# Patient Record
Sex: FEMALE | Race: WHITE | Employment: FULL TIME | URBAN - METROPOLITAN AREA
[De-identification: names, ages, dates, MRNs, and addresses within clinical notes are randomized per-mention and may not be internally consistent; named-entity substitution may affect disease eponyms.]

---

## 2017-06-15 ENCOUNTER — TELEPHONE (OUTPATIENT)
Dept: INTERNAL MEDICINE CLINIC | Age: 47
End: 2017-06-15

## 2017-06-15 NOTE — TELEPHONE ENCOUNTER
Patient request a return call regarding results from her OB GYN. Patient has been scheduled to be seen by  Urology for blood in her urin  and they will can't  see her until next week and the patient is in pain and would like assistance from our office to get in ASAP.  Patient best contact [phone] (M)

## 2017-06-16 NOTE — TELEPHONE ENCOUNTER
R/C to Pt, she states she will wait until appt with urology on Tuesday, 6/20/17. Pt does not need assistance at this time.

## 2018-03-12 ENCOUNTER — HOSPITAL ENCOUNTER (OUTPATIENT)
Dept: MAMMOGRAPHY | Age: 48
Discharge: HOME | End: 2018-03-12
Attending: OBSTETRICS & GYNECOLOGY
Payer: COMMERCIAL

## 2018-03-12 DIAGNOSIS — Z12.31 VISIT FOR SCREENING MAMMOGRAM: ICD-10-CM

## 2018-03-12 PROCEDURE — 77063 BREAST TOMOSYNTHESIS BI: CPT

## 2018-04-09 ENCOUNTER — OFFICE VISIT (OUTPATIENT)
Dept: INTERNAL MEDICINE CLINIC | Age: 48
End: 2018-04-09

## 2018-04-09 VITALS
HEIGHT: 67 IN | SYSTOLIC BLOOD PRESSURE: 124 MMHG | RESPIRATION RATE: 14 BRPM | TEMPERATURE: 97.6 F | WEIGHT: 172.6 LBS | HEART RATE: 89 BPM | OXYGEN SATURATION: 98 % | DIASTOLIC BLOOD PRESSURE: 65 MMHG | BODY MASS INDEX: 27.09 KG/M2

## 2018-04-09 DIAGNOSIS — Z00.00 ROUTINE GENERAL MEDICAL EXAMINATION AT A HEALTH CARE FACILITY: Primary | ICD-10-CM

## 2018-04-09 DIAGNOSIS — F32.0 CURRENT MILD EPISODE OF MAJOR DEPRESSIVE DISORDER WITHOUT PRIOR EPISODE (HCC): ICD-10-CM

## 2018-04-09 RX ORDER — LISDEXAMFETAMINE DIMESYLATE 30 MG/1
CAPSULE ORAL
COMMUNITY
Start: 2018-03-12 | End: 2022-07-12 | Stop reason: SDUPTHER

## 2018-04-09 RX ORDER — BUPROPION HYDROCHLORIDE 150 MG/1
150 TABLET ORAL
COMMUNITY
Start: 2018-03-08

## 2018-04-09 NOTE — PROGRESS NOTES
HISTORY OF PRESENT ILLNESS  Henna Moran is a 53 y.o. female. HPI   Dysthymia- on wellbutrin and vyvanse and doing ok- she went off of it in October and tried to get off of everything and went back in Feb and realized she needed to be back on it ; def helping with mood and focus ; running two times a week ; it has been sporadic - in Jan she did ketogenic diet - did four weeks to the book- no change at all     Review of Systems   Constitutional: Negative for chills, diaphoresis, fever, malaise/fatigue and weight loss. HENT: Negative for congestion, ear pain, hearing loss, sore throat and tinnitus. Eyes: Negative for blurred vision and double vision. Respiratory: Negative for cough, hemoptysis, sputum production, shortness of breath and wheezing. Cardiovascular: Negative for chest pain, palpitations, orthopnea, claudication, leg swelling and PND. Gastrointestinal: Negative for abdominal pain, blood in stool, constipation, diarrhea, heartburn, nausea and vomiting. Genitourinary: Negative for dysuria, flank pain, frequency, hematuria and urgency. Musculoskeletal: Negative for back pain, joint pain, myalgias and neck pain. Skin: Negative. Neurological: Negative for dizziness, tingling, sensory change, speech change, focal weakness, seizures, loss of consciousness, weakness and headaches. Endo/Heme/Allergies: Negative for environmental allergies and polydipsia. Does not bruise/bleed easily. Psychiatric/Behavioral: Negative for depression, memory loss, substance abuse and suicidal ideas. The patient is not nervous/anxious and does not have insomnia. Physical Exam   Constitutional: She is oriented to person, place, and time. She appears well-developed and well-nourished. HENT:   Head: Normocephalic and atraumatic.    Right Ear: External ear normal.   Left Ear: External ear normal.   Nose: Nose normal.   Mouth/Throat: Oropharynx is clear and moist.   Eyes: Conjunctivae and EOM are normal. Pupils are equal, round, and reactive to light. Neck: Normal range of motion. Neck supple. Carotid bruit is not present. No thyromegaly present. Cardiovascular: Normal rate, regular rhythm, S1 normal, S2 normal, normal heart sounds and intact distal pulses. Pulmonary/Chest: Effort normal and breath sounds normal.   Abdominal: Soft. Normal appearance and bowel sounds are normal. There is no hepatosplenomegaly. There is no tenderness. Musculoskeletal: Normal range of motion. Neurological: She is alert and oriented to person, place, and time. Skin: Skin is warm, dry and intact. Psychiatric: She has a normal mood and affect. Her behavior is normal. Judgment and thought content normal.   Nursing note and vitals reviewed. ASSESSMENT and PLAN  Diagnoses and all orders for this visit:    1. Routine general medical examination at a health care facility  -     CBC W/O DIFF  -     METABOLIC PANEL, COMPREHENSIVE  -     LIPID PANEL  -     TSH 3RD GENERATION    2.  Current mild episode of major depressive disorder without prior episode ([facility] [address]- cont with current medication with Elisabeth Crowley       lab results and schedule of future lab studies reviewed with patient  reviewed diet, exercise and weight control  cardiovascular risk and specific lipid/LDL goals reviewed  reviewed medications and side effects in detail

## 2018-04-10 LAB
ALBUMIN SERPL-MCNC: 4.8 G/DL (ref 3.5–5.5)
ALBUMIN/GLOB SERPL: 2 {RATIO} (ref 1.2–2.2)
ALP SERPL-CCNC: 77 IU/L (ref 39–117)
ALT SERPL-CCNC: 14 IU/L (ref 0–32)
AST SERPL-CCNC: 22 IU/L (ref 0–40)
BILIRUB SERPL-MCNC: 0.6 MG/DL (ref 0–1.2)
BUN SERPL-MCNC: 9 MG/DL (ref 6–24)
BUN/CREAT SERPL: 13 (ref 9–23)
CALCIUM SERPL-MCNC: 9.5 MG/DL (ref 8.7–10.2)
CHLORIDE SERPL-SCNC: 97 MMOL/L (ref 96–106)
CHOLEST SERPL-MCNC: 175 MG/DL (ref 100–199)
CO2 SERPL-SCNC: 25 MMOL/L (ref 18–29)
CREAT SERPL-MCNC: 0.71 MG/DL (ref 0.57–1)
ERYTHROCYTE [DISTWIDTH] IN BLOOD BY AUTOMATED COUNT: 13.2 % (ref 12.3–15.4)
GFR SERPLBLD CREATININE-BSD FMLA CKD-EPI: 102 ML/MIN/1.73
GFR SERPLBLD CREATININE-BSD FMLA CKD-EPI: 117 ML/MIN/1.73
GLOBULIN SER CALC-MCNC: 2.4 G/DL (ref 1.5–4.5)
GLUCOSE SERPL-MCNC: 96 MG/DL (ref 65–99)
HCT VFR BLD AUTO: 42.2 % (ref 34–46.6)
HDLC SERPL-MCNC: 79 MG/DL
HGB BLD-MCNC: 14.5 G/DL (ref 11.1–15.9)
INTERPRETATION, 910389: NORMAL
LDLC SERPL CALC-MCNC: 85 MG/DL (ref 0–99)
MCH RBC QN AUTO: 32.2 PG (ref 26.6–33)
MCHC RBC AUTO-ENTMCNC: 34.4 G/DL (ref 31.5–35.7)
MCV RBC AUTO: 94 FL (ref 79–97)
PLATELET # BLD AUTO: 309 X10E3/UL (ref 150–379)
POTASSIUM SERPL-SCNC: 4.2 MMOL/L (ref 3.5–5.2)
PROT SERPL-MCNC: 7.2 G/DL (ref 6–8.5)
RBC # BLD AUTO: 4.5 X10E6/UL (ref 3.77–5.28)
SODIUM SERPL-SCNC: 139 MMOL/L (ref 134–144)
TRIGL SERPL-MCNC: 55 MG/DL (ref 0–149)
TSH SERPL DL<=0.005 MIU/L-ACNC: 2.71 UIU/ML (ref 0.45–4.5)
VLDLC SERPL CALC-MCNC: 11 MG/DL (ref 5–40)
WBC # BLD AUTO: 8.2 X10E3/UL (ref 3.4–10.8)

## 2018-07-11 ENCOUNTER — TELEPHONE (OUTPATIENT)
Dept: INTERNAL MEDICINE CLINIC | Age: 48
End: 2018-07-11

## 2019-08-08 ENCOUNTER — HOSPITAL ENCOUNTER (OUTPATIENT)
Dept: MAMMOGRAPHY | Age: 49
Discharge: HOME | End: 2019-08-08
Attending: OBSTETRICS & GYNECOLOGY
Payer: COMMERCIAL

## 2019-08-08 DIAGNOSIS — Z12.31 VISIT FOR SCREENING MAMMOGRAM: ICD-10-CM

## 2019-08-08 PROCEDURE — 77063 BREAST TOMOSYNTHESIS BI: CPT

## 2019-09-25 ENCOUNTER — OFFICE VISIT (OUTPATIENT)
Dept: INTERNAL MEDICINE CLINIC | Age: 49
End: 2019-09-25

## 2019-09-25 VITALS
RESPIRATION RATE: 16 BRPM | TEMPERATURE: 98.5 F | WEIGHT: 151.4 LBS | OXYGEN SATURATION: 97 % | SYSTOLIC BLOOD PRESSURE: 126 MMHG | HEIGHT: 67 IN | DIASTOLIC BLOOD PRESSURE: 77 MMHG | HEART RATE: 85 BPM | BODY MASS INDEX: 23.76 KG/M2

## 2019-09-25 DIAGNOSIS — F32.0 CURRENT MILD EPISODE OF MAJOR DEPRESSIVE DISORDER WITHOUT PRIOR EPISODE (HCC): ICD-10-CM

## 2019-09-25 DIAGNOSIS — R10.9 ABDOMINAL CRAMPING: ICD-10-CM

## 2019-09-25 DIAGNOSIS — M54.50 ACUTE RIGHT-SIDED LOW BACK PAIN WITHOUT SCIATICA: ICD-10-CM

## 2019-09-25 DIAGNOSIS — R10.2 PELVIC PAIN: Primary | ICD-10-CM

## 2019-09-25 DIAGNOSIS — R53.83 OTHER FATIGUE: ICD-10-CM

## 2019-09-25 LAB
BILIRUB UR QL STRIP: NEGATIVE
GLUCOSE UR-MCNC: NEGATIVE MG/DL
KETONES P FAST UR STRIP-MCNC: NEGATIVE MG/DL
PH UR STRIP: 7 [PH] (ref 4.6–8)
PROT UR QL STRIP: NEGATIVE
SP GR UR STRIP: 1.01 (ref 1–1.03)
UA UROBILINOGEN AMB POC: NORMAL (ref 0.2–1)
URINALYSIS CLARITY POC: NORMAL
URINALYSIS COLOR POC: NORMAL
URINE BLOOD POC: NORMAL
URINE LEUKOCYTES POC: NEGATIVE
URINE NITRITES POC: NEGATIVE

## 2019-09-25 NOTE — PROGRESS NOTES
HISTORY OF PRESENT ILLNESS  Ciarra Abel is a 51 y.o. female. HPI  Flank pain/Fatigue: Pt reports that one of her lymph nodes started to swell in her R groin. She has been experiencing a [place] fog\" for the last few weeks, dizziness, fatigue, minor memory loss, nausea, mild lower abdominal pain, intermittent mild flank pain, intermittent diarrhea, and mild dysuria. Denies strong loss of appetite, bloating/fullness, vaginal discharge, dysuria, polyuria, or severe flank pain. Allergies: Pt reports increased drainage at night. She works at home. Denies consistent cough. Depression: Stable and well-managed with Wellbutrin. Pt endorses stable exercise. Denies SOB or CP. Review of Systems   Constitutional: Positive for malaise/fatigue. Gastrointestinal: Positive for diarrhea and nausea. Negative for blood in stool and constipation. Genitourinary: Positive for dysuria and flank pain. Negative for urgency. Neurological: Positive for dizziness. Psychiatric/Behavioral: Positive for memory loss. All other systems reviewed and are negative. Physical Exam   Constitutional: She is oriented to person, place, and time. She appears well-developed and well-nourished. HENT:   Head: Normocephalic and atraumatic. Right Ear: External ear normal.   Left Ear: External ear normal.   Nose: Nose normal.   Mouth/Throat: Oropharynx is clear and moist.   Eyes: Pupils are equal, round, and reactive to light. Conjunctivae and EOM are normal.   Neck: Normal range of motion. Neck supple. Cardiovascular: Normal rate, regular rhythm, normal heart sounds and intact distal pulses. Pulmonary/Chest: Effort normal and breath sounds normal. Right breast exhibits no inverted nipple, no mass, no nipple discharge, no skin change and no tenderness. Left breast exhibits no inverted nipple, no mass, no nipple discharge, no skin change and no tenderness. No breast swelling, tenderness, discharge or bleeding.  Breasts are symmetrical.   Abdominal: Soft. Bowel sounds are normal.   Genitourinary: Rectum normal and vagina normal. Rectal exam shows anal tone normal and guaiac negative stool. No breast swelling, tenderness, discharge or bleeding. Musculoskeletal: Normal range of motion. Lymphadenopathy:   1 slightly swollen lymph node in R groin   Neurological: She is alert and oriented to person, place, and time. Skin: Skin is warm and dry. Psychiatric: She has a normal mood and affect. Her behavior is normal. Judgment and thought content normal.   Nursing note and vitals reviewed. ASSESSMENT and PLAN  Diagnoses and all orders for this visit:    1. Pelvic pain  Ordered abdominal US-she will get pelvic with GYN. Informed pt that her urine was not immediately indicative of UTI. Informed pt that her urine showed increased blood, which is indicative of kidney stone. Discussed with pt that hormonal shift can cause fatigue, nausea, and trouble focusing. Informed pt that since her sx started when she started taking her generic Wellbutrin (1 mo ago), her sx maybe side effects. Advised pt to f/u with her GYN.   -     AMB POC URINALYSIS DIP STICK AUTO W/O MICRO  -     CULTURE, URINE  -     US ABD COMP; Future    2. Other fatigue  Ordered labs to r/o underlying issues or concerns. Informed pt that her urine was not immediately indicative of UTI. Informed pt that her urine showed increased blood, which is indicative of kidney stone. Discussed with pt that hormonal shift can cause fatigue, nausea, and trouble focusing. Informed pt that since her sx started when she started taking her generic Wellbutrin (1 mo ago), her sx maybe side effects. Advised pt to f/u with her GYN.   -     CBC W/O DIFF  -     METABOLIC PANEL, COMPREHENSIVE  -     LIPID PANEL  -     TSH 3RD GENERATION  -     T4, FREE    3. Current mild episode of major depressive disorder without prior episode ([facility] [address]  Stable and well-managed with Wellbutrin XL.  No change in medications. 4. Acute right-sided low back pain without sciatica  Ordered pelvic US. Informed pt that her urine was not immediately indicative of UTI. Informed pt that her urine showed increased blood, which is indicative of kidney stone. Discussed with pt that hormonal shift can cause fatigue, nausea, and trouble focusing. Informed pt that since her sx started when she started taking her generic Wellbutrin (1 mo ago), her sx maybe side effects. Advised pt to f/u with her GYN.   -     US ABD COMP; Future    5. Abdominal cramping  Ordered labs to r/o underlying issues or conditions. Informed pt that her urine was not immediately indicative of UTI. Informed pt that her urine showed increased blood, which is indicative of kidney stone. Discussed with pt that hormonal shift can cause fatigue, nausea, and trouble focusing. Informed pt that since her sx started when she started taking her generic Wellbutrin (1 mo ago), her sx maybe side effects. Advised pt to f/u with her GYN.   -     AMYLASE  -     LIPASE      Lab results and schedule of future lab studies reviewed with patient. Reviewed diet, exercise and weight control. Written by Jennifer Canela, as dictated by Clem Alfonso MD.     Current diagnosis and concerns discussed with pt at length. Understands risks and benefits or current treatment plan and medications and accepts the treatment and medication with any possible risks. Pt asks appropriate questions which were answered. Pt instructed to call with any concerns or problems. This note will not be viewable in [address]

## 2019-09-26 LAB
ALBUMIN SERPL-MCNC: 5.1 G/DL (ref 3.5–5.5)
ALBUMIN/GLOB SERPL: 2.6 {RATIO} (ref 1.2–2.2)
ALP SERPL-CCNC: 60 IU/L (ref 39–117)
ALT SERPL-CCNC: 13 IU/L (ref 0–32)
AMYLASE SERPL-CCNC: 59 U/L (ref 31–124)
AST SERPL-CCNC: 18 IU/L (ref 0–40)
BILIRUB SERPL-MCNC: 0.7 MG/DL (ref 0–1.2)
BUN SERPL-MCNC: 13 MG/DL (ref 6–24)
BUN/CREAT SERPL: 16 (ref 9–23)
CALCIUM SERPL-MCNC: 10 MG/DL (ref 8.7–10.2)
CHLORIDE SERPL-SCNC: 99 MMOL/L (ref 96–106)
CHOLEST SERPL-MCNC: 186 MG/DL (ref 100–199)
CO2 SERPL-SCNC: 24 MMOL/L (ref 20–29)
CREAT SERPL-MCNC: 0.79 MG/DL (ref 0.57–1)
ERYTHROCYTE [DISTWIDTH] IN BLOOD BY AUTOMATED COUNT: 12.7 % (ref 12.3–15.4)
GLOBULIN SER CALC-MCNC: 2 G/DL (ref 1.5–4.5)
GLUCOSE SERPL-MCNC: 104 MG/DL (ref 65–99)
HCT VFR BLD AUTO: 43.6 % (ref 34–46.6)
HDLC SERPL-MCNC: 86 MG/DL
HGB BLD-MCNC: 14.8 G/DL (ref 11.1–15.9)
INTERPRETATION, 910389: NORMAL
LDLC SERPL CALC-MCNC: 93 MG/DL (ref 0–99)
LIPASE SERPL-CCNC: 31 U/L (ref 14–72)
MCH RBC QN AUTO: 32.4 PG (ref 26.6–33)
MCHC RBC AUTO-ENTMCNC: 33.9 G/DL (ref 31.5–35.7)
MCV RBC AUTO: 95 FL (ref 79–97)
PLATELET # BLD AUTO: 342 X10E3/UL (ref 150–450)
POTASSIUM SERPL-SCNC: 4.2 MMOL/L (ref 3.5–5.2)
PROT SERPL-MCNC: 7.1 G/DL (ref 6–8.5)
RBC # BLD AUTO: 4.57 X10E6/UL (ref 3.77–5.28)
SODIUM SERPL-SCNC: 136 MMOL/L (ref 134–144)
T4 FREE SERPL-MCNC: 1.15 NG/DL (ref 0.82–1.77)
TRIGL SERPL-MCNC: 33 MG/DL (ref 0–149)
TSH SERPL DL<=0.005 MIU/L-ACNC: 1.83 UIU/ML (ref 0.45–4.5)
VLDLC SERPL CALC-MCNC: 7 MG/DL (ref 5–40)
WBC # BLD AUTO: 7.5 X10E3/UL (ref 3.4–10.8)

## 2019-09-27 LAB — BACTERIA UR CULT: NORMAL

## 2020-01-15 ENCOUNTER — TELEPHONE (OUTPATIENT)
Dept: INTERNAL MEDICINE CLINIC | Age: 50
End: 2020-01-15

## 2020-01-15 NOTE — TELEPHONE ENCOUNTER
Patient is requesting to speak with the nurse, states Dr Petros Garcia 's office was going to forward her biopsy reports , states they want her to have surgery Monday but wants to check with PCP on the matter , she can be reached at [phone]

## 2020-01-16 NOTE — TELEPHONE ENCOUNTER
Spoke with pt and advised her that we have not received results. Provided her with the nurses' station fax number and advised her to have 's office fax the results again. Pt understood and was thankful for the call.

## 2020-01-17 NOTE — TELEPHONE ENCOUNTER
Records received from Dr. Leonarda Scruggs and given to Dr. Karlene Lombardi for review. She will contact the patient to discuss.

## 2020-01-21 ENCOUNTER — TELEPHONE (OUTPATIENT)
Dept: INTERNAL MEDICINE CLINIC | Age: 50
End: 2020-01-21

## 2020-01-21 NOTE — TELEPHONE ENCOUNTER
Could you please call Dr. Andie Jain's office and make sure I get recent notes from removal of lymph node and path report done yesterday?

## 2020-01-21 NOTE — TELEPHONE ENCOUNTER
Do either of you have access to [facility]- she had lymph node removal yesterday and we are suspicious that it is lymphoma so looking for path report?  It was done at [facility]

## 2020-01-23 DIAGNOSIS — C83.30 DIFFUSE LARGE B-CELL LYMPHOMA, UNSPECIFIED BODY REGION (HCC): Primary | ICD-10-CM

## 2020-01-24 ENCOUNTER — OFFICE VISIT (OUTPATIENT)
Dept: INTERNAL MEDICINE CLINIC | Age: 50
End: 2020-01-24

## 2020-01-24 ENCOUNTER — HOSPITAL ENCOUNTER (OUTPATIENT)
Dept: LAB | Age: 50
Discharge: HOME | End: 2020-01-24

## 2020-01-24 ENCOUNTER — HOSPITAL ENCOUNTER (OUTPATIENT)
Dept: CT IMAGING | Age: 50
Discharge: HOME | End: 2020-01-24
Payer: COMMERCIAL

## 2020-01-24 VITALS
RESPIRATION RATE: 16 BRPM | HEIGHT: 67 IN | DIASTOLIC BLOOD PRESSURE: 71 MMHG | TEMPERATURE: 97.8 F | SYSTOLIC BLOOD PRESSURE: 109 MMHG | HEART RATE: 77 BPM | OXYGEN SATURATION: 98 % | WEIGHT: 154.6 LBS | BODY MASS INDEX: 24.27 KG/M2

## 2020-01-24 DIAGNOSIS — C83.30 DIFFUSE LARGE B-CELL LYMPHOMA, UNSPECIFIED BODY REGION (HCC): ICD-10-CM

## 2020-01-24 DIAGNOSIS — C85.90 NON-HODGKIN'S LYMPHOMA, UNSPECIFIED BODY REGION, UNSPECIFIED NON-HODGKIN LYMPHOMA TYPE (HCC): ICD-10-CM

## 2020-01-24 DIAGNOSIS — R59.1 LYMPHADENOPATHY: Primary | ICD-10-CM

## 2020-01-24 DIAGNOSIS — C83.30 RETICULOSARCOMA (HCC): ICD-10-CM

## 2020-01-24 DIAGNOSIS — R59.1 LYMPHADENOPATHY: ICD-10-CM

## 2020-01-24 LAB
ALBUMIN SERPL-MCNC: 4.2 G/DL (ref 3.5–5)
ALBUMIN/GLOB SERPL: 1.5 {RATIO} (ref 1.1–2.2)
ALP SERPL-CCNC: 57 U/L (ref 45–117)
ALT SERPL-CCNC: 19 U/L (ref 12–78)
ANION GAP SERPL CALC-SCNC: 2 MMOL/L (ref 5–15)
AST SERPL-CCNC: 15 U/L (ref 15–37)
BASOPHILS # BLD: 0.1 K/UL (ref 0–0.1)
BASOPHILS NFR BLD: 1 % (ref 0–1)
BILIRUB SERPL-MCNC: 0.3 MG/DL (ref 0.2–1)
BUN SERPL-MCNC: 14 MG/DL (ref 6–20)
BUN/CREAT SERPL: 19 (ref 12–20)
CALCIUM SERPL-MCNC: 9 MG/DL (ref 8.5–10.1)
CHLORIDE SERPL-SCNC: 106 MMOL/L (ref 97–108)
CO2 SERPL-SCNC: 28 MMOL/L (ref 21–32)
CREAT SERPL-MCNC: 0.75 MG/DL (ref 0.55–1.02)
DIFFERENTIAL METHOD BLD: ABNORMAL
EOSINOPHIL # BLD: 0.2 K/UL (ref 0–0.4)
EOSINOPHIL NFR BLD: 4 % (ref 0–7)
ERYTHROCYTE [DISTWIDTH] IN BLOOD BY AUTOMATED COUNT: 11.7 % (ref 11.5–14.5)
GLOBULIN SER CALC-MCNC: 2.8 G/DL (ref 2–4)
GLUCOSE SERPL-MCNC: 94 MG/DL (ref 65–100)
HBV SURFACE AB SER QL: NONREACTIVE
HBV SURFACE AB SER-ACNC: <3.1 MIU/ML
HCT VFR BLD AUTO: 42.4 % (ref 35–47)
HGB BLD-MCNC: 13.9 G/DL (ref 11.5–16)
IMM GRANULOCYTES # BLD AUTO: 0 K/UL (ref 0–0.04)
IMM GRANULOCYTES NFR BLD AUTO: 1 % (ref 0–0.5)
LDH SERPL L TO P-CCNC: 138 U/L (ref 81–246)
LYMPHOCYTES # BLD: 1.9 K/UL (ref 0.8–3.5)
LYMPHOCYTES NFR BLD: 31 % (ref 12–49)
MCH RBC QN AUTO: 32.1 PG (ref 26–34)
MCHC RBC AUTO-ENTMCNC: 32.8 G/DL (ref 30–36.5)
MCV RBC AUTO: 97.9 FL (ref 80–99)
MONOCYTES # BLD: 0.6 K/UL (ref 0–1)
MONOCYTES NFR BLD: 10 % (ref 5–13)
NEUTS SEG # BLD: 3.2 K/UL (ref 1.8–8)
NEUTS SEG NFR BLD: 53 % (ref 32–75)
NRBC # BLD: 0 K/UL (ref 0–0.01)
NRBC BLD-RTO: 0 PER 100 WBC
PLATELET # BLD AUTO: 295 K/UL (ref 150–400)
PMV BLD AUTO: 9.8 FL (ref 8.9–12.9)
POTASSIUM SERPL-SCNC: 4.3 MMOL/L (ref 3.5–5.1)
PROT SERPL-MCNC: 7 G/DL (ref 6.4–8.2)
RBC # BLD AUTO: 4.33 M/UL (ref 3.8–5.2)
SODIUM SERPL-SCNC: 136 MMOL/L (ref 136–145)
WBC # BLD AUTO: 6 K/UL (ref 3.6–11)

## 2020-01-24 PROCEDURE — 74177 CT ABD & PELVIS W/CONTRAST: CPT

## 2020-01-24 PROCEDURE — 71260 CT THORAX DX C+: CPT

## 2020-01-24 RX ORDER — BARIUM SULFATE 20 MG/ML
900 SUSPENSION ORAL
Status: COMPLETED | OUTPATIENT
Start: 2020-01-24 | End: 2020-01-24

## 2020-01-24 RX ORDER — FLUOXETINE HYDROCHLORIDE 40 MG/1
CAPSULE ORAL
COMMUNITY
Start: 2020-01-07 | End: 2022-06-30 | Stop reason: ALTCHOICE

## 2020-01-24 RX ORDER — SODIUM CHLORIDE 0.9 % (FLUSH) 0.9 %
10 SYRINGE (ML) INJECTION
Status: COMPLETED | OUTPATIENT
Start: 2020-01-24 | End: 2020-01-24

## 2020-01-24 RX ORDER — DEXTROAMPHETAMINE SULFATE, DEXTROAMPHETAMINE SACCHARATE, AMPHETAMINE SULFATE AND AMPHETAMINE ASPARTATE 7.5; 7.5; 7.5; 7.5 MG/1; MG/1; MG/1; MG/1
CAPSULE, EXTENDED RELEASE ORAL
COMMUNITY
Start: 2020-01-07 | End: 2020-08-17 | Stop reason: ALTCHOICE

## 2020-01-24 RX ADMIN — Medication 10 ML: at 13:54

## 2020-01-24 RX ADMIN — BARIUM SULFATE 900 ML: 20 SUSPENSION ORAL at 13:54

## 2020-01-24 NOTE — PROGRESS NOTES
HISTORY OF PRESENT ILLNESS  Caryn Chamorro is a 51 y.o. female. HPI   Lymphadenopathy: Pt states her sx have drastically worsened over the last 2 weeks. She has been experiencing worsening radiating pain in her abdomen for approx 1-2 weeks. The pain is accompanied with nausea, dizziness and radiating pain to R flank. She is getting increasingly SOB while walking up stairs or up hills. She recently was not able to walk up a hill in her neighborhood due to her severe SOB. Confirms diaphoresis, but she does not experience night sweats. She also has a tingling pain radiating down to her toes. Confirms drastic loss of appetite over the last week. She has a lymph node removal in her R groin on Monday, which at first glance was suspicious of B cell lymphoma. Review of Systems   Constitutional: Positive for malaise/fatigue and weight loss. Respiratory: Positive for shortness of breath. Gastrointestinal: Positive for abdominal pain. Neurological: Positive for tingling. All other systems reviewed and are negative. Physical Exam  Constitutional:       Appearance: Normal appearance. HENT:      Right Ear: Hearing, tympanic membrane and external ear normal.      Left Ear: Hearing, tympanic membrane and external ear normal.      Mouth/Throat:      Mouth: Mucous membranes are moist.      Pharynx: Oropharynx is clear. Cardiovascular:      Rate and Rhythm: Normal rate and regular rhythm. Pulmonary:      Effort: Pulmonary effort is normal.      Breath sounds: Normal breath sounds and air entry. Abdominal:      General: Bowel sounds are normal.      Tenderness: There is tenderness (Diffused). Musculoskeletal: Normal range of motion. Lymphadenopathy:      Lower Body: Right inguinal adenopathy present. Left inguinal adenopathy present. Comments: Inguinal lymphadenopathy (R>L)    Skin:     General: Skin is warm and dry. Neurological:      General: No focal deficit present.       Mental Status: She is alert and oriented to person, place, and time. Psychiatric:         Mood and Affect: Mood normal.         Behavior: Behavior normal.         ASSESSMENT and PLAN  Diagnoses and all orders for this visit:    1. Lymphadenopathy  Ordered STAT labs and CT of chest and abdomen due to worsening of sx. Worried about lymphadenopathy compressing on organs or nerves causing worsening of sx. Getting STAT labs and scans. May need urgent referral once we have results of scans and labs. Waiting on results. -     CBC WITH AUTOMATED DIFF; Future  -     METABOLIC PANEL, COMPREHENSIVE; Future  -     LD; Future  -     CT CHEST ABD PELV W WO CONT; Future    2. Non-Hodgkin's lymphoma, unspecified body region, unspecified non-Hodgkin lymphoma type ([facility] [address]  Ordered STAT labs and CT of chest and abdomen due to worsening of sx. Worried about lymphadenopathy compressing on organs or nerves causing worsening of sx. Getting STAT labs and scans. May need urgent referral once we have results of scans and labs. Waiting on results. -     CBC WITH AUTOMATED DIFF; Future  -     METABOLIC PANEL, COMPREHENSIVE; Future  -     LD; Future  -     HEP B SURFACE AB; Future  -     CT CHEST ABD PELV W WO CONT; Future    Lab results and schedule of future lab studies reviewed with patient. Reviewed diet, exercise and weight control. Written by Kevin Palomares, as dictated by Isreal Grewal MD.     Current diagnosis and concerns discussed with pt at length. Understands risks and benefits or current treatment plan and medications and accepts the treatment and medication with any possible risks. Pt asks appropriate questions which were answered. Pt instructed to call with any concerns or problems. This note will not be viewable in [address]

## 2020-01-25 NOTE — PROGRESS NOTES
You are seeing her as a new patient on Tuesday at 3:00 - new diagnosis for follicular lymphoma. I have her Path report from [address] [place] [address] that we will fax to you on Monday.  I also ordered a CT scan which is in system

## 2020-01-25 NOTE — PROGRESS NOTES
CT scans for the patient that I mentioned in the labs I sent you- seeing her on Tuesday and I will fax over path reports

## 2020-01-27 NOTE — PROGRESS NOTES
[id number] [facility] Oncology [facility]  [phone]    Hematology / Oncology Consult    Reason for Visit:   Fredrick Zuniga is a 51 y.o. female who is seen in consultation at the request of Dr. Axel Brown for evaluation of follicular lymphoma. Hematology Oncology Treatment History:     Diagnosis: Follicular lymphoma    Stage: Pending; FLIPI2 pending    Pathology:   1/20/20 Right inguinal LN excision: Follicular lymphoma, grade 1-2, follicular pattern, IQ93 positive. Comment:   The LN specimen demonstrates increased follicles, lacking normal germinal centers. IHC markers are performed with good controls. Tumor cells are positive for CD20 and BCL2. CD3 marks background T cells. Cyclin D1 is negative in the lymphoid population. CD21 highlights intact dendritic meshworks. Flow cytometry shows positive expression of CD10 and kappa LC restriction. Ki-67 is pending. Focally there are a few follicles with greater than fifteen centroblasts per HPF; however the majority of follicles have less than 15 centroblasts/HPF, consistent with grade 1-2. Diffuse areas are not seen. Prior Treatment: None    Current Treatment: pending  Treatment duration    Frequency of visits     History of Present Illness:   Fredrick Zuniga is a 51 y.o. female who comes in for evaluation of Follicular lymphoma. She states she has had vague symptoms for several years as follows. Pt reports h/o pruritus which started 6 yrs ago, which finally subsided with Zyrtec. She saw a hematologist at that time, who told her symptoms might surface as a blood disorder in the future. In 2016, pt went to see her Gyn (Dr. Randi Romero) for abdominal cramping. Workup normal at time time. In summer of 2019, pt developed fatigue as well as same of the chronic pain in abdominal area. In fall of 2019, she developed memory issues with work.  She went to see her Psychiatrist due to these symptoms and switched her from generic Wellbutrin to brand name Wellbutrin. No improvement after 3-4 weeks. She was okay with sleeping during that time, but continued to have severe fatigue out of character for her. She went to see her PCP and described a right inguinal LN enlargement. She went to see a surgeon, Dr. Eneida Dutta. Ultrasound of that region did demonstrate an enlarged LN. FNA was inconclusive. Core needle biopsy was suspicious for B cell lymphoma. Excisional LN biopsy 9/01/49 revealed follicular lymphoma. CT of chest/abd/pelvis was notable for retroperitoneal, pelvic and portacaval LAD. Pt also reports left hip pain, left foot tingling. No fevers, chills, weight loss. She does have occasional sweats more notable a few months ago, but not drenching or nightly. Past Medical History:   Diagnosis Date    Depression 2/10/2009    Fibrocystic breast 2/10/2009      Past Surgical History:   Procedure Laterality Date    HX BREAST BIOPSY Bilateral 20 + yrs ago    neg    HX HEENT      wisdom teeth extraction      Social History     Tobacco Use    Smoking status: Never Smoker    Smokeless tobacco: Never Used   Substance Use Topics    Alcohol use: Yes     Alcohol/week: 4.2 standard drinks     Types: 5 Glasses of wine per week     Comment: occassionally      Family History   Problem Relation Age of Onset    Cancer Mother         breast-63    Breast Cancer Mother 61    Stroke Father     Diabetes Neg Hx     Heart Disease Neg Hx     Hypertension Neg Hx      Current Outpatient Medications   Medication Sig    ADDERALL XR 30 mg XR capsule     FLUoxetine (PROZAC) 40 mg capsule     buPROPion XL (WELLBUTRIN XL) 150 mg tablet 150 mg every morning.  VYVANSE 30 mg capsule      No current facility-administered medications for this visit. No Known Allergies     Review of Systems: A complete review of systems was obtained, negative except as described above.     Physical Exam:     Visit Vitals  /70   Pulse 96   Temp 97.9 °F (36.6 °C) (Oral)   Resp 16   Ht 5' 7\" (1.702 m)   Wt 154 lb (69.9 kg)   LMP 01/01/2020   SpO2 97%   BMI 24.12 kg/m²     ECOG PS: 0-1  General: Well developed, no acute distress  Eyes: PERRLA, EOMI, anicteric sclerae  HENT: Atraumatic, OP clear, TMs intact without erythema  Neck: Supple  Lymphatic: No cervical, axillary or inguinal adenopathy. Bilateral supraclavicular fullness. Respiratory: CTAB, normal respiratory effort  CV: Normal rate, regular rhythm, no murmurs, no peripheral edema  GI: Soft, nontender, nondistended, no masses, no hepatomegaly, no splenomegaly  MS: Normal gait and station. Digits without clubbing or cyanosis. Skin: No rashes, ecchymoses, or petechiae. Normal temperature, turgor, and texture. Neuro/Psych: Alert, oriented. 5/5 strength in all 4 extremities. Appropriate affect, normal judgment/insight. Results:     Lab Results   Component Value Date/Time    WBC 6.0 01/24/2020 08:13 AM    HGB 13.9 01/24/2020 08:13 AM    HCT 42.4 01/24/2020 08:13 AM    PLATELET 773 54/00/6293 08:13 AM    MCV 97.9 01/24/2020 08:13 AM    ABS. NEUTROPHILS 3.2 01/24/2020 08:13 AM     Lab Results   Component Value Date/Time    Sodium 136 01/24/2020 08:13 AM    Potassium 4.3 01/24/2020 08:13 AM    Chloride 106 01/24/2020 08:13 AM    CO2 28 01/24/2020 08:13 AM    Glucose 94 01/24/2020 08:13 AM    BUN 14 01/24/2020 08:13 AM    Creatinine 0.75 01/24/2020 08:13 AM    GFR est AA >60 01/24/2020 08:13 AM    GFR est non-AA >60 01/24/2020 08:13 AM    Calcium 9.0 01/24/2020 08:13 AM     Lab Results   Component Value Date/Time    Bilirubin, total 0.3 01/24/2020 08:13 AM    ALT (SGPT) 19 01/24/2020 08:13 AM    AST (SGOT) 15 01/24/2020 08:13 AM    Alk.  phosphatase 57 01/24/2020 08:13 AM    Protein, total 7.0 01/24/2020 08:13 AM    Albumin 4.2 01/24/2020 08:13 AM    Globulin 2.8 01/24/2020 08:13 AM     No results found for: IRON, FE, TIBC, IBCT, PSAT, FERR    No results found for: B12LT, FOL, RBCF  Lab Results   Component Value Date/Time    TSH 1.830 09/25/2019 12:45 PM     Lab Results   Component Value Date/Time    Hepatitis B surface Ab <3.10 01/24/2020 08:13 AM         Imaging:     Chest CT  1/24/20:  FINDINGS:  The normal-sized axillary lymph nodes are unchanged. THYROID: No nodule. MEDIASTINUM: No mass or lymphadenopathy. MARCE: No mass or lymphadenopathy. THORACIC AORTA: No aneurysm. MAIN PULMONARY ARTERY: Normal in caliber. TRACHEA/BRONCHI: Patent. ESOPHAGUS: No wall thickening or dilatation. HEART: Normal in size. PLEURA: No effusion or pneumothorax. LUNGS: Laterally in the left lower lobe on image 53 is a 2 to 3 mm pulmonary  nodule which is unchanged when compared with the examination of 6/27/2017. This  is a benign nodule and not significant. .  There are normal size lymph nodes adjacent to the descending thoracic aorta and  paraspinal region which have increased in size but remain normal in size. .  Abdomen and pelvis: There has been interval development of adenopathy. In the portacaval space there  is a lymph node that measures 17 mm in short axis. There are retroperitoneal  lymph nodes that are enlarged as well. The largest lymph node in the  retroperitoneum is posterior to the inferior vena cava beginning at the level of  the right renal artery this extends inferiorly. This measures 2.4 x 1.0 x 4.0  cm. There are also enlarged lymph nodes in the left aortic region largest  measuring 12 mm in short axis. There are also lymph nodes posterior to the head of the pancreas and anterior to  the inferior vena cava which are enlarged the largest measures 10 mm in short  axis  There are enlarged lymph nodes in the interaortocaval space. Enlarged lymph  nodes along the right common iliac artery largest measuring approximately 12 mm  in short axis. There are enlarged lymph nodes in the right external iliac chain  largest measuring 9 mm. There is an enlarged lymph node along the right pelvic sidewall measuring 2.5 x  1.3 cm.  There are postsurgical changes in the right inguinal region with one  tiny locule of air. There are residual lymph nodes in the right inguinal largest  measuring 10 mm. .  The uterus images normally. There is a 1 cm cyst in the right ovary. There is no free fluid. Review of the bone windows reveals no destructive lesions. The liver and gallbladder are unremarkable. The spleen is normal in size and  configuration. The pancreas and adrenal glands and kidneys are unremarkable. The aorta and inferior vena cava are unremarkable. IMPRESSION:  1. There is is adenopathy as described. There is adenopathy in the right  inguinal region, along the right pelvic sidewall, along the right iliac chain,  bilateral retroperitoneum, and in the portacaval space as well as posterior to  the pancreas head. 2. In the posterior mediastinum adjacent to the descending thoracic aorta are  lymph nodes that measure less than 1 cm in size but these have increased in the  interval.  3. Please see above text    Assessment & Plan:   Ashley Flowers is a 51 y.o. female with Depression comes in for evaluation and management of Follicular lymphoma. 1. Follicular lymphoma: Possibly Stage III if mediastinal LNs involved. Normal LDH and no cytopenias or B symptoms present. Discussed that this is an indolent lymphoma which does not necessarily recommend treatment at time of diagnosis. Patients are observed with H&P and labs every 3-6 months. Treatment is for those who develop B symptoms, cytopenias or bulky disease (LN >7cm). At this time, I am suspicious that patient's symptoms may be related to the disease. Furthermore, although there are no \"bulky\" LNs > 7cm, there is a 4cm LN in the right retroperitoneal LN region - will discuss with Radiology whether this is at risk of compression of renal vasculature. I recommend a bone marrow biopsy procedure and PET scan for further staging and evaluation.   -- B2M  -- Bone marrow biopsy  -- PET/CT now  -- Return in 2.5 weeks for follow up.    2. Depression / ADHD: On Buproprion, Fluoxetine, Vyvanse. 3. Abdominal and back pain: Unclear etiology and may be related to lymphoma. Could also be due to other factors. Emotional well being: Pt is coping well with his/her disease and has excellent support. I appreciate the opportunity to participate in Ms. Nancy Harris mague.     Signed By: Adelina Merino MD     January 27, 2020

## 2020-01-28 ENCOUNTER — OFFICE VISIT (OUTPATIENT)
Dept: ONCOLOGY | Age: 50
End: 2020-01-28

## 2020-01-28 VITALS
BODY MASS INDEX: 24.17 KG/M2 | TEMPERATURE: 97.9 F | SYSTOLIC BLOOD PRESSURE: 112 MMHG | RESPIRATION RATE: 16 BRPM | OXYGEN SATURATION: 97 % | HEIGHT: 67 IN | DIASTOLIC BLOOD PRESSURE: 70 MMHG | HEART RATE: 96 BPM | WEIGHT: 154 LBS

## 2020-01-28 DIAGNOSIS — F90.9 ATTENTION DEFICIT HYPERACTIVITY DISORDER (ADHD), UNSPECIFIED ADHD TYPE: ICD-10-CM

## 2020-01-28 DIAGNOSIS — C82.13 FOLLICULAR LYMPHOMA GRADE II OF INTRA-ABDOMINAL LYMPH NODES (HCC): Primary | ICD-10-CM

## 2020-01-28 DIAGNOSIS — F32.0 CURRENT MILD EPISODE OF MAJOR DEPRESSIVE DISORDER WITHOUT PRIOR EPISODE (HCC): ICD-10-CM

## 2020-01-28 DIAGNOSIS — R10.31 RIGHT LOWER QUADRANT ABDOMINAL PAIN: ICD-10-CM

## 2020-01-28 NOTE — PROGRESS NOTES
Shanda Brittle is a 51 y.o. female here today for follow up of follicular lymphoma. States pain to abdomen, 4/10.

## 2020-01-30 LAB — B2 MICROGLOB SERPL-MCNC: 1 MG/L (ref 0.6–2.4)

## 2020-02-05 ENCOUNTER — HOSPITAL ENCOUNTER (OUTPATIENT)
Dept: CT IMAGING | Age: 50
Discharge: HOME | End: 2020-02-05
Attending: INTERNAL MEDICINE
Payer: COMMERCIAL

## 2020-02-05 VITALS
OXYGEN SATURATION: 97 % | SYSTOLIC BLOOD PRESSURE: 101 MMHG | DIASTOLIC BLOOD PRESSURE: 67 MMHG | RESPIRATION RATE: 13 BRPM | HEIGHT: 67 IN | WEIGHT: 154 LBS | HEART RATE: 62 BPM | BODY MASS INDEX: 24.17 KG/M2

## 2020-02-05 DIAGNOSIS — C82.13 FOLLICULAR LYMPHOMA GRADE II OF INTRA-ABDOMINAL LYMPH NODES (HCC): ICD-10-CM

## 2020-02-05 LAB
BASOPHILS # BLD: 0.1 K/UL (ref 0–0.1)
BASOPHILS NFR BLD: 1 % (ref 0–1)
DIFFERENTIAL METHOD BLD: NORMAL
EOSINOPHIL # BLD: 0.2 K/UL (ref 0–0.4)
EOSINOPHIL NFR BLD: 3 % (ref 0–7)
ERYTHROCYTE [DISTWIDTH] IN BLOOD BY AUTOMATED COUNT: 11.8 % (ref 11.5–14.5)
HCT VFR BLD AUTO: 41.5 % (ref 35–47)
HGB BLD-MCNC: 14 G/DL (ref 11.5–16)
IMM GRANULOCYTES # BLD AUTO: 0 K/UL (ref 0–0.04)
IMM GRANULOCYTES NFR BLD AUTO: 0 % (ref 0–0.5)
LYMPHOCYTES # BLD: 1.5 K/UL (ref 0.8–3.5)
LYMPHOCYTES NFR BLD: 25 % (ref 12–49)
MCH RBC QN AUTO: 32 PG (ref 26–34)
MCHC RBC AUTO-ENTMCNC: 33.7 G/DL (ref 30–36.5)
MCV RBC AUTO: 95 FL (ref 80–99)
MONOCYTES # BLD: 0.5 K/UL (ref 0–1)
MONOCYTES NFR BLD: 9 % (ref 5–13)
NEUTS SEG # BLD: 3.6 K/UL (ref 1.8–8)
NEUTS SEG NFR BLD: 62 % (ref 32–75)
NRBC # BLD: 0 K/UL (ref 0–0.01)
NRBC BLD-RTO: 0 PER 100 WBC
PLATELET # BLD AUTO: 316 K/UL (ref 150–400)
PMV BLD AUTO: 9.3 FL (ref 8.9–12.9)
RBC # BLD AUTO: 4.37 M/UL (ref 3.8–5.2)
WBC # BLD AUTO: 5.8 K/UL (ref 3.6–11)

## 2020-02-05 PROCEDURE — 36415 COLL VENOUS BLD VENIPUNCTURE: CPT

## 2020-02-05 PROCEDURE — 74011250636 HC RX REV CODE- 250/636: Performed by: RADIOLOGY

## 2020-02-05 PROCEDURE — 77030028872 HC BN BIOP NDL ON CNTRL TY TELE -C

## 2020-02-05 PROCEDURE — 88341 IMHCHEM/IMCYTCHM EA ADD ANTB: CPT

## 2020-02-05 PROCEDURE — 99152 MOD SED SAME PHYS/QHP 5/>YRS: CPT

## 2020-02-05 PROCEDURE — 85025 COMPLETE CBC W/AUTO DIFF WBC: CPT

## 2020-02-05 PROCEDURE — 88342 IMHCHEM/IMCYTCHM 1ST ANTB: CPT

## 2020-02-05 PROCEDURE — 88305 TISSUE EXAM BY PATHOLOGIST: CPT

## 2020-02-05 PROCEDURE — 38221 DX BONE MARROW BIOPSIES: CPT

## 2020-02-05 PROCEDURE — 88313 SPECIAL STAINS GROUP 2: CPT

## 2020-02-05 PROCEDURE — 77030014115

## 2020-02-05 PROCEDURE — 88311 DECALCIFY TISSUE: CPT

## 2020-02-05 RX ORDER — MIDAZOLAM HYDROCHLORIDE 1 MG/ML
5 INJECTION, SOLUTION INTRAMUSCULAR; INTRAVENOUS
Status: DISCONTINUED | OUTPATIENT
Start: 2020-02-05 | End: 2020-02-05 | Stop reason: ALTCHOICE

## 2020-02-05 RX ORDER — FENTANYL CITRATE 50 UG/ML
100 INJECTION, SOLUTION INTRAMUSCULAR; INTRAVENOUS
Status: DISCONTINUED | OUTPATIENT
Start: 2020-02-05 | End: 2020-02-05 | Stop reason: ALTCHOICE

## 2020-02-05 RX ADMIN — MIDAZOLAM 1 MG: 1 INJECTION INTRAMUSCULAR; INTRAVENOUS at 11:23

## 2020-02-05 RX ADMIN — FENTANYL CITRATE 25 MCG: 50 INJECTION, SOLUTION INTRAMUSCULAR; INTRAVENOUS at 11:23

## 2020-02-05 RX ADMIN — FENTANYL CITRATE 25 MCG: 50 INJECTION, SOLUTION INTRAMUSCULAR; INTRAVENOUS at 11:15

## 2020-02-05 RX ADMIN — MIDAZOLAM 1 MG: 1 INJECTION INTRAMUSCULAR; INTRAVENOUS at 11:29

## 2020-02-05 RX ADMIN — FENTANYL CITRATE 25 MCG: 50 INJECTION, SOLUTION INTRAMUSCULAR; INTRAVENOUS at 11:31

## 2020-02-05 RX ADMIN — FENTANYL CITRATE 25 MCG: 50 INJECTION, SOLUTION INTRAMUSCULAR; INTRAVENOUS at 11:28

## 2020-02-05 RX ADMIN — MIDAZOLAM 1 MG: 1 INJECTION INTRAMUSCULAR; INTRAVENOUS at 11:15

## 2020-02-05 RX ADMIN — MIDAZOLAM 1 MG: 1 INJECTION INTRAMUSCULAR; INTRAVENOUS at 11:31

## 2020-02-05 NOTE — PROGRESS NOTES
Pt rec'd ambulatory to Rad Holding from patient access area for CT guided bone marrow biopsy. Pt is alert and oriented x 3. Describes intermittent pain of variable intensity deep in her left hip joint, currently 2/10. Lungs CTA, S1S2.  22 GA PIV initiated to L AC and blood to lab. 21  Dr. Kayla Murillo in to discuss procedure,risks and benefits, plan for sedation made and consent obtained. Awaiting CT availability. 1110 To CT    1135 Procedure complete. First med 6474, start time 1130, stop time 1134. Total of Versed 4 mg and Fentanyl 100 mcg IV used for sedation. Pt tolerated well. Gauze and transparent dressing to sacral area biopsy site. To Rad holding via stretcher for monitored recovery. 1230 Discharge instructions reviewed with patient and her spouse and they express understanding. Written copy provided to them. Dressingto sacral biopsy site remains dry and intact. PIV removed and gauze dressing applied. Pt discharged via wheelchair to vehicle to care of her .

## 2020-02-05 NOTE — DISCHARGE INSTRUCTIONS
Patient Education   Patient Education   You had Versed 4 mg and Fentanyl 100 mcg IV for sedation. For questions or concerns please call the Radiology Nurse at [phone]; after 3:30 PM or weekends, please call [phone] and ask for the Radiology Nurse on call to call you back. Sedation for a Medical Procedure: Care Instructions  Your Care Instructions    For a minor procedure or surgery, you will get a sedative to help you relax. This drug will make you sleepy. It is usually given in a vein (by IV). It may be used with anesthesia. There are different types of anesthesia. You and your doctor or anesthesia specialist will work together to choose the best anesthesia for you. It is usually based on your health, the procedure, and your preference. · Local anesthesia is a shot given to numb a small part of the body. · Regional anesthesia is a shot that blocks pain to a larger area of the body. · General anesthesia affects the brain and the whole body. You get it through a small tube placed in a vein (IV). Or you may breathe it in. You are unconscious and will not feel pain. You may get monitored anesthesia care (MAC). This means that an anesthesia specialist will care for you during your surgery. He or she will make sure that you get only the level of anesthesia care you need to prevent pain for your specific case. If you had anesthesia, you may feel some pain and discomfort as it wears off. If you have pain, don't be afraid to say so. Pain medicine works better if you take it before the pain gets bad. Common side effects from sedation include:  · Feeling sleepy. (Your doctors and nurses will make sure you are not too sleepy to go home.)  · Nausea and vomiting. This usually does not last long. · Feeling tired. Follow-up care is a key part of your treatment and safety. Be sure to make and go to all appointments, and call your doctor if you are having problems.  It's also a good idea to know your test results and keep a list of the medicines you take. How can you care for yourself at home? Activity    · Don't do anything for 24 hours that requires attention to detail. This includes going to work, making important decisions, or signing any legal documents. It takes time for the medicine effects to completely wear off.     · For your safety, you should not drive or operate any machinery that could be dangerous until the medicine wears off and you can think clearly and react easily.     · When you get home, it is important to rest until the anesthesia has worn off. Some people will feel drowsy or dizzy for up to a few hours after leaving the hospital.     · Take your time and walk slowly. Sudden changes in position may also cause nausea.     · Rest when you feel tired. Getting enough sleep will help you recover. Diet    · You can eat your normal diet, unless your doctor gives you other instructions. If your stomach is upset, try clear liquids and bland, low-fat foods like plain toast or rice.     · Drink plenty of fluids (unless your doctor tells you not to).   · Don't drink alcohol for 24 hours. Medicines    · Be safe with medicines. Read and follow all instructions on the label. ? If the doctor gave you a prescription medicine for pain, take it as prescribed. ? If you are taking opioids for pain, it is very important to take them as prescribed. Opioids can easily be misused. Misuse can lead to opioid use disorder and even death. Because of this, it is best to get off them as soon as possible. As soon as you don't need them, talk to your doctor about how to safely stop taking them. Also talk with your doctor about how to safely store and get rid of opioids. ? If you are not taking a prescription pain medicine, ask your doctor if you can take an over-the-counter medicine.     · If you think your pain medicine is making you sick to your stomach, you can try these things.   ? Take your medicine after meals (unless your doctor has told you not to). ? Ask your doctor for a different pain medicine. When should you call for help? Call 911 anytime you think you may need emergency care. For example, call if:    · You have severe trouble breathing.     · You passed out (lost consciousness).    Call your doctor now or seek immediate medical care if:    · You have trouble breathing.     · You have ongoing or worsening nausea or vomiting.     · You have a fever.     · You have a new or worse headache.     · The medicine is not wearing off and you can't think clearly.    Watch closely for changes in your health, and be sure to contact your doctor if:    · You do not get better as expected. Where can you learn more? Go to http://madhuri-italo.info/. Enter V849 in the search box to learn more about \"Sedation for a Medical Procedure: Care Instructions. \"  Current as of: December 13, 2018  Content Version: 12.2  © 9712-0798 [employer]. Care instructions adapted under license by [employer] (which disclaims liability or warranty for this information). If you have questions about a medical condition or this instruction, always ask your healthcare professional. [place] [address] any warranty or liability for your use of this information. Bone Marrow Aspiration and Biopsy: What to Expect at Home  Your Recovery  The biopsy site may feel sore for several days. It can help to walk, take pain medicine, and put ice packs on the site. You will probably be able to return to work and your usual activities the day after the procedure. Your doctor or nurse will call you with the results of your test.  This care sheet gives you a general idea about how long it will take for you to recover. But each person recovers at a different pace. Follow the steps below to get better as quickly as possible. How can you care for yourself at home?   Activity    · Rest when you feel tired. Getting enough sleep will help you recover.     · You may drive when you are no longer taking pain pills and can quickly move your foot from the gas pedal to the brake. You must also be able to sit comfortably for a long period of time, even if you do not plan to go far. You might get caught in traffic.     · Most people are able to return to work the day after the procedure. Medicines    · Your doctor will tell you if and when you can restart your medicines. He or she will also give you instructions about taking any new medicines.     · If you take blood thinners, such as warfarin (Coumadin), clopidogrel (Plavix), or aspirin, be sure to talk to your doctor. He or she will tell you if and when to start taking those medicines again. Make sure that you understand exactly what your doctor wants you to do.     · Be safe with medicines. Take pain medicines exactly as directed. ? If the doctor gave you a prescription medicine for pain, take it as prescribed. ? If you are not taking a prescription pain medicine, take an over-the-counter medicine such as acetaminophen (Tylenol), ibuprofen (Advil, Motrin), or naproxen (Aleve). Read and follow all instructions on the label. ? Do not take two or more pain medicines at the same time unless the doctor told you to. Many pain medicines have acetaminophen, which is Tylenol. Too much acetaminophen (Tylenol) can be harmful.     · If you think your pain medicine is making you sick to your stomach:  ? Take your medicine after meals (unless your doctor has told you not to). ? Ask your doctor for a different pain medicine.     · If your doctor prescribed antibiotics, take them as directed. Do not stop taking them just because you feel better.    Ice    · Put ice or a cold pack on the biopsy site for 10 to 20 minutes at a time. Put a thin cloth between the ice and your skin. Follow-up care is a key part of your treatment and safety.  Be sure to make and go to all appointments, and call your doctor if you are having problems. It's also a good idea to know your test results and keep a list of the medicines you take. When should you call for help? Call 911 anytime you think you may need emergency care. For example, call if:    · You passed out (lost consciousness).    Call your doctor now or seek immediate medical care if:    · You have signs of infection, such as:  ? Increased pain, swelling, warmth, or redness. ? Red streaks leading from the biopsy site. ? Pus draining from the biopsy site. ? Swollen lymph nodes in your neck, armpits, or groin. ? A fever.    Watch closely for any changes in your health, and be sure to contact your doctor if:    · You are not getting better as expected. Where can you learn more? Go to http://madhuri-italo.info/. Enter E148 in the search box to learn more about \"Bone Marrow Aspiration and Biopsy: What to Expect at Home. \"  Current as of: March 28, 2019  Content Version: 12.2  © 9403-3325 [employer]. Care instructions adapted under license by [employer] (which disclaims liability or warranty for this information). If you have questions about a medical condition or this instruction, always ask your healthcare professional. [address] any warranty or liability for your use of this information.

## 2020-02-07 ENCOUNTER — TELEPHONE (OUTPATIENT)
Dept: ONCOLOGY | Age: 50
End: 2020-02-07

## 2020-02-07 NOTE — TELEPHONE ENCOUNTER
[address] at [place]  [phone]        02/07/20 11:24 AM Received call from [facility] in authorization department at [facility]. States PET scan, scheduled for 02/10, has been denied and requires [id number]. Provider can call [phone], option 4. Case reference number is [id number]. Will forward to Reshma Bassett to make aware.

## 2020-02-07 NOTE — PROGRESS NOTES
[id number] [facility] at [facility]  [phone]    Hematology / Oncology Established Visit    Reason for Visit:   Nikki Meadows is a 53 y.o. female who comes in for f/u of follicular lymphoma. PCP is Dr. Melissa Lopez. Hematology Oncology Treatment History:     Diagnosis: Follicular lymphoma    Stage: III; FLIPI2 low risk    Pathology:   -1/20/20 Right inguinal LN excision: Follicular lymphoma, grade 1-2, follicular pattern, YG31 positive. Comment:   The LN specimen demonstrates increased follicles, lacking normal germinal centers. IHC markers are performed with good controls. Tumor cells are positive for CD20 and BCL2. CD3 marks background T cells. Cyclin D1 is negative in the lymphoid population. CD21 highlights intact dendritic meshworks. Flow cytometry shows positive expression of CD10 and kappa LC restriction. Ki-67 is pending. Focally there are a few follicles with greater than fifteen centroblasts per HPF; however the majority of follicles have less than 15 centroblasts/HPF, consistent with grade 1-2. Diffuse areas are not seen.    -2/5/20 Bone marrow biopsy: Normocellular marrow with multilineage hematopoiesis and <1% involvement by a CD10 positive B-cell lymphoproliferative disorder   Negative for immunophenotypic or morphologic evidence of dysplasia no increase in blasts. Comment   Flow cytometric analysis reveals a small population of CD10 positive clonal B cells compatible with involvement by the patient's recently diagnosed follicular lymphoma. FISH studies are pending and will be reported. Prior Treatment: None    Current Treatment: Observation    History of Present Illness:   Nikki Meadows is a 53 y.o. female who comes in for evaluation of Follicular lymphoma. She states she has had vague symptoms for several years as follows. Pt reports h/o pruritus which started 6 yrs ago, which finally subsided with Zyrtec.  She saw a hematologist at that time, who told her symptoms might surface as a blood disorder in the future. In , pt went to see her Gyn (Dr. Elio Ambrose) for abdominal cramping. Workup normal at time time. In summer of 2019, pt developed fatigue as well as same of the chronic pain in abdominal area. In , she developed memory issues with work. She went to see her Psychiatrist due to these symptoms and switched her from generic Wellbutrin to brand name Wellbutrin. No improvement after 3-4 weeks. She was okay with sleeping during that time, but continued to have severe fatigue out of character for her. She went to see her PCP and described a right inguinal LN enlargement. She went to see a surgeon, Dr. Cy Palacios. Ultrasound of that region did demonstrate an enlarged LN. FNA was inconclusive. Core needle biopsy was suspicious for B cell lymphoma. Excisional LN biopsy 25 revealed follicular lymphoma. CT of chest/abd/pelvis was notable for retroperitoneal, pelvic and portacaval LAD. Pt also reports left hip pain, left foot tingling. No fevers, chills, weight loss. She does have occasional sweats more notable a few months ago, but not drenching or nightly. Patient comes in for review of bone marrow biopsy and PET scan. She states her abdominal and back pain come and go, but are not debilitating or severe. No diarrhea, excessive gas. Past Medical History:   Diagnosis Date    Chronic pain     Depression 2/10/2009    Fibrocystic breast 2/10/2009      Past Surgical History:   Procedure Laterality Date    HX BREAST BIOPSY Bilateral 20 + yrs ago    neg    HX HEENT      wisdom teeth extraction      Social History     Tobacco Use    Smoking status: Former Smoker     Last attempt to quit: 2000     Years since quittin.0    Smokeless tobacco: Never Used   Substance Use Topics    Alcohol use:  Yes     Alcohol/week: 4.2 standard drinks     Types: 5 Glasses of wine per week     Comment: occassionally      Family History   Problem Relation Age of Onset    Cancer Mother         breast-63    Breast Cancer Mother 61    Stroke Father     Diabetes Neg Hx     Heart Disease Neg Hx     Hypertension Neg Hx      Current Outpatient Medications   Medication Sig    ADDERALL XR 30 mg XR capsule     FLUoxetine (PROZAC) 40 mg capsule     buPROPion XL (WELLBUTRIN XL) 150 mg tablet 150 mg every morning.  VYVANSE 30 mg capsule      No current facility-administered medications for this visit. No Known Allergies     Review of Systems: A complete review of systems was obtained, negative except as described above. Physical Exam:     Visit Vitals  /79 (BP 1 Location: Left arm, BP Patient Position: Sitting)   Pulse 82   Temp 97.5 °F (36.4 °C) (Oral)   Ht 5' 7\" (1.702 m)   Wt 156 lb 9.6 oz (71 kg)   SpO2 98%   BMI 24.53 kg/m²     ECOG PS: 0-1  General: Well developed, no acute distress  Eyes: PERRLA, EOMI, anicteric sclerae  HENT: Atraumatic, OP clear, TMs intact without erythema  Neck: Supple  Lymphatic: No cervical, axillary or inguinal adenopathy. Bilateral supraclavicular fullness. Respiratory: CTAB, normal respiratory effort  CV: Normal rate, regular rhythm, no murmurs, no peripheral edema  GI: Soft, nontender, nondistended, no masses, no hepatomegaly, no splenomegaly  MS: Normal gait and station. Digits without clubbing or cyanosis. Skin: No rashes, ecchymoses, or petechiae. Normal temperature, turgor, and texture. Neuro/Psych: Alert, oriented. 5/5 strength in all 4 extremities. Appropriate affect, normal judgment/insight. Results:     Lab Results   Component Value Date/Time    WBC 5.8 02/05/2020 10:14 AM    HGB 14.0 02/05/2020 10:14 AM    HCT 41.5 02/05/2020 10:14 AM    PLATELET 697 55/69/5614 10:14 AM    MCV 95.0 02/05/2020 10:14 AM    ABS.  NEUTROPHILS 3.6 02/05/2020 10:14 AM     Lab Results   Component Value Date/Time    Sodium 136 01/24/2020 08:13 AM    Potassium 4.3 01/24/2020 08:13 AM    Chloride 106 01/24/2020 08:13 AM    CO2 28 01/24/2020 08:13 AM    Glucose 94 01/24/2020 08:13 AM    BUN 14 01/24/2020 08:13 AM    Creatinine 0.75 01/24/2020 08:13 AM    GFR est AA >60 01/24/2020 08:13 AM    GFR est non-AA >60 01/24/2020 08:13 AM    Calcium 9.0 01/24/2020 08:13 AM     Lab Results   Component Value Date/Time    Bilirubin, total 0.3 01/24/2020 08:13 AM    ALT (SGPT) 19 01/24/2020 08:13 AM    AST (SGOT) 15 01/24/2020 08:13 AM    Alk. phosphatase 57 01/24/2020 08:13 AM    Protein, total 7.0 01/24/2020 08:13 AM    Albumin 4.2 01/24/2020 08:13 AM    Globulin 2.8 01/24/2020 08:13 AM     No results found for: IRON, FE, TIBC, IBCT, PSAT, FERR    No results found for: B12LT, FOL, RBCF  Lab Results   Component Value Date/Time    TSH 1.830 09/25/2019 12:45 PM     Lab Results   Component Value Date/Time    Hepatitis B surface Ab <3.10 01/24/2020 08:13 AM         Imaging:     Chest CT  1/24/20:  FINDINGS:  The normal-sized axillary lymph nodes are unchanged. THYROID: No nodule. MEDIASTINUM: No mass or lymphadenopathy. MARCE: No mass or lymphadenopathy. THORACIC AORTA: No aneurysm. MAIN PULMONARY ARTERY: Normal in caliber. TRACHEA/BRONCHI: Patent. ESOPHAGUS: No wall thickening or dilatation. HEART: Normal in size. PLEURA: No effusion or pneumothorax. LUNGS: Laterally in the left lower lobe on image 53 is a 2 to 3 mm pulmonary  nodule which is unchanged when compared with the examination of 6/27/2017. This  is a benign nodule and not significant. .  There are normal size lymph nodes adjacent to the descending thoracic aorta and  paraspinal region which have increased in size but remain normal in size. .  Abdomen and pelvis: There has been interval development of adenopathy. In the portacaval space there  is a lymph node that measures 17 mm in short axis. There are retroperitoneal  lymph nodes that are enlarged as well.  The largest lymph node in the  retroperitoneum is posterior to the inferior vena cava beginning at the level of  the right renal artery this extends inferiorly. This measures 2.4 x 1.0 x 4.0  cm. There are also enlarged lymph nodes in the left aortic region largest  measuring 12 mm in short axis. There are also lymph nodes posterior to the head of the pancreas and anterior to  the inferior vena cava which are enlarged the largest measures 10 mm in short  axis  There are enlarged lymph nodes in the interaortocaval space. Enlarged lymph  nodes along the right common iliac artery largest measuring approximately 12 mm  in short axis. There are enlarged lymph nodes in the right external iliac chain  largest measuring 9 mm. There is an enlarged lymph node along the right pelvic sidewall measuring 2.5 x  1.3 cm. There are postsurgical changes in the right inguinal region with one  tiny locule of air. There are residual lymph nodes in the right inguinal largest  measuring 10 mm. .  The uterus images normally. There is a 1 cm cyst in the right ovary. There is no free fluid. Review of the bone windows reveals no destructive lesions. The liver and gallbladder are unremarkable. The spleen is normal in size and  configuration. The pancreas and adrenal glands and kidneys are unremarkable. The aorta and inferior vena cava are unremarkable. IMPRESSION:  1. There is is adenopathy as described. There is adenopathy in the right  inguinal region, along the right pelvic sidewall, along the right iliac chain,  bilateral retroperitoneum, and in the portacaval space as well as posterior to  the pancreas head. 2. In the posterior mediastinum adjacent to the descending thoracic aorta are  lymph nodes that measure less than 1 cm in size but these have increased in the  interval.  3. Please see above text    PET 2/10/20:   FINDINGS:  HEAD/NECK: No apparent foci of abnormal hypermetabolism. Cerebral evaluation is  limited by normal intense activity. CHEST: Mildly hypermetabolic left supraclavicular lymph node, maximum SUV 4.2.   ABDOMEN/PELVIS: Mildly hypermetabolic portacaval, aortocaval, and left  para-aortic lymph nodes are noted, maximum SUV 5.5 of an aortocaval lymph node. Hypermetabolic right common iliac, right external iliac, right obturator, and  right inguinal lymph nodes.   SKELETON: No foci of abnormal hypermetabolism in the axial and visualized  appendicular skeleton. IMPRESSION: Mildly hypermetabolic left supraclavicular, abdominal,  retroperitoneal, and pelvic lymph nodes. Assessment & Plan:   Joey Hare is a 53 y.o. female with Depression comes in for evaluation and management of Follicular lymphoma. 1. Follicular lymphoma: Stage III (based on L supraclav and abd/pelvis LAD). Normal LDH and no cytopenias or B symptoms present. Discussed that this is an indolent lymphoma which does not necessarily recommend treatment at time of diagnosis. Patients are observed with H&P and labs every 3-6 months. Treatment is for those who develop B symptoms, cytopenias or bulky disease (LN >7cm). At this time, I am unclear whether patient's symptoms are related to the disease. Furthermore, there are no \"bulky\" LNs > 7cm, but there is a 4cm LN in the right retroperitoneal LN region which if it increased significantly in size could risk of compression of renal vasculature. Bone marrow biopsy shows < 1% monoclonal CD10 positive B cell population (seen on flow cytometry), which is considered negative for BM involvement. PET shows only mildly hypermetabolic uptake with max SUV of 5.5. Treatment options for Stage III, grade 1/2 FL include observation vs chemoimmunotherapy with BR regimen vs single agent Rituxan. I recommend observation with close interval follow up and repeat CT in 3 months. Pt agrees. If the abdominal pain worsens, single agent Rituximab is a reasonable option to see if shrinkage of the LN helps the abdominal pain. -- Repeat CT of ch/abd/pelvis in 3 months. -- Return in 3 months with labs and MD follow up    2.  Depression / ADHD: On Buproprion, Fluoxetine, Vyvanse. 3. Abdominal and back pain: Unclear etiology and may be related to lymphoma. Could also be due to other factors such as diet, stress. Reviewed healthy options. Emotional well being: Pt is coping well with his/her disease and has excellent support. I appreciate the opportunity to participate in Ms. Panda Hassan care. I spent over 25 minutes face-to-face with patient, and greater than 50% of this time was spent counseling and coordinating care.       Signed By: Sabra Ahumada, MD     February 14, 2020

## 2020-02-10 ENCOUNTER — HOSPITAL ENCOUNTER (OUTPATIENT)
Dept: PET IMAGING | Age: 50
Discharge: HOME | End: 2020-02-10
Attending: INTERNAL MEDICINE
Payer: COMMERCIAL

## 2020-02-10 VITALS — BODY MASS INDEX: 24.01 KG/M2 | HEIGHT: 67 IN | WEIGHT: 153 LBS

## 2020-02-10 DIAGNOSIS — C82.13 FOLLICULAR LYMPHOMA GRADE II OF INTRA-ABDOMINAL LYMPH NODES (HCC): ICD-10-CM

## 2020-02-10 PROCEDURE — A9552 F18 FDG: HCPCS

## 2020-02-10 RX ORDER — SODIUM CHLORIDE 0.9 % (FLUSH) 0.9 %
10 SYRINGE (ML) INJECTION
Status: COMPLETED | OUTPATIENT
Start: 2020-02-10 | End: 2020-02-10

## 2020-02-10 RX ADMIN — Medication 10 ML: at 11:59

## 2020-02-14 ENCOUNTER — OFFICE VISIT (OUTPATIENT)
Dept: ONCOLOGY | Age: 50
End: 2020-02-14

## 2020-02-14 VITALS
BODY MASS INDEX: 24.58 KG/M2 | DIASTOLIC BLOOD PRESSURE: 79 MMHG | HEART RATE: 82 BPM | OXYGEN SATURATION: 98 % | WEIGHT: 156.6 LBS | HEIGHT: 67 IN | SYSTOLIC BLOOD PRESSURE: 118 MMHG | TEMPERATURE: 97.5 F

## 2020-02-14 DIAGNOSIS — R10.31 RIGHT LOWER QUADRANT ABDOMINAL PAIN: ICD-10-CM

## 2020-02-14 DIAGNOSIS — C82.13 FOLLICULAR LYMPHOMA GRADE II OF INTRA-ABDOMINAL LYMPH NODES (HCC): Primary | ICD-10-CM

## 2020-02-14 NOTE — PROGRESS NOTES
Norville Cowden is a 53 y.o. female here for follow up of lymphoma. Patient with complaints of abdominal cramping, rates as a 2 out of 10.

## 2020-04-23 ENCOUNTER — TELEPHONE (OUTPATIENT)
Dept: ONCOLOGY | Age: 50
End: 2020-04-23

## 2020-04-23 NOTE — TELEPHONE ENCOUNTER
[address] at [facility]  [phone]        04/23/20 1:06 PM Attempted to call patient back via contact number provided. No answer, left message with office phone number for patient to call back. Per lv May to convert 05/14 appointment to a telemedicine visit. Patient should also have labs drawn (CBC, BMP, and LD) at [address] [place] [address] prior to her office visit with Dr. Nuvia Magaña. Patient should contact lab location of her preference to ensure they are open and schedule appointment for labs. 04/24/20 1:21 PM Attempted to call patient via home/cell number regarding above. No answer, left message requesting that patient return call. Provided office phone number as well.     04/27/20 2:51 PM Left additional message via contact number provided. Have attempted to contact patient x 3 with no return call yet. Will forward to Dr. Nuvia Magaña and Estrella Almeida to make aware. 2:58 PM Received return call from patient. Discussed above. Patient reluctant to go to [address] [place] [address] but plans to have labs drawn at [facility] lab. Patient also agreeable to change 05/14 appointment to telemedicine visit. No further questions or concerns at this time.

## 2020-04-23 NOTE — TELEPHONE ENCOUNTER
Patient want to know if her next visit can be virtual     Also asking about when she should do labs    And she has her Ct scan scheduled already

## 2020-05-04 DIAGNOSIS — C82.13 FOLLICULAR LYMPHOMA GRADE II OF INTRA-ABDOMINAL LYMPH NODES (HCC): ICD-10-CM

## 2020-05-07 ENCOUNTER — HOSPITAL ENCOUNTER (OUTPATIENT)
Dept: CT IMAGING | Age: 50
Discharge: HOME | End: 2020-05-07
Attending: INTERNAL MEDICINE
Payer: COMMERCIAL

## 2020-05-07 DIAGNOSIS — C82.13 FOLLICULAR LYMPHOMA GRADE II OF INTRA-ABDOMINAL LYMPH NODES (HCC): ICD-10-CM

## 2020-05-07 PROCEDURE — 74011636320 HC RX REV CODE- 636/320: Performed by: INTERNAL MEDICINE

## 2020-05-07 PROCEDURE — 74177 CT ABD & PELVIS W/CONTRAST: CPT

## 2020-05-07 RX ADMIN — IOPAMIDOL 100 ML: 755 INJECTION, SOLUTION INTRAVENOUS at 11:00

## 2020-05-08 ENCOUNTER — HOSPITAL ENCOUNTER (OUTPATIENT)
Dept: LAB | Age: 50
Discharge: HOME | End: 2020-05-08

## 2020-05-08 LAB
BASOPHILS # BLD: 0.1 K/UL (ref 0–0.1)
BASOPHILS NFR BLD: 1 % (ref 0–1)
COMMENT, HOLDF: NORMAL
DIFFERENTIAL METHOD BLD: NORMAL
EOSINOPHIL # BLD: 0.2 K/UL (ref 0–0.4)
EOSINOPHIL NFR BLD: 3 % (ref 0–7)
ERYTHROCYTE [DISTWIDTH] IN BLOOD BY AUTOMATED COUNT: 11.7 % (ref 11.5–14.5)
HCT VFR BLD AUTO: 41.1 % (ref 35–47)
HGB BLD-MCNC: 13.6 G/DL (ref 11.5–16)
IMM GRANULOCYTES # BLD AUTO: 0 K/UL (ref 0–0.04)
IMM GRANULOCYTES NFR BLD AUTO: 0 % (ref 0–0.5)
LYMPHOCYTES # BLD: 2.6 K/UL (ref 0.8–3.5)
LYMPHOCYTES NFR BLD: 35 % (ref 12–49)
MCH RBC QN AUTO: 31.9 PG (ref 26–34)
MCHC RBC AUTO-ENTMCNC: 33.1 G/DL (ref 30–36.5)
MCV RBC AUTO: 96.5 FL (ref 80–99)
MONOCYTES # BLD: 0.7 K/UL (ref 0–1)
MONOCYTES NFR BLD: 9 % (ref 5–13)
NEUTS SEG # BLD: 3.8 K/UL (ref 1.8–8)
NEUTS SEG NFR BLD: 52 % (ref 32–75)
NRBC # BLD: 0 K/UL (ref 0–0.01)
NRBC BLD-RTO: 0 PER 100 WBC
PLATELET # BLD AUTO: 338 K/UL (ref 150–400)
PMV BLD AUTO: 9.3 FL (ref 8.9–12.9)
RBC # BLD AUTO: 4.26 M/UL (ref 3.8–5.2)
SAMPLES BEING HELD,HOLD: NORMAL
WBC # BLD AUTO: 7.4 K/UL (ref 3.6–11)

## 2020-05-09 LAB
ANION GAP SERPL CALC-SCNC: 6 MMOL/L (ref 5–15)
BUN SERPL-MCNC: 12 MG/DL (ref 6–20)
BUN/CREAT SERPL: 17 (ref 12–20)
CALCIUM SERPL-MCNC: 9.5 MG/DL (ref 8.5–10.1)
CHLORIDE SERPL-SCNC: 104 MMOL/L (ref 97–108)
CO2 SERPL-SCNC: 26 MMOL/L (ref 21–32)
CREAT SERPL-MCNC: 0.69 MG/DL (ref 0.55–1.02)
GLUCOSE SERPL-MCNC: 98 MG/DL (ref 65–100)
LDH SERPL L TO P-CCNC: 160 U/L (ref 81–246)
POTASSIUM SERPL-SCNC: 3.7 MMOL/L (ref 3.5–5.1)
SODIUM SERPL-SCNC: 136 MMOL/L (ref 136–145)

## 2020-05-12 NOTE — PROGRESS NOTES
[id number] [facility]  [phone]    Hematology / Oncology Established Visit    Reason for Visit:   Jose Oden is a 53 y.o. female who is seen by synchronous (real-time) audio-video technology on 5/14/2020 for follow up of lymphoma. PCP is Dr. Annamarie Boyer. Hematology Oncology Treatment History:     Diagnosis: Follicular lymphoma    Stage: III; FLIPI2 low risk    Pathology:   -1/20/20 Right inguinal LN excision: Follicular lymphoma, grade 1-2, follicular pattern, KQ81 positive. Comment:   The LN specimen demonstrates increased follicles, lacking normal germinal centers. IHC markers are performed with good controls. Tumor cells are positive for CD20 and BCL2. CD3 marks background T cells. Cyclin D1 is negative in the lymphoid population. CD21 highlights intact dendritic meshworks. Flow cytometry shows positive expression of CD10 and kappa LC restriction. Ki-67 is pending. Focally there are a few follicles with greater than fifteen centroblasts per HPF; however the majority of follicles have less than 15 centroblasts/HPF, consistent with grade 1-2. Diffuse areas are not seen.    -2/5/20 Bone marrow biopsy: Normocellular marrow with multilineage hematopoiesis and <1% involvement by a CD10 positive B-cell lymphoproliferative disorder   Negative for immunophenotypic or morphologic evidence of dysplasia no increase in blasts. Comment   Flow cytometric analysis reveals a small population of CD10 positive clonal B cells compatible with involvement by the patient's recently diagnosed follicular lymphoma. FISH studies are pending and will be reported. Prior Treatment: None    Current Treatment: Observation    History of Present Illness:   Jose Oden is a 53 y.o. female who comes in for evaluation of Follicular lymphoma. She states she has had vague symptoms for several years as follows.  Pt reports h/o pruritus which started 6 yrs ago, which finally subsided with Zyrtec. She saw a hematologist at that time, who told her symptoms might surface as a blood disorder in the future. In 2016, pt went to see her Gyn (Dr. Delmis Hale) for abdominal cramping. Workup normal at time time. In summer of 2019, pt developed fatigue as well as same of the chronic pain in abdominal area. In fall of 2019, she developed memory issues with work. She went to see her Psychiatrist due to these symptoms and switched her from generic Wellbutrin to brand name Wellbutrin. No improvement after 3-4 weeks. She was okay with sleeping during that time, but continued to have severe fatigue out of character for her. She went to see her PCP and described a right inguinal LN enlargement. She went to see a surgeon, Dr. Monique Saavedra. Ultrasound of that region did demonstrate an enlarged LN. FNA was inconclusive. Core needle biopsy was suspicious for B cell lymphoma. Excisional LN biopsy 0/56/22 revealed follicular lymphoma. CT of chest/abd/pelvis was notable for retroperitoneal, pelvic and portacaval LAD. Pt also reports left hip pain, left foot tingling. No fevers, chills, weight loss. She does have occasional sweats more notable a few months ago, but not drenching or nightly. Patient underwent staging bone marrow biopsy and PET scan. Interval History:  She is seen for f/u of lymphoma. Less pain in the lower abdomen. Back pain has also improved a great. No f/c/sweats. Has had numbness in her R toe. Dry cough for a few weeks, but no SOB or chest tightness. Had a lot of pruritus on her abdomen and legs a week ago. This has been intermittent.      Past Medical History:   Diagnosis Date    Chronic pain     Depression 2/10/2009    Fibrocystic breast 2/10/2009      Past Surgical History:   Procedure Laterality Date    HX BREAST BIOPSY Bilateral 20 + yrs ago    neg    HX HEENT      wisdom teeth extraction      Social History     Tobacco Use    Smoking status: Former Smoker     Last attempt to quit: 1/28/2000 Years since quittin.3    Smokeless tobacco: Never Used   Substance Use Topics    Alcohol use: Yes     Alcohol/week: 4.2 standard drinks     Types: 5 Glasses of wine per week     Comment: occassionally      Family History   Problem Relation Age of Onset    Cancer Mother         breast-63    Breast Cancer Mother 61    Stroke Father     Diabetes Neg Hx     Heart Disease Neg Hx     Hypertension Neg Hx      Current Outpatient Medications   Medication Sig    ADDERALL XR 30 mg XR capsule     FLUoxetine (PROZAC) 40 mg capsule     buPROPion XL (WELLBUTRIN XL) 150 mg tablet 150 mg every morning.  VYVANSE 30 mg capsule      No current facility-administered medications for this visit. No Known Allergies     Review of Systems: A complete review of systems was obtained, negative except as described above. Physical Exam:       Due to this being a TeleHealth evaluation, many elements of the physical examination are unable to be assessed. Constitutional: Appears well-developed and well-nourished in no apparent distress   Mental status: Alert and awake, Oriented to person/place/time, Able to follow commands  Eyes: EOM normal, Sclera normal, No visible ocular discharge  HENT: Normocephalic, atraumatic; Mouth/Throat: Moist mucous membranes, External Ears normal  Neck: No visualized mass  Pulmonary/Chest: Respiratory effort normal, No visualized signs of difficulty breathing or respiratory distress   Musculoskeletal: Normal gait with no signs of ataxia, Normal range of motion of neck  Neurological: No facial asymmetry (Cranial nerve 7 motor function), No gaze palsy  Skin: No significant exanthematous lesions or discoloration noted on facial skin  Psychiatric: Normal affect, normal judgment/insight.  No hallucinations       Results:     Lab Results   Component Value Date/Time    WBC 7.4 2020 03:45 PM    HGB 13.6 2020 03:45 PM    HCT 41.1 2020 03:45 PM    PLATELET 417  03:45 PM    MCV 96.5 05/08/2020 03:45 PM    ABS. NEUTROPHILS 3.8 05/08/2020 03:45 PM     Lab Results   Component Value Date/Time    Sodium 136 05/08/2020 03:45 PM    Potassium 3.7 05/08/2020 03:45 PM    Chloride 104 05/08/2020 03:45 PM    CO2 26 05/08/2020 03:45 PM    Glucose 98 05/08/2020 03:45 PM    BUN 12 05/08/2020 03:45 PM    Creatinine 0.69 05/08/2020 03:45 PM    GFR est AA >60 05/08/2020 03:45 PM    GFR est non-AA >60 05/08/2020 03:45 PM    Calcium 9.5 05/08/2020 03:45 PM     Lab Results   Component Value Date/Time    Bilirubin, total 0.3 01/24/2020 08:13 AM    ALT (SGPT) 19 01/24/2020 08:13 AM    AST (SGOT) 15 01/24/2020 08:13 AM    Alk. phosphatase 57 01/24/2020 08:13 AM    Protein, total 7.0 01/24/2020 08:13 AM    Albumin 4.2 01/24/2020 08:13 AM    Globulin 2.8 01/24/2020 08:13 AM     No results found for: IRON, FE, TIBC, IBCT, PSAT, FERR    No results found for: B12LT, FOL, RBCF  Lab Results   Component Value Date/Time    TSH 1.830 09/25/2019 12:45 PM     Lab Results   Component Value Date/Time    Hepatitis B surface Ab <3.10 01/24/2020 08:13 AM         Imaging:     Chest CT  1/24/20:  FINDINGS:  The normal-sized axillary lymph nodes are unchanged. THYROID: No nodule. MEDIASTINUM: No mass or lymphadenopathy. MARCE: No mass or lymphadenopathy. THORACIC AORTA: No aneurysm. MAIN PULMONARY ARTERY: Normal in caliber. TRACHEA/BRONCHI: Patent. ESOPHAGUS: No wall thickening or dilatation. HEART: Normal in size. PLEURA: No effusion or pneumothorax. LUNGS: Laterally in the left lower lobe on image 53 is a 2 to 3 mm pulmonary  nodule which is unchanged when compared with the examination of 6/27/2017. This  is a benign nodule and not significant. .  There are normal size lymph nodes adjacent to the descending thoracic aorta and  paraspinal region which have increased in size but remain normal in size. .  Abdomen and pelvis: There has been interval development of adenopathy.  In the portacaval space there  is a lymph node that measures 17 mm in short axis. There are retroperitoneal  lymph nodes that are enlarged as well. The largest lymph node in the  retroperitoneum is posterior to the inferior vena cava beginning at the level of  the right renal artery this extends inferiorly. This measures 2.4 x 1.0 x 4.0  cm. There are also enlarged lymph nodes in the left aortic region largest  measuring 12 mm in short axis. There are also lymph nodes posterior to the head of the pancreas and anterior to  the inferior vena cava which are enlarged the largest measures 10 mm in short  axis  There are enlarged lymph nodes in the interaortocaval space. Enlarged lymph  nodes along the right common iliac artery largest measuring approximately 12 mm  in short axis. There are enlarged lymph nodes in the right external iliac chain  largest measuring 9 mm. There is an enlarged lymph node along the right pelvic sidewall measuring 2.5 x  1.3 cm. There are postsurgical changes in the right inguinal region with one  tiny locule of air. There are residual lymph nodes in the right inguinal largest  measuring 10 mm. .  The uterus images normally. There is a 1 cm cyst in the right ovary. There is no free fluid. Review of the bone windows reveals no destructive lesions. The liver and gallbladder are unremarkable. The spleen is normal in size and  configuration. The pancreas and adrenal glands and kidneys are unremarkable. The aorta and inferior vena cava are unremarkable. IMPRESSION:  1. There is is adenopathy as described. There is adenopathy in the right  inguinal region, along the right pelvic sidewall, along the right iliac chain,  bilateral retroperitoneum, and in the portacaval space as well as posterior to  the pancreas head.   2. In the posterior mediastinum adjacent to the descending thoracic aorta are  lymph nodes that measure less than 1 cm in size but these have increased in the  interval.  3. Please see above text    PET 2/10/20: FINDINGS:  HEAD/NECK: No apparent foci of abnormal hypermetabolism. Cerebral evaluation is  limited by normal intense activity. CHEST: Mildly hypermetabolic left supraclavicular lymph node, maximum SUV 4.2. ABDOMEN/PELVIS: Mildly hypermetabolic portacaval, aortocaval, and left  para-aortic lymph nodes are noted, maximum SUV 5.5 of an aortocaval lymph node. Hypermetabolic right common iliac, right external iliac, right obturator, and  right inguinal lymph nodes.   SKELETON: No foci of abnormal hypermetabolism in the axial and visualized  appendicular skeleton. IMPRESSION: Mildly hypermetabolic left supraclavicular, abdominal,  retroperitoneal, and pelvic lymph nodes. Ch/abd/pelvis CT 5/7/20:  Lymph nodes in chest: There is no new mediastinal, hilar or axillary lymphadenopathy. Subcentimeter bilateral axillary lymph nodes are unchanged. Subcentimeter  posterior mediastinal lymph node abutting the descending thoracic aorta is  unchanged compared to prior CT dated January 2020. 1.3 cm x 1.1 cm left  supraclavicular lymph node is unchanged compared to prior CT dated January 2020. Lymph nodes in abdom/pelvis: There is a prominent portacaval lymph node measuring approximately  1.6 cm x 2.3 cm, unchanged compared to prior CT dated January 2020. There are multiple prominent and mildly enlarged retroperitoneal lymph nodes, unchanged  compared to prior CT dated January 2020. For example, there is a left  para-aortic retroperitoneal lymph node measures 1.2 cm x 1.6 cm, unchanged  compared to prior CT dated January 2020. As an additional example, there is a 9  mm x 1.3 cm aortocaval lymph node, unchanged compared to prior CT dated January 2020. Right iliac lymph nodes are unchanged compared to prior CT dated January 2020. For example, the largest right iliac lymph node measures 1.1 cm x 1.4 cm,  unchanged compared to prior CT dated January 2020.  Right internal obturator  lymph node is unchanged compared to prior CT dated January 2020) measuring  approximately 2.8 cm x 1.4 cm. Prominent right inguinal lymph nodes are not  significantly changed in size. For example, there is a 1 cm x 1.4 cm right  inguinal lymph node, unchanged compared to prior CT dated January 2020. IMPRESSION: No interval change. Discussed with radiology - the right RP LN near R renal artery is unchanged. The 4cm dimension is craniocaudal.    Assessment & Plan:   Chuck Ledbetter is a 53 y.o. female with Depression comes in for evaluation and management of Follicular lymphoma. 1. Follicular lymphoma: Stage III (based on L supraclav and abd/pelvis LAD). Normal LDH and no cytopenias or B symptoms present. Discussed that this is an indolent lymphoma which does not necessarily recommend treatment at time of diagnosis. Patients are observed with H&P and labs every 3-6 months. Treatment is for those who develop B symptoms, cytopenias or bulky disease (LN >7cm). At this time, I am unclear whether patient's symptoms are related to the disease. Furthermore, there are no \"bulky\" LNs > 7cm, but there is a 4cm LN in the right retroperitoneal LN region which if it increased significantly in size could risk of compression of renal vasculature. Bone marrow biopsy shows < 1% monoclonal CD10 positive B cell population (seen on flow cytometry), which is considered negative for BM involvement. PET shows only mildly hypermetabolic uptake with max SUV of 5.5. Treatment options for Stage III, grade 1/2 FL include observation vs chemoimmunotherapy with BR regimen vs single agent Rituxan. I recommend continued observation at this time. Repeat CT in 5/2020 shows no interval change compared to CT in Jan 2020. If the abdominal pain worsens in the future, single agent Rituximab is a reasonable option to see if shrinkage of the LN helps the abdominal pain.    -- Continue observation  -- Return in 3 months with labs and MD follow up  -- Repeat CT of ch/abd/pelvis in 6 months. 2. Depression / ADHD: On Buproprion, Fluoxetine, Vyvanse. 3. Abdominal and back pain: Improved compared to prior although size of LNs has not changed. Unclear etiology and may or may not be related to the lymphoma. Could also be due to other factors such as diet, stress. Reviewed healthy options. 4. Intermittent pruritus: Unclear etiology and may be related to allergic rhinitis. However, pt had severe episode of pruritus several years ago. Emotional well being: Pt is coping well with his/her disease and has excellent support. I appreciate the opportunity to participate in Ms. Martine Snow care. Total physician time spent on this encounter was 40 minutes (reviewing imaging, discussing with radiology as well as history, limited visual physical exam and physician documentation). I was in the office while conducting this encounter. The patient was at her at home    Consent:  She and/or her healthcare decision maker is aware that this patient-initiated Telehealth encounter is a billable service, with coverage as determined by her insurance carrier. She is aware that she may receive a bill and has provided verbal consent to proceed: Yes    Pursuant to the emergency declaration under the [address] and the [facility], [place], [address] waiver authority and the [employer] and [employer] Act, this Virtual  Visit was conducted, with patient's (and/or legal guardian's) consent, to reduce the patient's risk of exposure to COVID-19 and provide necessary medical care. Services were provided through a video synchronous discussion virtually to substitute for in-person visit.     Signed By: Ani Krueger MD     May 14, 2020

## 2020-05-14 ENCOUNTER — VIRTUAL VISIT (OUTPATIENT)
Dept: ONCOLOGY | Age: 50
End: 2020-05-14

## 2020-05-14 DIAGNOSIS — F90.9 ATTENTION DEFICIT HYPERACTIVITY DISORDER (ADHD), UNSPECIFIED ADHD TYPE: ICD-10-CM

## 2020-05-14 DIAGNOSIS — L29.9 PRURITUS: ICD-10-CM

## 2020-05-14 DIAGNOSIS — R10.31 RIGHT LOWER QUADRANT ABDOMINAL PAIN: ICD-10-CM

## 2020-05-14 DIAGNOSIS — C82.13 FOLLICULAR LYMPHOMA GRADE II OF INTRA-ABDOMINAL LYMPH NODES (HCC): Primary | ICD-10-CM

## 2020-05-14 RX ORDER — DEXTROAMPHETAMINE SACCHARATE, AMPHETAMINE ASPARTATE, DEXTROAMPHETAMINE SULFATE AND AMPHETAMINE SULFATE 5; 5; 5; 5 MG/1; MG/1; MG/1; MG/1
20 TABLET ORAL 2 TIMES DAILY
COMMUNITY
Start: 2020-04-17 | End: 2020-08-17 | Stop reason: ALTCHOICE

## 2020-05-14 NOTE — PROGRESS NOTES
Jodee Burnett is a 53 y.o. female here for follow up of lymphoma. Patient with no complaints of pain at this time.

## 2020-07-01 ENCOUNTER — TELEPHONE (OUTPATIENT)
Dept: INTERNAL MEDICINE CLINIC | Age: 50
End: 2020-07-01

## 2020-07-01 NOTE — TELEPHONE ENCOUNTER
Told her she could do [facility] or [facility] or [facility]- she understands

## 2020-07-01 NOTE — TELEPHONE ENCOUNTER
----- Message from Kyle Henderson sent at 7/1/2020  2:53 PM EDT -----  Regarding: DR VARGAS [facility] / [place] Message/Vendor Calls    Pt is requesting to get a COVID-19 test, due to vacation to [facility].   This test needs to be completed 7 days of arrival.        Callback required     Best contact number(s): [phone]              Kyle Henderson

## 2020-07-27 NOTE — PROGRESS NOTES
[id number] [facility] at [facility]  [phone]    Hematology / Oncology Established Visit    Reason for Visit:   Wendy Gates is a 53 y.o. female who is seen for follow up of lymphoma. PCP is Dr. Zia Purvis. Hematology Oncology Treatment History:     Diagnosis: Follicular lymphoma    Stage: III; FLIPI2 low risk    Pathology:   -1/20/20 Right inguinal LN excision: Follicular lymphoma, grade 1-2, follicular pattern, PY51 positive. Comment:   The LN specimen demonstrates increased follicles, lacking normal germinal centers. IHC markers are performed with good controls. Tumor cells are positive for CD20 and BCL2. CD3 marks background T cells. Cyclin D1 is negative in the lymphoid population. CD21 highlights intact dendritic meshworks. Flow cytometry shows positive expression of CD10 and kappa LC restriction. Ki-67 is pending. Focally there are a few follicles with greater than fifteen centroblasts per HPF; however the majority of follicles have less than 15 centroblasts/HPF, consistent with grade 1-2. Diffuse areas are not seen.    -2/5/20 Bone marrow biopsy: Normocellular marrow with multilineage hematopoiesis and <1% involvement by a CD10 positive B-cell lymphoproliferative disorder   Negative for immunophenotypic or morphologic evidence of dysplasia no increase in blasts. Comment   Flow cytometric analysis reveals a small population of CD10 positive clonal B cells compatible with involvement by the patient's recently diagnosed follicular lymphoma. FISH studies are pending and will be reported. Prior Treatment: None    Current Treatment: Observation    History of Present Illness:   Wendy Gates is a 53 y.o. female who comes in for evaluation of Follicular lymphoma. She states she has had vague symptoms for several years as follows. Pt reports h/o pruritus which started 6 yrs ago, which finally subsided with Zyrtec.  She saw a hematologist at that time, who told her symptoms might surface as a blood disorder in the future. In 2016, pt went to see her Gyn (Dr. Renan Melchor) for abdominal cramping. Workup normal at time time. In summer of 2019, pt developed fatigue as well as same of the chronic pain in abdominal area. In fall of 2019, she developed memory issues with work. She went to see her Psychiatrist due to these symptoms and switched her from generic Wellbutrin to brand name Wellbutrin. No improvement after 3-4 weeks. She was okay with sleeping during that time, but continued to have severe fatigue out of character for her. She went to see her PCP and described a right inguinal LN enlargement. She went to see a surgeon, Dr. Tierra Loaiza. Ultrasound of that region did demonstrate an enlarged LN. FNA was inconclusive. Core needle biopsy was suspicious for B cell lymphoma. Excisional LN biopsy 7/41/56 revealed follicular lymphoma. CT of chest/abd/pelvis was notable for retroperitoneal, pelvic and portacaval LAD. Pt also reports left hip pain, left foot tingling. No fevers, chills, weight loss. She does have occasional sweats more notable a few months ago, but not drenching or nightly. Patient underwent staging bone marrow biopsy and PET scan. Interval History:  She is seen for f/u of lymphoma. She states she is doing overall well, but felt terrible in July. Distended belly (constipated at that time). She had neck/throat burning pain, dry cough, tongue sore, left axillary pain without any lumps. No fevers, chills at that time. In general, she states she currently has no abdominal pain but does report prior intermittent cramping abdominal pain now in L > R. When asked about menstrual periods, she reports no menstrual periods for 3 months followed by a menstrual period recently. No hot flashes. She saw [facility] for a 2nd opinion and was told these symptoms are not related to lymphoma.       Past Medical History:   Diagnosis Date    Chronic pain     Depression 2/10/2009    Fibrocystic breast 2/10/2009      Past Surgical History:   Procedure Laterality Date    HX BREAST BIOPSY Bilateral 20 + yrs ago    neg    HX HEENT      wisdom teeth extraction      Social History     Tobacco Use    Smoking status: Former Smoker     Last attempt to quit: 2000     Years since quittin.5    Smokeless tobacco: Never Used   Substance Use Topics    Alcohol use: Yes     Alcohol/week: 4.2 standard drinks     Types: 5 Glasses of wine per week     Comment: occassionally      Family History   Problem Relation Age of Onset    Cancer Mother         breast-63    Breast Cancer Mother 61    Stroke Father     Diabetes Neg Hx     Heart Disease Neg Hx     Hypertension Neg Hx      Current Outpatient Medications   Medication Sig    dextroamphetamine-amphetamine (ADDERALL) 20 mg tablet Take 20 mg by mouth two (2) times a day.  ADDERALL XR 30 mg XR capsule     FLUoxetine (PROZAC) 40 mg capsule     buPROPion XL (WELLBUTRIN XL) 150 mg tablet 150 mg every morning.  VYVANSE 30 mg capsule      No current facility-administered medications for this visit. No Known Allergies     Review of Systems: A complete review of systems was obtained, negative except as described above. Physical Exam:     Visit Vitals  /76 (BP 1 Location: Left arm, BP Patient Position: Sitting)   Pulse 85   Temp 97.3 °F (36.3 °C) (Temporal)   Resp 18   Ht 5' 7\" (1.702 m)   Wt 155 lb 12.8 oz (70.7 kg)   SpO2 98%   BMI 24.40 kg/m²     ECOG PS: 0  General: Well developed, no acute distress  Eyes: PERRLA, EOMI, anicteric sclerae  HENT: Atraumatic, OP clear, TMs intact without erythema  Neck: Supple  Lymphatic: No cervical, supraclavicular, axillary or inguinal adenopathy  Respiratory: CTAB, normal respiratory effort  CV: Normal rate, regular rhythm, no murmurs, no peripheral edema  GI: Soft, nontender, nondistended, no masses, no hepatomegaly, no splenomegaly  MS: Normal gait and station. Digits without clubbing or cyanosis. Skin: No rashes, ecchymoses, or petechiae. Normal temperature, turgor, and texture. Neuro/Psych: Alert, oriented. 5/5 strength in all 4 extremities. Appropriate affect, normal judgment/insight. Results:     Lab Results   Component Value Date/Time    WBC 7.4 05/08/2020 03:45 PM    HGB 13.6 05/08/2020 03:45 PM    HCT 41.1 05/08/2020 03:45 PM    PLATELET 621 02/15/0823 03:45 PM    MCV 96.5 05/08/2020 03:45 PM    ABS. NEUTROPHILS 3.8 05/08/2020 03:45 PM     Lab Results   Component Value Date/Time    Sodium 136 05/08/2020 03:45 PM    Potassium 3.7 05/08/2020 03:45 PM    Chloride 104 05/08/2020 03:45 PM    CO2 26 05/08/2020 03:45 PM    Glucose 98 05/08/2020 03:45 PM    BUN 12 05/08/2020 03:45 PM    Creatinine 0.69 05/08/2020 03:45 PM    GFR est AA >60 05/08/2020 03:45 PM    GFR est non-AA >60 05/08/2020 03:45 PM    Calcium 9.5 05/08/2020 03:45 PM     Lab Results   Component Value Date/Time    Bilirubin, total 0.3 01/24/2020 08:13 AM    ALT (SGPT) 19 01/24/2020 08:13 AM    Alk. phosphatase 57 01/24/2020 08:13 AM    Protein, total 7.0 01/24/2020 08:13 AM    Albumin 4.2 01/24/2020 08:13 AM    Globulin 2.8 01/24/2020 08:13 AM     No results found for: IRON, FE, TIBC, IBCT, PSAT, FERR    No results found for: B12LT, FOL, RBCF  Lab Results   Component Value Date/Time    TSH 1.830 09/25/2019 12:45 PM     Lab Results   Component Value Date/Time    Hepatitis B surface Ab <3.10 01/24/2020 08:13 AM         Imaging:     Chest CT  1/24/20:  FINDINGS:  The normal-sized axillary lymph nodes are unchanged. THYROID: No nodule. MEDIASTINUM: No mass or lymphadenopathy. MARCE: No mass or lymphadenopathy. THORACIC AORTA: No aneurysm. MAIN PULMONARY ARTERY: Normal in caliber. TRACHEA/BRONCHI: Patent. ESOPHAGUS: No wall thickening or dilatation. HEART: Normal in size. PLEURA: No effusion or pneumothorax.   LUNGS: Laterally in the left lower lobe on image 53 is a 2 to 3 mm pulmonary  nodule which is unchanged when compared with the examination of 6/27/2017. This  is a benign nodule and not significant. .  There are normal size lymph nodes adjacent to the descending thoracic aorta and  paraspinal region which have increased in size but remain normal in size. .  Abdomen and pelvis: There has been interval development of adenopathy. In the portacaval space there  is a lymph node that measures 17 mm in short axis. There are retroperitoneal  lymph nodes that are enlarged as well. The largest lymph node in the  retroperitoneum is posterior to the inferior vena cava beginning at the level of  the right renal artery this extends inferiorly. This measures 2.4 x 1.0 x 4.0  cm. There are also enlarged lymph nodes in the left aortic region largest  measuring 12 mm in short axis. There are also lymph nodes posterior to the head of the pancreas and anterior to  the inferior vena cava which are enlarged the largest measures 10 mm in short  axis  There are enlarged lymph nodes in the interaortocaval space. Enlarged lymph  nodes along the right common iliac artery largest measuring approximately 12 mm  in short axis. There are enlarged lymph nodes in the right external iliac chain  largest measuring 9 mm. There is an enlarged lymph node along the right pelvic sidewall measuring 2.5 x  1.3 cm. There are postsurgical changes in the right inguinal region with one  tiny locule of air. There are residual lymph nodes in the right inguinal largest  measuring 10 mm. .  The uterus images normally. There is a 1 cm cyst in the right ovary. There is no free fluid. Review of the bone windows reveals no destructive lesions. The liver and gallbladder are unremarkable. The spleen is normal in size and  configuration. The pancreas and adrenal glands and kidneys are unremarkable. The aorta and inferior vena cava are unremarkable. IMPRESSION:  1. There is is adenopathy as described.  There is adenopathy in the right  inguinal region, along the right pelvic sidewall, along the right iliac chain,  bilateral retroperitoneum, and in the portacaval space as well as posterior to  the pancreas head. 2. In the posterior mediastinum adjacent to the descending thoracic aorta are  lymph nodes that measure less than 1 cm in size but these have increased in the  interval.  3. Please see above text    PET 2/10/20:   FINDINGS:  HEAD/NECK: No apparent foci of abnormal hypermetabolism. Cerebral evaluation is  limited by normal intense activity. CHEST: Mildly hypermetabolic left supraclavicular lymph node, maximum SUV 4.2. ABDOMEN/PELVIS: Mildly hypermetabolic portacaval, aortocaval, and left  para-aortic lymph nodes are noted, maximum SUV 5.5 of an aortocaval lymph node. Hypermetabolic right common iliac, right external iliac, right obturator, and  right inguinal lymph nodes.   SKELETON: No foci of abnormal hypermetabolism in the axial and visualized  appendicular skeleton. IMPRESSION: Mildly hypermetabolic left supraclavicular, abdominal,  retroperitoneal, and pelvic lymph nodes. Ch/abd/pelvis CT 5/7/20:  Lymph nodes in chest: There is no new mediastinal, hilar or axillary lymphadenopathy. Subcentimeter bilateral axillary lymph nodes are unchanged. Subcentimeter  posterior mediastinal lymph node abutting the descending thoracic aorta is  unchanged compared to prior CT dated January 2020. 1.3 cm x 1.1 cm left  supraclavicular lymph node is unchanged compared to prior CT dated January 2020. Lymph nodes in abdom/pelvis: There is a prominent portacaval lymph node measuring approximately  1.6 cm x 2.3 cm, unchanged compared to prior CT dated January 2020. There are multiple prominent and mildly enlarged retroperitoneal lymph nodes, unchanged  compared to prior CT dated January 2020. For example, there is a left  para-aortic retroperitoneal lymph node measures 1.2 cm x 1.6 cm, unchanged  compared to prior CT dated January 2020.  As an additional example, there is a 9  mm x 1.3 cm aortocaval lymph node, unchanged compared to prior CT dated January 2020. Right iliac lymph nodes are unchanged compared to prior CT dated January 2020. For example, the largest right iliac lymph node measures 1.1 cm x 1.4 cm,  unchanged compared to prior CT dated January 2020. Right internal obturator  lymph node is unchanged compared to prior CT dated January 2020) measuring  approximately 2.8 cm x 1.4 cm. Prominent right inguinal lymph nodes are not  significantly changed in size. For example, there is a 1 cm x 1.4 cm right  inguinal lymph node, unchanged compared to prior CT dated January 2020. IMPRESSION: No interval change. Discussed with radiology - the right RP LN near R renal artery is unchanged. The 4cm dimension is craniocaudal.    Assessment & Plan:   Joel Reyes is a 53 y.o. female with Depression comes in for evaluation and management of Follicular lymphoma. 1. Follicular lymphoma: Stage III (based on L supraclav and abd/pelvis LAD). Normal LDH and no cytopenias or B symptoms present. Discussed that this is an indolent lymphoma which does not necessarily recommend treatment at time of diagnosis. Patients are observed with H&P and labs every 3-6 months. Treatment is for those who develop B symptoms, cytopenias or bulky disease (LN >7cm). At this time, I am unclear whether patient's symptoms are related to the disease. Furthermore, there are no \"bulky\" LNs > 7cm, but there is a 4cm LN in the right retroperitoneal LN region which if it increased significantly in size could risk of compression of renal vasculature. Bone marrow biopsy shows < 1% monoclonal CD10 positive B cell population (seen on flow cytometry), which is considered negative for BM involvement. PET shows only mildly hypermetabolic uptake with max SUV of 5.5. Treatment options for Stage III, grade 1/2 FL include observation vs chemoimmunotherapy with BR regimen vs single agent Rituxan.  I recommend continued observation at this time. Repeat CT in 5/2020 shows no interval change compared to CT in Jan 2020. If the abdominal pain worsens in the future, single agent Rituximab is a reasonable option to see if shrinkage of the LN helps the abdominal pain. -- Continue observation  -- Repeat CT of ch/abd/pelvis due in 11/2020.   -- Return in 3 months with labs and MD follow up    2. Depression / ADHD: On Buproprion, Fluoxetine, Vyvanse. 3. Abdominal and back pain: Improved compared to prior although size of LNs has not changed. Unclear etiology and likely unrelated to the lymphoma. I wonder if these symptoms are due to her being in the shon-menopausal state causing GI upset. Could also be due to other factors such as diet, stress. Reviewed healthy options. 4. Intermittent pruritus: Unclear etiology and may be related to allergic rhinitis. However, pt had severe episode of pruritus several years ago. Emotional well being: Pt is coping well with his/her disease and has excellent support. I appreciate the opportunity to participate in Ms. Price Ring care.     Signed By: Trace Zelaya MD     August 17, 2020

## 2020-08-07 ENCOUNTER — TELEPHONE (OUTPATIENT)
Dept: ONCOLOGY | Age: 50
End: 2020-08-07

## 2020-08-07 NOTE — TELEPHONE ENCOUNTER
[address] [facility]   Social Work Navigator Encounter     Patient Name:  Angelique Nolasco    Medical History: lymphoma    Advance Directives: none on file; conversation deferred    Narrative: Received call from patient requesting contact information for [employer]. Patient advised she would like to speak to a nutritionist. Explained services offered at [facility] such as virtual mediation and yoga and provided contact information. Offered to request Lexa White RD to contact patient for nutrition support. Patient voiced understanding and appreciation.     Plan/Referral:   Nutrition referral    Thank you,  Jonah Robb LCSW

## 2020-08-17 ENCOUNTER — OFFICE VISIT (OUTPATIENT)
Dept: ONCOLOGY | Age: 50
End: 2020-08-17
Payer: COMMERCIAL

## 2020-08-17 VITALS
BODY MASS INDEX: 24.45 KG/M2 | TEMPERATURE: 97.3 F | RESPIRATION RATE: 18 BRPM | DIASTOLIC BLOOD PRESSURE: 76 MMHG | HEART RATE: 85 BPM | OXYGEN SATURATION: 98 % | SYSTOLIC BLOOD PRESSURE: 114 MMHG | HEIGHT: 67 IN | WEIGHT: 155.8 LBS

## 2020-08-17 DIAGNOSIS — R10.31 RIGHT LOWER QUADRANT ABDOMINAL PAIN: ICD-10-CM

## 2020-08-17 DIAGNOSIS — F32.0 CURRENT MILD EPISODE OF MAJOR DEPRESSIVE DISORDER WITHOUT PRIOR EPISODE (HCC): ICD-10-CM

## 2020-08-17 DIAGNOSIS — L29.9 PRURITUS: ICD-10-CM

## 2020-08-17 DIAGNOSIS — C82.13 FOLLICULAR LYMPHOMA GRADE II OF INTRA-ABDOMINAL LYMPH NODES (HCC): Primary | ICD-10-CM

## 2020-08-17 PROCEDURE — 99214 OFFICE O/P EST MOD 30 MIN: CPT | Performed by: INTERNAL MEDICINE

## 2020-08-17 NOTE — PROGRESS NOTES
Moises Johnson is a 53 y.o. female follow up for lymphoma. 1. Have you been to the ER, urgent care clinic since your last visit? Hospitalized since your last visit?no    2. Have you seen or consulted any other health care providers outside of the [address] [place] [address] since your last visit? Include any pap smears or colon screening.  no

## 2020-10-01 ENCOUNTER — OFFICE VISIT (OUTPATIENT)
Dept: INTERNAL MEDICINE CLINIC | Age: 50
End: 2020-10-01
Payer: COMMERCIAL

## 2020-10-01 VITALS
RESPIRATION RATE: 16 BRPM | WEIGHT: 156 LBS | BODY MASS INDEX: 24.43 KG/M2 | TEMPERATURE: 98.5 F | HEART RATE: 69 BPM | SYSTOLIC BLOOD PRESSURE: 110 MMHG | OXYGEN SATURATION: 96 % | DIASTOLIC BLOOD PRESSURE: 70 MMHG

## 2020-10-01 DIAGNOSIS — Z00.00 ROUTINE GENERAL MEDICAL EXAMINATION AT A HEALTH CARE FACILITY: Primary | ICD-10-CM

## 2020-10-01 DIAGNOSIS — Z23 ENCOUNTER FOR IMMUNIZATION: ICD-10-CM

## 2020-10-01 DIAGNOSIS — Z23 NEEDS FLU SHOT: ICD-10-CM

## 2020-10-01 DIAGNOSIS — C83.30 DIFFUSE LARGE B-CELL LYMPHOMA, UNSPECIFIED BODY REGION (HCC): ICD-10-CM

## 2020-10-01 LAB
ALBUMIN SERPL-MCNC: 4.1 G/DL (ref 3.5–5)
ALBUMIN/GLOB SERPL: 1.5 {RATIO} (ref 1.1–2.2)
ALP SERPL-CCNC: 59 U/L (ref 45–117)
ALT SERPL-CCNC: 18 U/L (ref 12–78)
ANION GAP SERPL CALC-SCNC: 3 MMOL/L (ref 5–15)
AST SERPL-CCNC: 13 U/L (ref 15–37)
BASOPHILS # BLD: 0.1 K/UL (ref 0–0.1)
BASOPHILS NFR BLD: 1 % (ref 0–1)
BILIRUB SERPL-MCNC: 0.7 MG/DL (ref 0.2–1)
BUN SERPL-MCNC: 11 MG/DL (ref 6–20)
BUN/CREAT SERPL: 14 (ref 12–20)
CALCIUM SERPL-MCNC: 9 MG/DL (ref 8.5–10.1)
CHLORIDE SERPL-SCNC: 106 MMOL/L (ref 97–108)
CHOLEST SERPL-MCNC: 162 MG/DL
CO2 SERPL-SCNC: 28 MMOL/L (ref 21–32)
COMMENT, HOLDF: NORMAL
CREAT SERPL-MCNC: 0.78 MG/DL (ref 0.55–1.02)
DIFFERENTIAL METHOD BLD: NORMAL
EOSINOPHIL # BLD: 0.3 K/UL (ref 0–0.4)
EOSINOPHIL NFR BLD: 5 % (ref 0–7)
ERYTHROCYTE [DISTWIDTH] IN BLOOD BY AUTOMATED COUNT: 11.8 % (ref 11.5–14.5)
GLOBULIN SER CALC-MCNC: 2.8 G/DL (ref 2–4)
GLUCOSE SERPL-MCNC: 91 MG/DL (ref 65–100)
HCT VFR BLD AUTO: 41 % (ref 35–47)
HDLC SERPL-MCNC: 82 MG/DL
HDLC SERPL: 2 {RATIO} (ref 0–5)
HGB BLD-MCNC: 13.5 G/DL (ref 11.5–16)
IMM GRANULOCYTES # BLD AUTO: 0 K/UL (ref 0–0.04)
IMM GRANULOCYTES NFR BLD AUTO: 0 % (ref 0–0.5)
LDH SERPL L TO P-CCNC: 163 U/L (ref 81–246)
LDLC SERPL CALC-MCNC: 70 MG/DL (ref 0–100)
LIPID PROFILE,FLP: NORMAL
LYMPHOCYTES # BLD: 1.8 K/UL (ref 0.8–3.5)
LYMPHOCYTES NFR BLD: 26 % (ref 12–49)
MCH RBC QN AUTO: 32.5 PG (ref 26–34)
MCHC RBC AUTO-ENTMCNC: 32.9 G/DL (ref 30–36.5)
MCV RBC AUTO: 98.6 FL (ref 80–99)
MONOCYTES # BLD: 0.7 K/UL (ref 0–1)
MONOCYTES NFR BLD: 10 % (ref 5–13)
NEUTS SEG # BLD: 4 K/UL (ref 1.8–8)
NEUTS SEG NFR BLD: 58 % (ref 32–75)
NRBC # BLD: 0 K/UL (ref 0–0.01)
NRBC BLD-RTO: 0 PER 100 WBC
PLATELET # BLD AUTO: 336 K/UL (ref 150–400)
PMV BLD AUTO: 9.8 FL (ref 8.9–12.9)
POTASSIUM SERPL-SCNC: 4.4 MMOL/L (ref 3.5–5.1)
PROT SERPL-MCNC: 6.9 G/DL (ref 6.4–8.2)
RBC # BLD AUTO: 4.16 M/UL (ref 3.8–5.2)
SAMPLES BEING HELD,HOLD: NORMAL
SODIUM SERPL-SCNC: 137 MMOL/L (ref 136–145)
TRIGL SERPL-MCNC: 50 MG/DL (ref ?–150)
VLDLC SERPL CALC-MCNC: 10 MG/DL
WBC # BLD AUTO: 7 K/UL (ref 3.6–11)

## 2020-10-01 PROCEDURE — 90732 PPSV23 VACC 2 YRS+ SUBQ/IM: CPT

## 2020-10-01 PROCEDURE — 99396 PREV VISIT EST AGE 40-64: CPT | Performed by: INTERNAL MEDICINE

## 2020-10-01 PROCEDURE — 90686 IIV4 VACC NO PRSV 0.5 ML IM: CPT

## 2020-10-01 PROCEDURE — 90472 IMMUNIZATION ADMIN EACH ADD: CPT

## 2020-10-01 PROCEDURE — 90471 IMMUNIZATION ADMIN: CPT

## 2020-10-01 NOTE — PROGRESS NOTES
HISTORY OF PRESENT ILLNESS  King Carolin is a 53 y.o. female. HPI     She has been feeling fine and sees - she has been stable . She is not on treatment yet ; she had been having abdominal symptoms and bloating before and going to see a GI doctor   She is feeling well and work is good ; mood has been better     Review of Systems   All other systems reviewed and are negative. Physical Exam  Vitals signs and nursing note reviewed. Constitutional:       Appearance: Normal appearance. She is well-developed. HENT:      Head: Normocephalic and atraumatic. Right Ear: External ear normal.      Left Ear: External ear normal.      Nose: Nose normal.   Eyes:      Conjunctiva/sclera: Conjunctivae normal.      Pupils: Pupils are equal, round, and reactive to light. Neck:      Musculoskeletal: Normal range of motion and neck supple. Thyroid: No thyromegaly. Vascular: No carotid bruit. Cardiovascular:      Rate and Rhythm: Normal rate and regular rhythm. Heart sounds: Normal heart sounds, S1 normal and S2 normal.   Pulmonary:      Effort: Pulmonary effort is normal.      Breath sounds: Normal breath sounds. Abdominal:      General: Bowel sounds are normal.      Palpations: Abdomen is soft. Tenderness: There is no abdominal tenderness. Musculoskeletal: Normal range of motion. Skin:     General: Skin is warm and dry. Neurological:      Mental Status: She is alert and oriented to person, place, and time. Psychiatric:         Behavior: Behavior normal.         Thought Content: Thought content normal.         Judgment: Judgment normal.         ASSESSMENT and PLAN  Diagnoses and all orders for this visit:    1. Routine general medical examination at a health care facility-cont to work on exercise and eating right   -     LIPID PANEL; Future    2. Diffuse large B-cell lymphoma, unspecified body region [facility])- followed by    -     CBC WITH AUTOMATED DIFF;  Future  - METABOLIC PANEL, COMPREHENSIVE; Future  -     LD; Future    3. Needs flu shot  -     INFLUENZA VIRUS VAC QUAD,SPLIT,PRESV FREE SYRINGE IM    4.  Encounter for immunization  -     PNEUMOCOCCAL POLYSACCHARIDE VACCINE, 23-VALENT, ADULT OR IMMUNOSUPPRESSED PT DOSE,      lab results and schedule of future lab studies reviewed with patient  reviewed diet, exercise and weight control  cardiovascular risk and specific lipid/LDL goals reviewed  reviewed medications and side effects in detail

## 2020-10-28 ENCOUNTER — TELEPHONE (OUTPATIENT)
Dept: ONCOLOGY | Age: 50
End: 2020-10-28

## 2020-10-28 NOTE — TELEPHONE ENCOUNTER
LVM about appt time and date. Did see a lab order- ask her to leave a message if she needed the order again to get labs done prior to Appt. Imaging were order and left phone number to call to get schedule.

## 2020-10-29 ENCOUNTER — TELEPHONE (OUTPATIENT)
Dept: ONCOLOGY | Age: 50
End: 2020-10-29

## 2020-10-29 NOTE — TELEPHONE ENCOUNTER
[employer] at [facility]  [phone]      10/29/20 3:57 PM Spoke with Isaias Vega with [address] [place] [address]. She verified that existing CT order is valid. Isaias Vega will contact patient to proceed with scheduling. No further questions or concerns at this time.

## 2020-10-29 NOTE — TELEPHONE ENCOUNTER
Pt tried getting CT Scan ordered ---scheduling telling her no order in system     --its there    Please advise can we do something to help patient thanks

## 2020-11-06 DIAGNOSIS — C82.13 FOLLICULAR LYMPHOMA GRADE II OF INTRA-ABDOMINAL LYMPH NODES (HCC): ICD-10-CM

## 2020-11-06 DIAGNOSIS — R10.31 RIGHT LOWER QUADRANT ABDOMINAL PAIN: ICD-10-CM

## 2020-11-06 LAB
ALBUMIN SERPL-MCNC: 4.1 G/DL (ref 3.5–5)
ALBUMIN/GLOB SERPL: 1.3 {RATIO} (ref 1.1–2.2)
ALP SERPL-CCNC: 70 U/L (ref 45–117)
ALT SERPL-CCNC: 19 U/L (ref 12–78)
ANION GAP SERPL CALC-SCNC: 4 MMOL/L (ref 5–15)
AST SERPL-CCNC: 17 U/L (ref 15–37)
BASOPHILS # BLD: 0.1 K/UL (ref 0–0.1)
BASOPHILS NFR BLD: 2 % (ref 0–1)
BILIRUB SERPL-MCNC: 0.5 MG/DL (ref 0.2–1)
BUN SERPL-MCNC: 12 MG/DL (ref 6–20)
BUN/CREAT SERPL: 14 (ref 12–20)
CALCIUM SERPL-MCNC: 9.2 MG/DL (ref 8.5–10.1)
CHLORIDE SERPL-SCNC: 105 MMOL/L (ref 97–108)
CO2 SERPL-SCNC: 30 MMOL/L (ref 21–32)
CREAT SERPL-MCNC: 0.83 MG/DL (ref 0.55–1.02)
DIFFERENTIAL METHOD BLD: ABNORMAL
EOSINOPHIL # BLD: 0.5 K/UL (ref 0–0.4)
EOSINOPHIL NFR BLD: 9 % (ref 0–7)
ERYTHROCYTE [DISTWIDTH] IN BLOOD BY AUTOMATED COUNT: 11.8 % (ref 11.5–14.5)
GLOBULIN SER CALC-MCNC: 3.1 G/DL (ref 2–4)
GLUCOSE SERPL-MCNC: 106 MG/DL (ref 65–100)
HCT VFR BLD AUTO: 42.9 % (ref 35–47)
HGB BLD-MCNC: 14.1 G/DL (ref 11.5–16)
IMM GRANULOCYTES # BLD AUTO: 0 K/UL (ref 0–0.04)
IMM GRANULOCYTES NFR BLD AUTO: 0 % (ref 0–0.5)
LDH SERPL L TO P-CCNC: 140 U/L (ref 81–246)
LYMPHOCYTES # BLD: 1.8 K/UL (ref 0.8–3.5)
LYMPHOCYTES NFR BLD: 31 % (ref 12–49)
MCH RBC QN AUTO: 32.2 PG (ref 26–34)
MCHC RBC AUTO-ENTMCNC: 32.9 G/DL (ref 30–36.5)
MCV RBC AUTO: 97.9 FL (ref 80–99)
MONOCYTES # BLD: 0.5 K/UL (ref 0–1)
MONOCYTES NFR BLD: 9 % (ref 5–13)
NEUTS SEG # BLD: 2.9 K/UL (ref 1.8–8)
NEUTS SEG NFR BLD: 49 % (ref 32–75)
NRBC # BLD: 0 K/UL (ref 0–0.01)
NRBC BLD-RTO: 0 PER 100 WBC
PLATELET # BLD AUTO: 345 K/UL (ref 150–400)
PMV BLD AUTO: 9.1 FL (ref 8.9–12.9)
POTASSIUM SERPL-SCNC: 4.4 MMOL/L (ref 3.5–5.1)
PROT SERPL-MCNC: 7.2 G/DL (ref 6.4–8.2)
RBC # BLD AUTO: 4.38 M/UL (ref 3.8–5.2)
SODIUM SERPL-SCNC: 139 MMOL/L (ref 136–145)
WBC # BLD AUTO: 5.9 K/UL (ref 3.6–11)

## 2020-11-09 ENCOUNTER — HOSPITAL ENCOUNTER (OUTPATIENT)
Dept: CT IMAGING | Age: 50
Discharge: HOME | End: 2020-11-09
Attending: INTERNAL MEDICINE
Payer: COMMERCIAL

## 2020-11-09 DIAGNOSIS — C82.13 FOLLICULAR LYMPHOMA GRADE II OF INTRA-ABDOMINAL LYMPH NODES (HCC): ICD-10-CM

## 2020-11-09 PROCEDURE — 74011000636 HC RX REV CODE- 636: Performed by: INTERNAL MEDICINE

## 2020-11-09 PROCEDURE — 74177 CT ABD & PELVIS W/CONTRAST: CPT

## 2020-11-09 RX ADMIN — IOPAMIDOL 100 ML: 755 INJECTION, SOLUTION INTRAVENOUS at 13:26

## 2020-11-11 ENCOUNTER — HOSPITAL ENCOUNTER (OUTPATIENT)
Dept: MAMMOGRAPHY | Age: 50
Discharge: HOME | End: 2020-11-11
Attending: OBSTETRICS & GYNECOLOGY
Payer: COMMERCIAL

## 2020-11-11 DIAGNOSIS — Z12.31 VISIT FOR SCREENING MAMMOGRAM: ICD-10-CM

## 2020-11-11 PROCEDURE — 77063 BREAST TOMOSYNTHESIS BI: CPT

## 2020-11-11 NOTE — PROGRESS NOTES
[id number] [facility] at [address] [place] [address]  [phone]    Hematology / Oncology Established Visit    Reason for Visit:   Yue Bauer is a 53 y.o. female who is seen for follow up of lymphoma. PCP is Dr. Debbie Yee. Hematology Oncology Treatment History:     Diagnosis: Follicular lymphoma    Stage: III; FLIPI2 low risk    Pathology:   -1/20/20 Right inguinal LN excision: Follicular lymphoma, grade 1-2, follicular pattern, JH21 positive. Comment:   The LN specimen demonstrates increased follicles, lacking normal germinal centers. IHC markers are performed with good controls. Tumor cells are positive for CD20 and BCL2. CD3 marks background T cells. Cyclin D1 is negative in the lymphoid population. CD21 highlights intact dendritic meshworks. Flow cytometry shows positive expression of CD10 and kappa LC restriction. Ki-67 is pending. Focally there are a few follicles with greater than fifteen centroblasts per HPF; however the majority of follicles have less than 15 centroblasts/HPF, consistent with grade 1-2. Diffuse areas are not seen.    -2/5/20 Bone marrow biopsy: Normocellular marrow with multilineage hematopoiesis and <1% involvement by a CD10 positive B-cell lymphoproliferative disorder   Negative for immunophenotypic or morphologic evidence of dysplasia no increase in blasts. Comment   Flow cytometric analysis reveals a small population of CD10 positive clonal B cells compatible with involvement by the patient's recently diagnosed follicular lymphoma. FISH studies are pending and will be reported. Prior Treatment: None    Current Treatment: Observation    History of Present Illness:   Yue Bauer is a 53 y.o. female who comes in for evaluation of Follicular lymphoma. She states she has had vague symptoms for several years as follows. Pt reports h/o pruritus which started 6 yrs ago, which finally subsided with Zyrtec.  She saw a hematologist at that time, who told her symptoms might surface as a blood disorder in the future. In 2016, pt went to see her Gyn (Dr. Trudy Jordan) for abdominal cramping. Workup normal at time time. In summer of 2019, pt developed fatigue as well as same of the chronic pain in abdominal area. In fall of 2019, she developed memory issues with work. She went to see her Psychiatrist due to these symptoms and switched her from generic Wellbutrin to brand name Wellbutrin. No improvement after 3-4 weeks. She was okay with sleeping during that time, but continued to have severe fatigue out of character for her. She went to see her PCP and described a right inguinal LN enlargement. She went to see a surgeon, Dr. Marylin Koehler. Ultrasound of that region did demonstrate an enlarged LN. FNA was inconclusive. Core needle biopsy was suspicious for B cell lymphoma. Excisional LN biopsy 0/54/53 revealed follicular lymphoma. CT of chest/abd/pelvis was notable for retroperitoneal, pelvic and portacaval LAD. Pt also reports left hip pain, left foot tingling. No fevers, chills, weight loss. She does have occasional sweats more notable a few months ago, but not drenching or nightly. Patient underwent staging bone marrow biopsy and PET scan. She did see [facility] for a 2nd opinion. She was told her GI symptoms are not related to lymphoma. Interval History:  She is seen for f/u of lymphoma. She states she has been feeling terrible at times. Has been feeling mild nausea, loss of appetite. Feels like an elephant is sitting on her chest. Has a little bit of intermittent diarrhea, but has lots of constipation. She had an EGD, colonoscopy by Dr. Carly Peterson recently. Has not heard back yet about biopsy results. Also had abdominal ultrasound. Despite sleeping well, she feels very fatigued. Even fell asleep during a work meeting recently.     Past Medical History:   Diagnosis Date    Chronic pain     Depression 2/10/2009    Fibrocystic breast 2/10/2009      Past Surgical History:   Procedure Laterality Date    HX BREAST BIOPSY Bilateral 20 + yrs ago    neg    HX HEENT      wisdom teeth extraction      Social History     Tobacco Use    Smoking status: Former Smoker     Last attempt to quit: 2000     Years since quittin.8    Smokeless tobacco: Never Used   Substance Use Topics    Alcohol use: Yes     Alcohol/week: 4.2 standard drinks     Types: 5 Glasses of wine per week     Comment: occassionally      Family History   Problem Relation Age of Onset    Cancer Mother         breast-63    Breast Cancer Mother 61    Stroke Father     Diabetes Neg Hx     Heart Disease Neg Hx     Hypertension Neg Hx      Current Outpatient Medications   Medication Sig    FLUoxetine (PROZAC) 40 mg capsule     buPROPion XL (WELLBUTRIN XL) 150 mg tablet 150 mg every morning.  VYVANSE 30 mg capsule      No current facility-administered medications for this visit. No Known Allergies     Review of Systems: A complete review of systems was obtained, negative except as described above. Physical Exam:     Visit Vitals  /79 (BP 1 Location: Right arm, BP Patient Position: Sitting)   Pulse 79   Temp 97.5 °F (36.4 °C) (Temporal)   Resp 16   Ht 5' 7\" (1.702 m)   Wt 160 lb 9.6 oz (72.8 kg)   SpO2 98%   BMI 25.15 kg/m²     ECOG PS: 0  General: Well developed, no acute distress  Eyes: PERRLA, EOMI, anicteric sclerae  HENT: Atraumatic, OP clear, TMs intact without erythema  Neck: Supple  Lymphatic: No cervical, supraclavicular, axillary or inguinal adenopathy  Respiratory: CTAB, normal respiratory effort  CV: Normal rate, regular rhythm, no murmurs, no peripheral edema  GI: Soft, nontender, nondistended, no masses, no hepatomegaly, no splenomegaly  MS: Normal gait and station. Digits without clubbing or cyanosis. Skin: No rashes, ecchymoses, or petechiae. Normal temperature, turgor, and texture. Neuro/Psych: Alert, oriented. 5/5 strength in all 4 extremities.  Appropriate affect, normal judgment/insight. Results:     Lab Results   Component Value Date/Time    WBC 5.9 11/06/2020 08:05 AM    HGB 14.1 11/06/2020 08:05 AM    HCT 42.9 11/06/2020 08:05 AM    PLATELET 647 17/14/6962 08:05 AM    MCV 97.9 11/06/2020 08:05 AM    ABS. NEUTROPHILS 2.9 11/06/2020 08:05 AM     Lab Results   Component Value Date/Time    Sodium 139 11/06/2020 08:05 AM    Potassium 4.4 11/06/2020 08:05 AM    Chloride 105 11/06/2020 08:05 AM    CO2 30 11/06/2020 08:05 AM    Glucose 106 (H) 11/06/2020 08:05 AM    BUN 12 11/06/2020 08:05 AM    Creatinine 0.83 11/06/2020 08:05 AM    GFR est AA >60 11/06/2020 08:05 AM    GFR est non-AA >60 11/06/2020 08:05 AM    Calcium 9.2 11/06/2020 08:05 AM     Lab Results   Component Value Date/Time    Bilirubin, total 0.5 11/06/2020 08:05 AM    ALT (SGPT) 19 11/06/2020 08:05 AM    Alk. phosphatase 70 11/06/2020 08:05 AM    Protein, total 7.2 11/06/2020 08:05 AM    Albumin 4.1 11/06/2020 08:05 AM    Globulin 3.1 11/06/2020 08:05 AM     No results found for: IRON, FE, TIBC, IBCT, PSAT, FERR    No results found for: B12LT, FOL, RBCF  Lab Results   Component Value Date/Time    TSH 1.830 09/25/2019 12:45 PM     Lab Results   Component Value Date/Time    Hepatitis B surface Ab <3.10 01/24/2020 08:13 AM         Imaging:     Chest CT  1/24/20:  FINDINGS:  The normal-sized axillary lymph nodes are unchanged. THYROID: No nodule. MEDIASTINUM: No mass or lymphadenopathy. MARCE: No mass or lymphadenopathy. THORACIC AORTA: No aneurysm. MAIN PULMONARY ARTERY: Normal in caliber. TRACHEA/BRONCHI: Patent. ESOPHAGUS: No wall thickening or dilatation. HEART: Normal in size. PLEURA: No effusion or pneumothorax. LUNGS: Laterally in the left lower lobe on image 53 is a 2 to 3 mm pulmonary  nodule which is unchanged when compared with the examination of 6/27/2017. This  is a benign nodule and not significant. .  There are normal size lymph nodes adjacent to the descending thoracic aorta and  paraspinal region which have increased in size but remain normal in size. .  Abdomen and pelvis: There has been interval development of adenopathy. In the portacaval space there  is a lymph node that measures 17 mm in short axis. There are retroperitoneal  lymph nodes that are enlarged as well. The largest lymph node in the  retroperitoneum is posterior to the inferior vena cava beginning at the level of  the right renal artery this extends inferiorly. This measures 2.4 x 1.0 x 4.0  cm. There are also enlarged lymph nodes in the left aortic region largest  measuring 12 mm in short axis. There are also lymph nodes posterior to the head of the pancreas and anterior to  the inferior vena cava which are enlarged the largest measures 10 mm in short  axis  There are enlarged lymph nodes in the interaortocaval space. Enlarged lymph  nodes along the right common iliac artery largest measuring approximately 12 mm  in short axis. There are enlarged lymph nodes in the right external iliac chain  largest measuring 9 mm. There is an enlarged lymph node along the right pelvic sidewall measuring 2.5 x  1.3 cm. There are postsurgical changes in the right inguinal region with one  tiny locule of air. There are residual lymph nodes in the right inguinal largest  measuring 10 mm. .  The uterus images normally. There is a 1 cm cyst in the right ovary. There is no free fluid. Review of the bone windows reveals no destructive lesions. The liver and gallbladder are unremarkable. The spleen is normal in size and  configuration. The pancreas and adrenal glands and kidneys are unremarkable. The aorta and inferior vena cava are unremarkable. IMPRESSION:  1. There is is adenopathy as described. There is adenopathy in the right  inguinal region, along the right pelvic sidewall, along the right iliac chain,  bilateral retroperitoneum, and in the portacaval space as well as posterior to  the pancreas head.   2. In the posterior mediastinum adjacent to the descending thoracic aorta are  lymph nodes that measure less than 1 cm in size but these have increased in the  interval.  3. Please see above text    PET 2/10/20:   FINDINGS:  HEAD/NECK: No apparent foci of abnormal hypermetabolism. Cerebral evaluation is  limited by normal intense activity. CHEST: Mildly hypermetabolic left supraclavicular lymph node, maximum SUV 4.2. ABDOMEN/PELVIS: Mildly hypermetabolic portacaval, aortocaval, and left  para-aortic lymph nodes are noted, maximum SUV 5.5 of an aortocaval lymph node. Hypermetabolic right common iliac, right external iliac, right obturator, and  right inguinal lymph nodes.   SKELETON: No foci of abnormal hypermetabolism in the axial and visualized  appendicular skeleton. IMPRESSION: Mildly hypermetabolic left supraclavicular, abdominal,  retroperitoneal, and pelvic lymph nodes. Ch/abd/pelvis CT 5/7/20:  Lymph nodes in chest: There is no new mediastinal, hilar or axillary lymphadenopathy. Subcentimeter bilateral axillary lymph nodes are unchanged. Subcentimeter  posterior mediastinal lymph node abutting the descending thoracic aorta is  unchanged compared to prior CT dated January 2020. 1.3 cm x 1.1 cm left  supraclavicular lymph node is unchanged compared to prior CT dated January 2020. Lymph nodes in abdom/pelvis: There is a prominent portacaval lymph node measuring approximately  1.6 cm x 2.3 cm, unchanged compared to prior CT dated January 2020. There are multiple prominent and mildly enlarged retroperitoneal lymph nodes, unchanged  compared to prior CT dated January 2020. For example, there is a left  para-aortic retroperitoneal lymph node measures 1.2 cm x 1.6 cm, unchanged  compared to prior CT dated January 2020. As an additional example, there is a 9  mm x 1.3 cm aortocaval lymph node, unchanged compared to prior CT dated January 2020. Right iliac lymph nodes are unchanged compared to prior CT dated January 2020. For example, the largest right iliac lymph node measures 1.1 cm x 1.4 cm,  unchanged compared to prior CT dated January 2020. Right internal obturator  lymph node is unchanged compared to prior CT dated January 2020) measuring  approximately 2.8 cm x 1.4 cm. Prominent right inguinal lymph nodes are not  significantly changed in size. For example, there is a 1 cm x 1.4 cm right  inguinal lymph node, unchanged compared to prior CT dated January 2020. IMPRESSION: No interval change. Discussed with radiology - the right RP LN near R renal artery is unchanged. The 4cm dimension is craniocaudal.    11/9/20 CT c/a/p:  FINDINGS:   LYMPH NODES: There is a prominent portacaval lymph node measuring approximately  1.8 x 2.0 cm, not significantly changed from prior measurements of 1.6 cm x 2.3  cm. There are  multiple prominent and mildly enlarged retroperitoneal lymph nodes, not  significantly changed from prior. For example, there is a left para-aortic  retroperitoneal lymph node that measures 1.1 x 1.7 cm, not significantly changed  from prior measurements of 1.2 x 1.6 and a 10 x 14 mm aortocaval lymph node, not  significantly changed from prior measurements of9 x 13 mm. Right iliac lymph  nodes are little changed, though the largest has slightly increased in size  measuring 1.3 x 1.6 cm, previously 1.1 x 1.4 cm. Right internal obturator is not  significantly changed measuring 1.1 x 2.7 cm, previously 1.3 x 3.0 cm. Prominent  right inguinal lymph nodes are not significantly changed in size measuring 1.0 x  1.4 cm. IMPRESSION: No significant interval change apart from a single right common  iliac pelvic lymph nodes which shows slight increase in size by measurement from  1.1 x 1.4 cm to 1.3 x 1.6 cm. Assessment & Plan:   Victoria Walker is a 53 y.o. female with Depression comes in for evaluation and management of Follicular lymphoma. 1. Follicular lymphoma: Stage III (based on L supraclav and abd/pelvis LAD). Normal LDH and no cytopenias or B symptoms present. Discussed that this is an indolent lymphoma which does not necessarily recommend treatment at time of diagnosis. Patients are observed with H&P and labs every 3-6 months. Treatment is for those who develop B symptoms, cytopenias or bulky disease (LN >7cm). At this time, I am unclear whether patient's symptoms are related to the disease. Furthermore, there are no \"bulky\" LNs > 7cm, but there is a 4cm LN in the right retroperitoneal LN region which if it increased significantly in size could risk of compression of renal vasculature. Bone marrow biopsy shows < 1% monoclonal CD10 positive B cell population (seen on flow cytometry), which is considered negative for BM involvement. PET shows only mildly hypermetabolic uptake with max SUV of 5.5. Treatment options for Stage III, grade 1/2 FL include observation vs chemoimmunotherapy with BR regimen vs single agent Rituxan. I recommend continued observation at this time. Repeat CT 11/2020 shows increase in single right iliac pelvic node but overall no significant change compared to CT in 5/2020. If the abdominal pain worsens in the future, single agent Rituximab is a reasonable option to see if shrinkage of the LN helps the abdominal pain. -- Continue observation - will advise patient on more aggressive management of constipation. -- Repeat CT of ch/abd/pelvis due in 5/2021.   -- Return in 3 months with labs and MD follow up    2. Depression / ADHD: On Buproprion, Fluoxetine, Vyvanse. 3. Abdominal and back pain: Improved compared to prior although size of LNs has not changed. Unclear etiology and likely unrelated to the lymphoma. I wonder if these symptoms are due to her being in the shon-menopausal state causing GI upset. Could also be due to other factors such as diet, stress. Reviewed healthy options. 4. Intermittent pruritus: Unclear etiology and may be related to allergic rhinitis.  However, pt had severe episode of pruritus several years ago. 5. Constipation: Not currently on any medications. -- Docusate 100mg bid and start Metamucil every other day. Emotional well being: Pt is coping well with his/her disease and has excellent support. I appreciate the opportunity to participate in Ms. Corbin Schilder care.     Signed By: Jabari Irwin MD     November 16, 2020

## 2020-11-16 ENCOUNTER — OFFICE VISIT (OUTPATIENT)
Dept: ONCOLOGY | Age: 50
End: 2020-11-16
Payer: COMMERCIAL

## 2020-11-16 VITALS
DIASTOLIC BLOOD PRESSURE: 79 MMHG | RESPIRATION RATE: 16 BRPM | HEART RATE: 79 BPM | HEIGHT: 67 IN | BODY MASS INDEX: 25.21 KG/M2 | WEIGHT: 160.6 LBS | SYSTOLIC BLOOD PRESSURE: 131 MMHG | OXYGEN SATURATION: 98 % | TEMPERATURE: 97.5 F

## 2020-11-16 DIAGNOSIS — R53.82 CHRONIC FATIGUE: ICD-10-CM

## 2020-11-16 DIAGNOSIS — K59.00 CONSTIPATION, UNSPECIFIED CONSTIPATION TYPE: ICD-10-CM

## 2020-11-16 DIAGNOSIS — R10.84 GENERALIZED ABDOMINAL PAIN: ICD-10-CM

## 2020-11-16 DIAGNOSIS — C82.13 FOLLICULAR LYMPHOMA GRADE II OF INTRA-ABDOMINAL LYMPH NODES (HCC): Primary | ICD-10-CM

## 2020-11-16 DIAGNOSIS — C82.13 FOLLICULAR LYMPHOMA GRADE II OF INTRA-ABDOMINAL LYMPH NODES (HCC): ICD-10-CM

## 2020-11-16 PROCEDURE — 99214 OFFICE O/P EST MOD 30 MIN: CPT | Performed by: INTERNAL MEDICINE

## 2020-11-16 RX ORDER — DOCUSATE SODIUM 100 MG/1
100 CAPSULE, LIQUID FILLED ORAL 2 TIMES DAILY
Qty: 60 CAP | Refills: 2 | Status: SHIPPED | OUTPATIENT
Start: 2020-11-16 | End: 2021-02-14

## 2020-11-16 RX ORDER — PANTOPRAZOLE SODIUM 40 MG/1
TABLET, DELAYED RELEASE ORAL
COMMUNITY
Start: 2020-11-12 | End: 2021-03-29 | Stop reason: ALTCHOICE

## 2020-11-16 NOTE — PATIENT INSTRUCTIONS
Constipation: 
 
Start taking Docusate 100mg twice a day Also start taking Metamucil (in a large glass of water) every other morning. Goal is for daily bowel movements without watery diarrhea. If no improvement after 2 weeks, add Senna (over the counter laxative) once daily. If no improvement after another 2 weeks, please call my office.

## 2020-11-16 NOTE — PROGRESS NOTES
Chief Complaint   Patient presents with    Follow-up     Ifrah Cote is a pleasant 53year old woman who presents today as a follow up for lymphoma.  She reports abdominal pain at a level 5 today

## 2020-11-30 ENCOUNTER — TRANSCRIBE ORDER (OUTPATIENT)
Dept: SCHEDULING | Age: 50
End: 2020-11-30

## 2020-11-30 DIAGNOSIS — R10.84 ABDOMINAL PAIN, GENERALIZED: Primary | ICD-10-CM

## 2021-02-19 ENCOUNTER — TELEPHONE (OUTPATIENT)
Dept: ONCOLOGY | Age: 51
End: 2021-02-19

## 2021-02-24 ENCOUNTER — TELEPHONE (OUTPATIENT)
Dept: ONCOLOGY | Age: 51
End: 2021-02-24

## 2021-03-22 LAB
ALBUMIN SERPL-MCNC: 4.5 G/DL (ref 3.5–5)
ALBUMIN/GLOB SERPL: 1.5 {RATIO} (ref 1.1–2.2)
ALP SERPL-CCNC: 95 U/L (ref 45–117)
ALT SERPL-CCNC: 24 U/L (ref 12–78)
ANION GAP SERPL CALC-SCNC: 4 MMOL/L (ref 5–15)
AST SERPL-CCNC: 18 U/L (ref 15–37)
BASOPHILS # BLD: 0.1 K/UL (ref 0–0.1)
BASOPHILS NFR BLD: 1 % (ref 0–1)
BILIRUB SERPL-MCNC: 0.6 MG/DL (ref 0.2–1)
BUN SERPL-MCNC: 12 MG/DL (ref 6–20)
BUN/CREAT SERPL: 19 (ref 12–20)
CALCIUM SERPL-MCNC: 9.4 MG/DL (ref 8.5–10.1)
CHLORIDE SERPL-SCNC: 105 MMOL/L (ref 97–108)
CO2 SERPL-SCNC: 28 MMOL/L (ref 21–32)
CREAT SERPL-MCNC: 0.62 MG/DL (ref 0.55–1.02)
DIFFERENTIAL METHOD BLD: NORMAL
EOSINOPHIL # BLD: 0.2 K/UL (ref 0–0.4)
EOSINOPHIL NFR BLD: 3 % (ref 0–7)
ERYTHROCYTE [DISTWIDTH] IN BLOOD BY AUTOMATED COUNT: 11.6 % (ref 11.5–14.5)
GLOBULIN SER CALC-MCNC: 3 G/DL (ref 2–4)
GLUCOSE SERPL-MCNC: 98 MG/DL (ref 65–100)
HCT VFR BLD AUTO: 43 % (ref 35–47)
HGB BLD-MCNC: 14.2 G/DL (ref 11.5–16)
IMM GRANULOCYTES # BLD AUTO: 0 K/UL (ref 0–0.04)
IMM GRANULOCYTES NFR BLD AUTO: 0 % (ref 0–0.5)
LDH SERPL L TO P-CCNC: 177 U/L (ref 81–246)
LYMPHOCYTES # BLD: 1.8 K/UL (ref 0.8–3.5)
LYMPHOCYTES NFR BLD: 30 % (ref 12–49)
MCH RBC QN AUTO: 32.3 PG (ref 26–34)
MCHC RBC AUTO-ENTMCNC: 33 G/DL (ref 30–36.5)
MCV RBC AUTO: 97.9 FL (ref 80–99)
MONOCYTES # BLD: 0.5 K/UL (ref 0–1)
MONOCYTES NFR BLD: 9 % (ref 5–13)
NEUTS SEG # BLD: 3.3 K/UL (ref 1.8–8)
NEUTS SEG NFR BLD: 57 % (ref 32–75)
NRBC # BLD: 0 K/UL (ref 0–0.01)
NRBC BLD-RTO: 0 PER 100 WBC
PLATELET # BLD AUTO: 330 K/UL (ref 150–400)
PMV BLD AUTO: 9.5 FL (ref 8.9–12.9)
POTASSIUM SERPL-SCNC: 3.9 MMOL/L (ref 3.5–5.1)
PROT SERPL-MCNC: 7.5 G/DL (ref 6.4–8.2)
RBC # BLD AUTO: 4.39 M/UL (ref 3.8–5.2)
SODIUM SERPL-SCNC: 137 MMOL/L (ref 136–145)
WBC # BLD AUTO: 5.8 K/UL (ref 3.6–11)

## 2021-03-23 NOTE — PROGRESS NOTES
[id number] [facility] at [facility]  [phone]    Hematology / Oncology Established Visit    Reason for Visit:   Imtiaz Hall is a 49 y.o. female who is seen for follow up of lymphoma. PCP is Dr. Leela Glez. Hematology Oncology Treatment History:     Diagnosis: Follicular lymphoma    Stage: III; FLIPI2 low risk    Pathology:   -1/20/20 Right inguinal LN excision: Follicular lymphoma, grade 1-2, follicular pattern, ZN79 positive. Comment:   The LN specimen demonstrates increased follicles, lacking normal germinal centers. IHC markers are performed with good controls. Tumor cells are positive for CD20 and BCL2. CD3 marks background T cells. Cyclin D1 is negative in the lymphoid population. CD21 highlights intact dendritic meshworks. Flow cytometry shows positive expression of CD10 and kappa LC restriction. Ki-67 is pending. Focally there are a few follicles with greater than fifteen centroblasts per HPF; however the majority of follicles have less than 15 centroblasts/HPF, consistent with grade 1-2. Diffuse areas are not seen.    -2/5/20 Bone marrow biopsy: Normocellular marrow with multilineage hematopoiesis and <1% involvement by a CD10 positive B-cell lymphoproliferative disorder   Negative for immunophenotypic or morphologic evidence of dysplasia no increase in blasts. Comment   Flow cytometric analysis reveals a small population of CD10 positive clonal B cells compatible with involvement by the patient's recently diagnosed follicular lymphoma. FISH studies are pending and will be reported. Prior Treatment: None    Current Treatment: Observation    History of Present Illness:   Imtiaz Hall is a 49 y.o. female who comes in for evaluation of Follicular lymphoma. She states she has had vague symptoms for several years as follows. Pt reports h/o pruritus which started 6 yrs ago, which finally subsided with Zyrtec.  She saw a hematologist at that time, who told her symptoms might surface as a blood disorder in the future. In 2016, pt went to see her Gyn (Dr. Gilbert Yanez) for abdominal cramping. Workup normal at time time. In summer of 2019, pt developed fatigue as well as same of the chronic pain in abdominal area. In fall of 2019, she developed memory issues with work. She went to see her Psychiatrist due to these symptoms and switched her from generic Wellbutrin to brand name Wellbutrin. No improvement after 3-4 weeks. She was okay with sleeping during that time, but continued to have severe fatigue out of character for her. She went to see her PCP and described a right inguinal LN enlargement. She went to see a surgeon, Dr. Pascale Frost. Ultrasound of that region did demonstrate an enlarged LN. FNA was inconclusive. Core needle biopsy was suspicious for B cell lymphoma. Excisional LN biopsy 7/70/85 revealed follicular lymphoma. CT of chest/abd/pelvis was notable for retroperitoneal, pelvic and portacaval LAD. Pt also reports left hip pain, left foot tingling. No fevers, chills, weight loss. She does have occasional sweats more notable a few months ago, but not drenching or nightly. Patient underwent staging bone marrow biopsy and PET scan. She did see [facility] for a 2nd opinion. She was told her GI symptoms are not related to lymphoma. Interval History:  She is seen for f/u of lymphoma. Underwent cholecystectomy 12/2020. She noted a major improvement in her abdominal pain, bloating, nausea. She does have an occasional left sided sharp paint that comes and goes. She remains very constipated. She tried colace which helped somewhat, but she did not take regularly. She asks about Colace, Miralax etc. Overall, she is doing much better than prior.        Past Medical History:   Diagnosis Date    Chronic pain     Depression 2/10/2009    Fibrocystic breast 2/10/2009      Past Surgical History:   Procedure Laterality Date    HX BREAST BIOPSY Bilateral 20 + yrs ago    neg    HX HEENT      wisdom teeth extraction      Social History     Tobacco Use    Smoking status: Former Smoker     Quit date: 2000     Years since quittin.1    Smokeless tobacco: Never Used   Substance Use Topics    Alcohol use: Yes     Alcohol/week: 4.2 standard drinks     Types: 5 Glasses of wine per week     Comment: occassionally      Family History   Problem Relation Age of Onset    Cancer Mother         breast-63    Breast Cancer Mother 61    Stroke Father     Diabetes Neg Hx     Heart Disease Neg Hx     Hypertension Neg Hx      Current Outpatient Medications   Medication Sig    pantoprazole (PROTONIX) 40 mg tablet     FLUoxetine (PROZAC) 40 mg capsule     buPROPion XL (WELLBUTRIN XL) 150 mg tablet 150 mg every morning.  VYVANSE 30 mg capsule      No current facility-administered medications for this visit. No Known Allergies     Review of Systems: A complete review of systems was obtained, negative except as described above. Physical Exam:     Visit Vitals  BP (!) 142/81 (BP 1 Location: Right arm, BP Patient Position: Sitting)   Pulse 75   Temp 98.1 °F (36.7 °C) (Oral)   Ht 5' 7\" (1.702 m)   Wt 157 lb 12.8 oz (71.6 kg)   BMI 24.71 kg/m²     ECOG PS: 0  General: Well developed, no acute distress  Eyes: PERRLA, EOMI, anicteric sclerae  HENT: Atraumatic, OP clear, TMs intact without erythema  Neck: Supple  Lymphatic: No cervical, supraclavicular, axillary or inguinal adenopathy  Respiratory: CTAB, normal respiratory effort  CV: Normal rate, regular rhythm, no murmurs, no peripheral edema  GI: Soft, nontender, nondistended, no masses, no hepatomegaly, no splenomegaly  MS: Normal gait and station. Digits without clubbing or cyanosis. Skin: No rashes, ecchymoses, or petechiae. Normal temperature, turgor, and texture. Neuro/Psych: Alert, oriented. 5/5 strength in all 4 extremities. Appropriate affect, normal judgment/insight.       Results:     Lab Results   Component Value Date/Time    WBC 5.8 03/22/2021 02:02 PM    HGB 14.2 03/22/2021 02:02 PM    HCT 43.0 03/22/2021 02:02 PM    PLATELET 915 29/92/3487 02:02 PM    MCV 97.9 03/22/2021 02:02 PM    ABS. NEUTROPHILS 3.3 03/22/2021 02:02 PM     Lab Results   Component Value Date/Time    Sodium 137 03/22/2021 02:02 PM    Potassium 3.9 03/22/2021 02:02 PM    Chloride 105 03/22/2021 02:02 PM    CO2 28 03/22/2021 02:02 PM    Glucose 98 03/22/2021 02:02 PM    BUN 12 03/22/2021 02:02 PM    Creatinine 0.62 03/22/2021 02:02 PM    GFR est AA >60 03/22/2021 02:02 PM    GFR est non-AA >60 03/22/2021 02:02 PM    Calcium 9.4 03/22/2021 02:02 PM     Lab Results   Component Value Date/Time    Bilirubin, total 0.6 03/22/2021 02:02 PM    ALT (SGPT) 24 03/22/2021 02:02 PM    Alk. phosphatase 95 03/22/2021 02:02 PM    Protein, total 7.5 03/22/2021 02:02 PM    Albumin 4.5 03/22/2021 02:02 PM    Globulin 3.0 03/22/2021 02:02 PM     No results found for: IRON, FE, TIBC, IBCT, PSAT, FERR    No results found for: B12LT, FOL, RBCF  Lab Results   Component Value Date/Time    TSH 1.830 09/25/2019 12:45 PM     Lab Results   Component Value Date/Time    Hepatitis B surface Ab <3.10 01/24/2020 08:13 AM         Imaging:     Chest CT  1/24/20:  FINDINGS:  The normal-sized axillary lymph nodes are unchanged. THYROID: No nodule. MEDIASTINUM: No mass or lymphadenopathy. MARCE: No mass or lymphadenopathy. THORACIC AORTA: No aneurysm. MAIN PULMONARY ARTERY: Normal in caliber. TRACHEA/BRONCHI: Patent. ESOPHAGUS: No wall thickening or dilatation. HEART: Normal in size. PLEURA: No effusion or pneumothorax. LUNGS: Laterally in the left lower lobe on image 53 is a 2 to 3 mm pulmonary  nodule which is unchanged when compared with the examination of 6/27/2017. This  is a benign nodule and not significant. .  There are normal size lymph nodes adjacent to the descending thoracic aorta and  paraspinal region which have increased in size but remain normal in size.. Abdomen and pelvis: There has been interval development of adenopathy. In the portacaval space there  is a lymph node that measures 17 mm in short axis. There are retroperitoneal  lymph nodes that are enlarged as well. The largest lymph node in the  retroperitoneum is posterior to the inferior vena cava beginning at the level of  the right renal artery this extends inferiorly. This measures 2.4 x 1.0 x 4.0  cm. There are also enlarged lymph nodes in the left aortic region largest  measuring 12 mm in short axis. There are also lymph nodes posterior to the head of the pancreas and anterior to  the inferior vena cava which are enlarged the largest measures 10 mm in short  axis  There are enlarged lymph nodes in the interaortocaval space. Enlarged lymph  nodes along the right common iliac artery largest measuring approximately 12 mm  in short axis. There are enlarged lymph nodes in the right external iliac chain  largest measuring 9 mm. There is an enlarged lymph node along the right pelvic sidewall measuring 2.5 x  1.3 cm. There are postsurgical changes in the right inguinal region with one  tiny locule of air. There are residual lymph nodes in the right inguinal largest  measuring 10 mm. .  The uterus images normally. There is a 1 cm cyst in the right ovary. There is no free fluid. Review of the bone windows reveals no destructive lesions. The liver and gallbladder are unremarkable. The spleen is normal in size and  configuration. The pancreas and adrenal glands and kidneys are unremarkable. The aorta and inferior vena cava are unremarkable. IMPRESSION:  1. There is is adenopathy as described. There is adenopathy in the right  inguinal region, along the right pelvic sidewall, along the right iliac chain,  bilateral retroperitoneum, and in the portacaval space as well as posterior to  the pancreas head.   2. In the posterior mediastinum adjacent to the descending thoracic aorta are  lymph nodes that measure less than 1 cm in size but these have increased in the  interval.  3. Please see above text    PET 2/10/20:   FINDINGS:  HEAD/NECK: No apparent foci of abnormal hypermetabolism. Cerebral evaluation is  limited by normal intense activity. CHEST: Mildly hypermetabolic left supraclavicular lymph node, maximum SUV 4.2. ABDOMEN/PELVIS: Mildly hypermetabolic portacaval, aortocaval, and left  para-aortic lymph nodes are noted, maximum SUV 5.5 of an aortocaval lymph node. Hypermetabolic right common iliac, right external iliac, right obturator, and  right inguinal lymph nodes.   SKELETON: No foci of abnormal hypermetabolism in the axial and visualized  appendicular skeleton. IMPRESSION: Mildly hypermetabolic left supraclavicular, abdominal,  retroperitoneal, and pelvic lymph nodes. Ch/abd/pelvis CT 5/7/20:  Lymph nodes in chest: There is no new mediastinal, hilar or axillary lymphadenopathy. Subcentimeter bilateral axillary lymph nodes are unchanged. Subcentimeter  posterior mediastinal lymph node abutting the descending thoracic aorta is  unchanged compared to prior CT dated January 2020. 1.3 cm x 1.1 cm left  supraclavicular lymph node is unchanged compared to prior CT dated January 2020. Lymph nodes in abdom/pelvis: There is a prominent portacaval lymph node measuring approximately  1.6 cm x 2.3 cm, unchanged compared to prior CT dated January 2020. There are multiple prominent and mildly enlarged retroperitoneal lymph nodes, unchanged  compared to prior CT dated January 2020. For example, there is a left  para-aortic retroperitoneal lymph node measures 1.2 cm x 1.6 cm, unchanged  compared to prior CT dated January 2020. As an additional example, there is a 9  mm x 1.3 cm aortocaval lymph node, unchanged compared to prior CT dated January 2020. Right iliac lymph nodes are unchanged compared to prior CT dated January 2020.  For example, the largest right iliac lymph node measures 1.1 cm x 1.4 cm,  unchanged compared to prior CT dated January 2020. Right internal obturator  lymph node is unchanged compared to prior CT dated January 2020) measuring  approximately 2.8 cm x 1.4 cm. Prominent right inguinal lymph nodes are not  significantly changed in size. For example, there is a 1 cm x 1.4 cm right  inguinal lymph node, unchanged compared to prior CT dated January 2020. IMPRESSION: No interval change. Discussed with radiology - the right RP LN near R renal artery is unchanged. The 4cm dimension is craniocaudal.    11/9/20 CT c/a/p:  FINDINGS:   LYMPH NODES: There is a prominent portacaval lymph node measuring approximately  1.8 x 2.0 cm, not significantly changed from prior measurements of 1.6 cm x 2.3  cm. There are  multiple prominent and mildly enlarged retroperitoneal lymph nodes, not  significantly changed from prior. For example, there is a left para-aortic  retroperitoneal lymph node that measures 1.1 x 1.7 cm, not significantly changed  from prior measurements of 1.2 x 1.6 and a 10 x 14 mm aortocaval lymph node, not  significantly changed from prior measurements of 9 x 13 mm. Right iliac lymph  nodes are little changed, though the largest has slightly increased in size  measuring 1.3 x 1.6 cm, previously 1.1 x 1.4 cm. Right internal obturator is not  significantly changed measuring 1.1 x 2.7 cm, previously 1.3 x 3.0 cm. Prominent  right inguinal lymph nodes are not significantly changed in size measuring 1.0 x  1.4 cm. IMPRESSION: No significant interval change apart from a single right common  iliac pelvic lymph nodes which shows slight increase in size by measurement from  1.1 x 1.4 cm to 1.3 x 1.6 cm. Assessment & Plan:   Cha Vaughan is a 49 y.o. female with Depression comes in for evaluation and management of Follicular lymphoma. 1. Follicular lymphoma: Stage III (based on L supraclav and abd/pelvis LAD). Normal LDH and no cytopenias or B symptoms present.  Discussed that this is an indolent lymphoma which does not necessarily recommend treatment at time of diagnosis. Patients are observed with H&P and labs every 3-6 months. Treatment is for those who develop B symptoms, cytopenias or bulky disease (LN >7cm). At this time, I am unclear whether patient's symptoms are related to the disease. Furthermore, there are no \"bulky\" LNs > 7cm, but there is a 4cm LN in the right retroperitoneal LN region which if it increased significantly in size could risk of compression of renal vasculature. Bone marrow biopsy shows < 1% monoclonal CD10 positive B cell population (seen on flow cytometry), which is considered negative for BM involvement. PET shows only mildly hypermetabolic uptake with max SUV of 5.5. Treatment options for Stage III, grade 1/2 FL include observation vs chemoimmunotherapy with BR regimen vs single agent Rituxan. I recommend continued observation at this time. Repeat CT 11/2020 shows increase in single right iliac pelvic node but overall no significant change compared to CT in 5/2020. If the abdominal pain worsens in the future, single agent Rituximab is a reasonable option to see if shrinkage of the LN helps the abdominal pain. -- Continue observation - will advise patient on more aggressive management of constipation. -- Repeat CT of ch/abd/pelvis in approx 1 yr at 11/2021.   -- Return in 6 months with labs and MD follow up    2. Depression / ADHD: On Buproprion, Fluoxetine, Vyvanse. 3. Abdominal and back pain: Much improved after cholecystectomy in Dec 2020. Likely unrelated to the lymphoma. We previously discussed that shon-menopausal state can cause GI upset as can factors such as diet, stress. Reviewed healthy options. 4. Intermittent pruritus: Unclear etiology and may be related to allergic rhinitis. However, pt had severe episode of pruritus several years ago.     5. Constipation / L-sided abdom pan: Not currently on any medications for constipation and this may be what is causing the left sided abdominal pain. -- Docusate 100mg bid, can start Metamucil every other day. Add Miralax if no BM for 2 days. Emotional well being: Pt is coping well with his/her disease and has excellent support. I appreciate the opportunity to participate in Ms. Yao Irizarry care.     Signed By: Mary Voss MD     March 29, 2021

## 2021-03-29 ENCOUNTER — OFFICE VISIT (OUTPATIENT)
Dept: ONCOLOGY | Age: 51
End: 2021-03-29
Payer: COMMERCIAL

## 2021-03-29 VITALS
BODY MASS INDEX: 24.77 KG/M2 | WEIGHT: 157.8 LBS | DIASTOLIC BLOOD PRESSURE: 79 MMHG | HEART RATE: 75 BPM | HEIGHT: 67 IN | TEMPERATURE: 98.1 F | SYSTOLIC BLOOD PRESSURE: 125 MMHG

## 2021-03-29 DIAGNOSIS — R53.82 CHRONIC FATIGUE: ICD-10-CM

## 2021-03-29 DIAGNOSIS — K59.00 CONSTIPATION, UNSPECIFIED CONSTIPATION TYPE: ICD-10-CM

## 2021-03-29 DIAGNOSIS — C82.13 FOLLICULAR LYMPHOMA GRADE II OF INTRA-ABDOMINAL LYMPH NODES (HCC): Primary | ICD-10-CM

## 2021-03-29 DIAGNOSIS — R10.12 LEFT UPPER QUADRANT ABDOMINAL PAIN: ICD-10-CM

## 2021-03-29 PROCEDURE — 99214 OFFICE O/P EST MOD 30 MIN: CPT | Performed by: INTERNAL MEDICINE

## 2021-03-29 RX ORDER — DOCUSATE SODIUM 100 MG/1
100 CAPSULE, LIQUID FILLED ORAL 2 TIMES DAILY
Qty: 60 CAP | Refills: 2 | Status: SHIPPED | OUTPATIENT
Start: 2021-03-29 | End: 2021-06-27

## 2021-03-29 NOTE — PROGRESS NOTES
Marquis Alex is a 49 y.o. female follow up for lymphoma. 1. Have you been to the ER, urgent care clinic since your last visit? Hospitalized since your last visit?no     2. Have you seen or consulted any other health care providers outside of the [address] [place] [address] since your last visit? Include any pap smears or colon screening.  no

## 2021-06-24 ENCOUNTER — OFFICE VISIT (OUTPATIENT)
Dept: INTERNAL MEDICINE CLINIC | Age: 51
End: 2021-06-24
Payer: COMMERCIAL

## 2021-06-24 VITALS
OXYGEN SATURATION: 97 % | RESPIRATION RATE: 16 BRPM | BODY MASS INDEX: 24.14 KG/M2 | WEIGHT: 153.8 LBS | TEMPERATURE: 97.7 F | HEIGHT: 67 IN | DIASTOLIC BLOOD PRESSURE: 69 MMHG | HEART RATE: 84 BPM | SYSTOLIC BLOOD PRESSURE: 101 MMHG

## 2021-06-24 DIAGNOSIS — R07.89 OTHER CHEST PAIN: Primary | ICD-10-CM

## 2021-06-24 DIAGNOSIS — C83.30 DIFFUSE LARGE B-CELL LYMPHOMA, UNSPECIFIED BODY REGION (HCC): ICD-10-CM

## 2021-06-24 PROBLEM — T78.3XXA ANGIOEDEMA: Status: ACTIVE | Noted: 2021-06-24

## 2021-06-24 PROBLEM — T78.40XA ACUTE ALLERGIC REACTION: Status: ACTIVE | Noted: 2021-06-24

## 2021-06-24 PROCEDURE — 93000 ELECTROCARDIOGRAM COMPLETE: CPT | Performed by: INTERNAL MEDICINE

## 2021-06-24 PROCEDURE — 99214 OFFICE O/P EST MOD 30 MIN: CPT | Performed by: INTERNAL MEDICINE

## 2021-06-24 NOTE — PROGRESS NOTES
Sonia Cutler (: 1970) is a 49 y.o. female, established patient, here for evaluation of the following chief complaint(s):  Chest Pain (pain and tight, in the center of chest, radiates to neck, no shortness of breath)       ASSESSMENT/PLAN:  Below is the assessment and plan developed based on review of pertinent history, physical exam, labs, studies, and medications. 1. Diffuse large B-cell lymphoma, unspecified body region [facility])  Pt is followed by oncology (Dr. Tobie Duane) and receiving treatment. 2. Other chest pain  -     CT HEART W/O CONT WITH CALCIUM; Future  I recommend diagnostic testing for the sake of ruling out any serious cardiac threat, even though I do not think this is the case. Normal EKG in office today. Will continue to monitor for improvements or changes. No follow-ups on file. SUBJECTIVE/OBJECTIVE:  HPI    Chest Pain:   Pt states the chest pain is tight in the center of her chest with a subtle expansion upwards. She reports that the sx lasted about 40 minutes on Monday and denies that she had excessive caffeine or any other abnormal stressors. Pt denies extensive stress, nausea, sweatiness, a sour taste, and SOB. The sx are intermittent and don't occur with strenuous activity such as activity. Pt reports that nothing notably alleviates the sx's and that they resolve themself. She came in today because she is worried that these sx's are indicative of a heart attack. Mood:   Pt reports that her mood is stable and she is asymptomatic. Energy level:   Pt reports that her energy levels have much improved since her gallbladder removal.     Other: pt reports that she has felt \"like a million dollars\" since her gallbladder removal. She \"doesn't even feel like she has cancer. \" Her son is moving to [facility]. [facility] [address] for college and she is very happy for him. Review of Systems   Cardiovascular: Positive for chest pain (tightnes, squeezing in center that radiates up her neck). Physical Exam  Constitutional:       Appearance: Normal appearance. HENT:      Right Ear: Tympanic membrane and external ear normal.      Left Ear: Tympanic membrane and external ear normal.      Mouth/Throat:      Mouth: Mucous membranes are moist.      Pharynx: Oropharynx is clear. Cardiovascular:      Rate and Rhythm: Normal rate and regular rhythm. Pulses: Normal pulses. Heart sounds: Normal heart sounds. Pulmonary:      Effort: Pulmonary effort is normal.      Breath sounds: Normal breath sounds. Musculoskeletal:         General: Normal range of motion. Skin:     General: Skin is warm and dry. Neurological:      General: No focal deficit present. Mental Status: She is alert and oriented to person, place, and time. Psychiatric:         Mood and Affect: Mood normal.         Behavior: Behavior normal.           On this date 06/24/2021 I have spent 30 minutes reviewing previous notes, test results and face to face with the patient discussing the diagnosis and importance of compliance with the treatment plan as well as documenting on the day of the visit. An electronic signature was used to authenticate this note.     Written by Jolie Cunningham, as dictated by Dr. Emilie Valiente MD.    -- Harley Agustin

## 2021-07-20 NOTE — TELEPHONE ENCOUNTER
Pt had her cpe in April of this year. She left her Biometric Screening health Care forms for the doctor to complete and fax to the number at the bottom of form. I placed them in the doctor's mailbox. Any questions please call [phone].       Thank you [phone]

## 2021-09-20 NOTE — PROGRESS NOTES
[id number] [facility] On[facility] at [facility]  [phone]    Hematology / Oncology Established Visit    Reason for Visit:   Jadon Murillo is a 49 y.o. female who is seen for follow up of lymphoma. PCP is Dr. Violette Carver. Hematology Oncology Treatment History:     Diagnosis: Follicular lymphoma    Stage: III; FLIPI2 low risk    Pathology:   -1/20/20 Right inguinal LN excision: Follicular lymphoma, grade 1-2, follicular pattern, PM86 positive. Comment:   The LN specimen demonstrates increased follicles, lacking normal germinal centers. IHC markers are performed with good controls. Tumor cells are positive for CD20 and BCL2. CD3 marks background T cells. Cyclin D1 is negative in the lymphoid population. CD21 highlights intact dendritic meshworks. Flow cytometry shows positive expression of CD10 and kappa LC restriction. Ki-67 is pending. Focally there are a few follicles with greater than fifteen centroblasts per HPF; however the majority of follicles have less than 15 centroblasts/HPF, consistent with grade 1-2. Diffuse areas are not seen.    -2/5/20 Bone marrow biopsy: Normocellular marrow with multilineage hematopoiesis and <1% involvement by a CD10 positive B-cell lymphoproliferative disorder   Negative for immunophenotypic or morphologic evidence of dysplasia no increase in blasts. Comment   Flow cytometric analysis reveals a small population of CD10 positive clonal B cells compatible with involvement by the patient's recently diagnosed follicular lymphoma. FISH studies are pending and will be reported. Prior Treatment: None    Current Treatment: Observation    History of Present Illness:   Jadon Murillo is a 49 y.o. female who comes in for evaluation of Follicular lymphoma. She states she has had vague symptoms for several years as follows. Pt reports h/o pruritus which started 6 yrs ago, which finally subsided with Zyrtec.  She saw a hematologist at that time, who told her symptoms might surface as a blood disorder in the future. In 2016, pt went to see her Gyn (Dr. Gema Corrigan) for abdominal cramping. Workup normal at time time. In summer of 2019, pt developed fatigue as well as same of the chronic pain in abdominal area. In fall of 2019, she developed memory issues with work. She went to see her Psychiatrist due to these symptoms and switched her from generic Wellbutrin to brand name Wellbutrin. No improvement after 3-4 weeks. She was okay with sleeping during that time, but continued to have severe fatigue out of character for her. She went to see her PCP and described a right inguinal LN enlargement. She went to see a surgeon, Dr. Val Dempsey. Ultrasound of that region did demonstrate an enlarged LN. FNA was inconclusive. Core needle biopsy was suspicious for B cell lymphoma. Excisional LN biopsy 6/58/30 revealed follicular lymphoma. CT of chest/abd/pelvis was notable for retroperitoneal, pelvic and portacaval LAD. Pt also reports left hip pain, left foot tingling. No fevers, chills, weight loss. She does have occasional sweats more notable a few months ago, but not drenching or nightly. Patient underwent staging bone marrow biopsy and PET scan. She did see [facility] for a 2nd opinion. She was told her GI symptoms are not related to lymphoma. Interval History:  She is seen for f/u of lymphoma. A few weeks ago, she suffered with worsening fatigue despite sleeping well. This has improved since then. Comes and goes. Has 1-2 watery stools per day for the past 2 weeks. Yesterday had abdominal pain described as twinges. No more of the nausea, bloating since the cholecystectomy in 12/2020. Has noted significant bruising after minor trauma (scratching her leg) and moderate trauma (losing balance and falling on and scraping her elbow.) No epistaxis, gum bleeding, melena/hematochezia.        PMHx: Chronic pain, Depression, Fibrocystic breast  PSurgHx: None aside from bilat breast biopsy and wisdom teeth extraction  SHx: Quit smoking 1/2000. Drinks 5 glasses of wine per week. FHx: Mother had breast cancer age 61. Father had CVA. Review of Systems: A complete review of systems was obtained, negative except as described above. Physical Exam:     Visit Vitals  /69   Pulse 86   Temp (!) 96.7 °F (35.9 °C)   Resp 16   Ht 5' 7\" (1.702 m)   Wt 154 lb 3.2 oz (69.9 kg)   SpO2 95%   BMI 24.15 kg/m²     ECOG PS: 0  General: Well developed, no acute distress  Eyes: PERRLA, EOMI, anicteric sclerae  HENT: Atraumatic, OP clear  Neck: Supple  Lymphatic: No cervical, supraclavicular, axillary  adenopathy  Respiratory: CTAB, normal respiratory effort  CV: Normal rate, regular rhythm, no murmurs, no peripheral edema  GI: Soft, nontender, nondistended, no masses  MS: Normal gait and station. Digits without clubbing or cyanosis. Skin: No rashes, ecchymoses, or petechiae. Normal temperature, turgor, and texture. Neuro/Psych: Alert, oriented. Moves all 4 extremities. Appropriate affect, normal judgment/insight. Results:     Lab Results   Component Value Date/Time    WBC 5.8 03/22/2021 02:02 PM    HGB 14.2 03/22/2021 02:02 PM    HCT 43.0 03/22/2021 02:02 PM    PLATELET 696 72/46/1231 02:02 PM    MCV 97.9 03/22/2021 02:02 PM    ABS. NEUTROPHILS 3.3 03/22/2021 02:02 PM     Lab Results   Component Value Date/Time    Sodium 137 03/22/2021 02:02 PM    Potassium 3.9 03/22/2021 02:02 PM    Chloride 105 03/22/2021 02:02 PM    CO2 28 03/22/2021 02:02 PM    Glucose 98 03/22/2021 02:02 PM    BUN 12 03/22/2021 02:02 PM    Creatinine 0.62 03/22/2021 02:02 PM    GFR est AA >60 03/22/2021 02:02 PM    GFR est non-AA >60 03/22/2021 02:02 PM    Calcium 9.4 03/22/2021 02:02 PM     Lab Results   Component Value Date/Time    Bilirubin, total 0.6 03/22/2021 02:02 PM    ALT (SGPT) 24 03/22/2021 02:02 PM    Alk.  phosphatase 95 03/22/2021 02:02 PM    Protein, total 7.5 03/22/2021 02:02 PM    Albumin 4.5 03/22/2021 02:02 PM    Globulin 3.0 03/22/2021 02:02 PM     No results found for: IRON, FE, TIBC, IBCT, PSAT, FERR    No results found for: B12LT, FOL, RBCF  Lab Results   Component Value Date/Time    TSH 1.830 09/25/2019 12:45 PM     Lab Results   Component Value Date/Time    Hepatitis B surface Ab <3.10 01/24/2020 08:13 AM         Imaging:     Chest CT  1/24/20:  FINDINGS:  The normal-sized axillary lymph nodes are unchanged. THYROID: No nodule. MEDIASTINUM: No mass or lymphadenopathy. MARCE: No mass or lymphadenopathy. THORACIC AORTA: No aneurysm. MAIN PULMONARY ARTERY: Normal in caliber. TRACHEA/BRONCHI: Patent. ESOPHAGUS: No wall thickening or dilatation. HEART: Normal in size. PLEURA: No effusion or pneumothorax. LUNGS: Laterally in the left lower lobe on image 53 is a 2 to 3 mm pulmonary  nodule which is unchanged when compared with the examination of 6/27/2017. This  is a benign nodule and not significant. .  There are normal size lymph nodes adjacent to the descending thoracic aorta and  paraspinal region which have increased in size but remain normal in size. .  Abdomen and pelvis: There has been interval development of adenopathy. In the portacaval space there  is a lymph node that measures 17 mm in short axis. There are retroperitoneal  lymph nodes that are enlarged as well. The largest lymph node in the  retroperitoneum is posterior to the inferior vena cava beginning at the level of  the right renal artery this extends inferiorly. This measures 2.4 x 1.0 x 4.0  cm. There are also enlarged lymph nodes in the left aortic region largest  measuring 12 mm in short axis. There are also lymph nodes posterior to the head of the pancreas and anterior to  the inferior vena cava which are enlarged the largest measures 10 mm in short  axis  There are enlarged lymph nodes in the interaortocaval space. Enlarged lymph  nodes along the right common iliac artery largest measuring approximately 12 mm  in short axis.  There are enlarged lymph nodes in the right external iliac chain  largest measuring 9 mm. There is an enlarged lymph node along the right pelvic sidewall measuring 2.5 x  1.3 cm. There are postsurgical changes in the right inguinal region with one  tiny locule of air. There are residual lymph nodes in the right inguinal largest  measuring 10 mm. .  The uterus images normally. There is a 1 cm cyst in the right ovary. There is no free fluid. Review of the bone windows reveals no destructive lesions. The liver and gallbladder are unremarkable. The spleen is normal in size and  configuration. The pancreas and adrenal glands and kidneys are unremarkable. The aorta and inferior vena cava are unremarkable. IMPRESSION:  1. There is is adenopathy as described. There is adenopathy in the right  inguinal region, along the right pelvic sidewall, along the right iliac chain,  bilateral retroperitoneum, and in the portacaval space as well as posterior to  the pancreas head. 2. In the posterior mediastinum adjacent to the descending thoracic aorta are  lymph nodes that measure less than 1 cm in size but these have increased in the  interval.  3. Please see above text    PET 2/10/20:   FINDINGS:  HEAD/NECK: No apparent foci of abnormal hypermetabolism. Cerebral evaluation is  limited by normal intense activity. CHEST: Mildly hypermetabolic left supraclavicular lymph node, maximum SUV 4.2. ABDOMEN/PELVIS: Mildly hypermetabolic portacaval, aortocaval, and left  para-aortic lymph nodes are noted, maximum SUV 5.5 of an aortocaval lymph node. Hypermetabolic right common iliac, right external iliac, right obturator, and  right inguinal lymph nodes.   SKELETON: No foci of abnormal hypermetabolism in the axial and visualized  appendicular skeleton. IMPRESSION: Mildly hypermetabolic left supraclavicular, abdominal,  retroperitoneal, and pelvic lymph nodes.     Ch/abd/pelvis CT 5/7/20:  Lymph nodes in chest: There is no new mediastinal, hilar or axillary lymphadenopathy. Subcentimeter bilateral axillary lymph nodes are unchanged. Subcentimeter  posterior mediastinal lymph node abutting the descending thoracic aorta is  unchanged compared to prior CT dated January 2020. 1.3 cm x 1.1 cm left  supraclavicular lymph node is unchanged compared to prior CT dated January 2020. Lymph nodes in abdom/pelvis: There is a prominent portacaval lymph node measuring approximately  1.6 cm x 2.3 cm, unchanged compared to prior CT dated January 2020. There are multiple prominent and mildly enlarged retroperitoneal lymph nodes, unchanged  compared to prior CT dated January 2020. For example, there is a left  para-aortic retroperitoneal lymph node measures 1.2 cm x 1.6 cm, unchanged  compared to prior CT dated January 2020. As an additional example, there is a 9  mm x 1.3 cm aortocaval lymph node, unchanged compared to prior CT dated January 2020. Right iliac lymph nodes are unchanged compared to prior CT dated January 2020. For example, the largest right iliac lymph node measures 1.1 cm x 1.4 cm,  unchanged compared to prior CT dated January 2020. Right internal obturator  lymph node is unchanged compared to prior CT dated January 2020) measuring  approximately 2.8 cm x 1.4 cm. Prominent right inguinal lymph nodes are not  significantly changed in size. For example, there is a 1 cm x 1.4 cm right  inguinal lymph node, unchanged compared to prior CT dated January 2020. IMPRESSION: No interval change. Discussed with radiology - the right RP LN near R renal artery is unchanged. The 4cm dimension is craniocaudal.    11/9/20 CT c/a/p:  FINDINGS:   LYMPH NODES: There is a prominent portacaval lymph node measuring approximately  1.8 x 2.0 cm, not significantly changed from prior measurements of 1.6 cm x 2.3  cm. There are  multiple prominent and mildly enlarged retroperitoneal lymph nodes, not  significantly changed from prior.  For example, there is a left para-aortic  retroperitoneal lymph node that measures 1.1 x 1.7 cm, not significantly changed  from prior measurements of 1.2 x 1.6 and a 10 x 14 mm aortocaval lymph node, not  significantly changed from prior measurements of 9 x 13 mm. Right iliac lymph  nodes are little changed, though the largest has slightly increased in size  measuring 1.3 x 1.6 cm, previously 1.1 x 1.4 cm. Right internal obturator is not  significantly changed measuring 1.1 x 2.7 cm, previously 1.3 x 3.0 cm. Prominent  right inguinal lymph nodes are not significantly changed in size measuring 1.0 x  1.4 cm. IMPRESSION: No significant interval change apart from a single right common  iliac pelvic lymph nodes which shows slight increase in size by measurement from  1.1 x 1.4 cm to 1.3 x 1.6 cm. Assessment & Plan:   Erika Ross is a 49 y.o. female with Depression comes in for evaluation and management of Follicular lymphoma. 1. Follicular lymphoma:   Stage III (based on L supraclav and abd/pelvis LAD). Normal LDH and no cytopenias or B symptoms present. Discussed that this is an indolent lymphoma which does not necessarily recommend treatment at time of diagnosis. Patients are observed with H&P and labs every 3-6 months. Treatment is for those who develop B symptoms, cytopenias or bulky disease (LN >7cm). At this time, I am unclear whether patient's symptoms are related to the disease. Furthermore, there are no \"bulky\" LNs > 7cm, but there is a 4cm LN in the right retroperitoneal LN region which if it increased significantly in size could risk of compression of renal vasculature. Bone marrow biopsy shows < 1% monoclonal CD10 positive B cell population (seen on flow cytometry), which is considered negative for BM involvement. PET shows only mildly hypermetabolic uptake with max SUV of 5.5. Treatment options for Stage III, grade 1/2 FL include observation vs chemoimmunotherapy with BR regimen vs single agent Rituxan.  I recommend continued observation at this time. Repeat CT 11/2020 shows increase in single right iliac pelvic node but overall no significant change compared to CT in 5/2020. If the abdominal pain worsens in the future, single agent Rituximab is a reasonable option to see if shrinkage of the LN helps the abdominal pain. -- Continue observation for now  -- Repeat CT of ch/abd/pelvis due 11/2021.   -- Return in 2 months with labs and MD follow up    2. Depression / ADHD: On Buproprion, Fluoxetine, Vyvanse. 3. Abdominal and back pain: Improved after cholecystectomy in Dec 2020. Likely unrelated to the lymphoma. We previously discussed that shon-menopausal state can cause GI upset as can factors such as diet, stress. Reviewed healthy options. 4. Intermittent pruritus: Unclear etiology and may be related to allergic rhinitis. However, pt had severe episode of pruritus several years ago. 5. Diarrhea: Unclear etiology as no dietary changes, fevers, etc. Has prior h/o constipation. I do not feel this is related to the lymphoma, but I do recommend GI evaluation and follow up with PCP if not improving within 1-2 weeks. 6. Bruising:  Likely due to SSRI. No significant bleeding episodes. Emotional well being: Pt is coping well with his/her disease and has excellent support. I appreciate the opportunity to participate in Ms[facility]rtle Canda Crigler mague.     Signed By: Carson Valadez MD     September 23, 2021

## 2021-09-21 LAB
ALBUMIN SERPL-MCNC: 4.8 G/DL (ref 3.8–4.8)
ALBUMIN/GLOB SERPL: 2.3 {RATIO} (ref 1.2–2.2)
ALP SERPL-CCNC: 99 IU/L (ref 44–121)
ALT SERPL-CCNC: 14 IU/L (ref 0–32)
AST SERPL-CCNC: 20 IU/L (ref 0–40)
BASOPHILS # BLD AUTO: 0.1 X10E3/UL (ref 0–0.2)
BASOPHILS NFR BLD AUTO: 2 %
BILIRUB SERPL-MCNC: 0.6 MG/DL (ref 0–1.2)
BUN SERPL-MCNC: 10 MG/DL (ref 6–24)
BUN/CREAT SERPL: 15 (ref 9–23)
CALCIUM SERPL-MCNC: 9.7 MG/DL (ref 8.7–10.2)
CHLORIDE SERPL-SCNC: 101 MMOL/L (ref 96–106)
CO2 SERPL-SCNC: 26 MMOL/L (ref 20–29)
CREAT SERPL-MCNC: 0.66 MG/DL (ref 0.57–1)
EOSINOPHIL # BLD AUTO: 0.4 X10E3/UL (ref 0–0.4)
EOSINOPHIL NFR BLD AUTO: 4 %
ERYTHROCYTE [DISTWIDTH] IN BLOOD BY AUTOMATED COUNT: 12.1 % (ref 11.7–15.4)
GLOBULIN SER CALC-MCNC: 2.1 G/DL (ref 1.5–4.5)
GLUCOSE SERPL-MCNC: 92 MG/DL (ref 65–99)
HCT VFR BLD AUTO: 41.5 % (ref 34–46.6)
HGB BLD-MCNC: 14 G/DL (ref 11.1–15.9)
IMM GRANULOCYTES # BLD AUTO: 0 X10E3/UL (ref 0–0.1)
IMM GRANULOCYTES NFR BLD AUTO: 0 %
LDH SERPL-CCNC: 179 IU/L (ref 119–226)
LYMPHOCYTES # BLD AUTO: 2.2 X10E3/UL (ref 0.7–3.1)
LYMPHOCYTES NFR BLD AUTO: 27 %
MCH RBC QN AUTO: 32.5 PG (ref 26.6–33)
MCHC RBC AUTO-ENTMCNC: 33.7 G/DL (ref 31.5–35.7)
MCV RBC AUTO: 96 FL (ref 79–97)
MONOCYTES # BLD AUTO: 0.9 X10E3/UL (ref 0.1–0.9)
MONOCYTES NFR BLD AUTO: 10 %
NEUTROPHILS # BLD AUTO: 4.7 X10E3/UL (ref 1.4–7)
NEUTROPHILS NFR BLD AUTO: 57 %
PLATELET # BLD AUTO: 362 X10E3/UL (ref 150–450)
POTASSIUM SERPL-SCNC: 4.3 MMOL/L (ref 3.5–5.2)
PROT SERPL-MCNC: 6.9 G/DL (ref 6–8.5)
RBC # BLD AUTO: 4.31 X10E6/UL (ref 3.77–5.28)
SODIUM SERPL-SCNC: 138 MMOL/L (ref 134–144)
WBC # BLD AUTO: 8.2 X10E3/UL (ref 3.4–10.8)

## 2021-09-23 ENCOUNTER — OFFICE VISIT (OUTPATIENT)
Dept: ONCOLOGY | Age: 51
End: 2021-09-23
Payer: COMMERCIAL

## 2021-09-23 VITALS
SYSTOLIC BLOOD PRESSURE: 109 MMHG | RESPIRATION RATE: 16 BRPM | HEART RATE: 86 BPM | DIASTOLIC BLOOD PRESSURE: 69 MMHG | WEIGHT: 154.2 LBS | TEMPERATURE: 96.7 F | BODY MASS INDEX: 24.2 KG/M2 | HEIGHT: 67 IN | OXYGEN SATURATION: 95 %

## 2021-09-23 DIAGNOSIS — R53.82 CHRONIC FATIGUE: ICD-10-CM

## 2021-09-23 DIAGNOSIS — R19.7 DIARRHEA, UNSPECIFIED TYPE: ICD-10-CM

## 2021-09-23 DIAGNOSIS — C82.13 FOLLICULAR LYMPHOMA GRADE II OF INTRA-ABDOMINAL LYMPH NODES (HCC): Primary | ICD-10-CM

## 2021-09-23 DIAGNOSIS — T14.8XXA BRUISING: ICD-10-CM

## 2021-09-23 DIAGNOSIS — L29.9 PRURITUS: ICD-10-CM

## 2021-09-23 PROCEDURE — 99214 OFFICE O/P EST MOD 30 MIN: CPT | Performed by: INTERNAL MEDICINE

## 2021-09-23 NOTE — LETTER
9/23/2021    Patient: Gabriella Alvarez   YOB: 1970   Date of Visit: 9/23/2021     Blane Pugh MD  [address] [facility]  [address]  [address] [place] [address]  [address] [facility] [address]    Dear Blane Pugh MD,      Thank you for referring Ms. Michael Euceda to [facility] for evaluation. My notes for this consultation are attached. If you have questions, please do not hesitate to call me. I look forward to following your patient along with you.       Sincerely,    Martín Balderas MD

## 2021-09-23 NOTE — PROGRESS NOTES
Chief Complaint   Patient presents with    Follow-up     Ha Sanabria is a pleasant 49year old woman who presents as a follow up for lymphoma.  She denies pain

## 2021-11-11 ENCOUNTER — TRANSCRIBE ORDER (OUTPATIENT)
Dept: SCHEDULING | Age: 51
End: 2021-11-11

## 2021-11-11 DIAGNOSIS — Z12.31 SCREENING MAMMOGRAM FOR HIGH-RISK PATIENT: Primary | ICD-10-CM

## 2021-11-17 ENCOUNTER — TELEPHONE (OUTPATIENT)
Dept: ONCOLOGY | Age: 51
End: 2021-11-17

## 2021-11-17 DIAGNOSIS — R41.3 MEMORY LOSS OR IMPAIRMENT: ICD-10-CM

## 2021-11-17 DIAGNOSIS — C82.13 FOLLICULAR LYMPHOMA GRADE II OF INTRA-ABDOMINAL LYMPH NODES (HCC): Primary | ICD-10-CM

## 2021-11-17 NOTE — TELEPHONE ENCOUNTER
11/17/21 1:43 PM Called patient and advised I would like her to get a brain MRI to r/o out other causes of memory loss. Provided contact number and advised I will call her once I get the results. Patient verbalized understanding.

## 2021-11-17 NOTE — TELEPHONE ENCOUNTER
Patient called and stated that she has a CT scan tomorrow and she went and saw her psychiatrist yesterday and patient informed them that she has been having memory problems and he stated that maybe Dr. Sujatha Guerrero could add on a brain scan tomorrow along with the CT scan.  Please advise         BOB# [phone]

## 2021-11-17 NOTE — TELEPHONE ENCOUNTER
[address] at [facility]  [phone]        11/17/21 10:06 AM Return call placed to patient. Patient has complaints of difficulty remembering things. Patient states she noticed this when she was first diagnosed with lymphoma, but now symptoms are occurring everyday for the past 1-1.5 months. Patient provided an example of inability to remember anything from an interview with a job applicant that occurred two days before. Patient states she takes many notes in attempt to better remember things. Patient denies slurred speech/difficulty with speech, denies difficulty writing, and weakness. Patient shared she has had a history of left foot and leg tingling but this is improving and now occurring every now and then. Patient also stated she has a subtle shooting pain on right side of head. Patient had a migraine in the past that presented the same way, so she is attributing head pain to be related to possible migraine. Patient states her PCP advised that Prozac can sometimes cause memory problems, PCP has decreased dose. Patient was advised to contact Dr. Emilee Newman to discuss above symptoms and inquire if imaging of brain could be ordered since patient is already scheduled for CT chest/abd/pelv tomorrow. Advised this nurse would forward above to Dr. Emilee Newman and Maria Teresa Hay and call patient back. She voiced understanding.

## 2021-11-18 ENCOUNTER — HOSPITAL ENCOUNTER (OUTPATIENT)
Dept: CT IMAGING | Age: 51
Discharge: HOME | End: 2021-11-18
Attending: INTERNAL MEDICINE
Payer: COMMERCIAL

## 2021-11-18 DIAGNOSIS — C82.13 FOLLICULAR LYMPHOMA GRADE II OF INTRA-ABDOMINAL LYMPH NODES (HCC): ICD-10-CM

## 2021-11-18 PROCEDURE — 74177 CT ABD & PELVIS W/CONTRAST: CPT

## 2021-11-18 PROCEDURE — 74011000636 HC RX REV CODE- 636: Performed by: INTERNAL MEDICINE

## 2021-11-18 RX ADMIN — IOPAMIDOL 100 ML: 755 INJECTION, SOLUTION INTRAVENOUS at 13:15

## 2021-11-23 NOTE — PROGRESS NOTES
[id number] [facility] at [address] [place] [address]  [phone]    Hematology / Oncology Established Visit    Reason for Visit:   Devorah Amador is a 49 y.o. female who is seen for follow up of lymphoma. PCP is Dr. Michelle Pabon. Hematology Oncology Treatment History:     Diagnosis: Follicular lymphoma    Stage: III; FLIPI2 low risk    Pathology:   -1/20/20 Right inguinal LN excision: Follicular lymphoma, grade 1-2, follicular pattern, [id number] positive. Comment:   The LN specimen demonstrates increased follicles, lacking normal germinal centers. IHC markers are performed with good controls. Tumor cells are positive for CD20 and BCL2. CD3 marks background T cells. Cyclin D1 is negative in the lymphoid population. CD21 highlights intact dendritic meshworks. Flow cytometry shows positive expression of CD10 and kappa LC restriction. Ki-67 is pending. Focally there are a few follicles with greater than fifteen centroblasts per HPF; however the majority of follicles have less than 15 centroblasts/HPF, consistent with grade 1-2. Diffuse areas are not seen.    -2/5/20 Bone marrow biopsy: Normocellular marrow with multilineage hematopoiesis and <1% involvement by a CD10 positive B-cell lymphoproliferative disorder   Negative for immunophenotypic or morphologic evidence of dysplasia no increase in blasts. Comment   Flow cytometric analysis reveals a small population of CD10 positive clonal B cells compatible with involvement by the patient's recently diagnosed follicular lymphoma. FISH studies are pending and will be reported. Prior Treatment: None    Current Treatment: Observation    History of Present Illness:   Devorah Amador is a 49 y.o. female who comes in for evaluation of Follicular lymphoma. She states she has had vague symptoms for several years as follows. Pt reports h/o pruritus which started 6 yrs ago, which finally subsided with Zyrtec.  She saw a hematologist at that time, who told her symptoms might surface as a blood disorder in the future. In 2016, pt went to see her Gyn (Dr. Gilbert Yanez) for abdominal cramping. Workup normal at time time. In summer of 2019, pt developed fatigue as well as same of the chronic pain in abdominal area. In fall of 2019, she developed memory issues with work. She went to see her Psychiatrist due to these symptoms and switched her from generic Wellbutrin to brand name Wellbutrin. No improvement after 3-4 weeks. She was okay with sleeping during that time, but continued to have severe fatigue out of character for her. She went to see her PCP and described a right inguinal LN enlargement. She went to see a surgeon, Dr. Pascale Frost. Ultrasound of that region did demonstrate an enlarged LN. FNA was inconclusive. Core needle biopsy was suspicious for B cell lymphoma. Excisional LN biopsy 0/77/00 revealed follicular lymphoma. CT of chest/abd/pelvis was notable for retroperitoneal, pelvic and portacaval LAD. Pt also reports left hip pain, left foot tingling. No fevers, chills, weight loss. She does have occasional sweats more notable a few months ago, but not drenching or nightly. Patient underwent staging bone marrow biopsy and PET scan. She did see [facility] for a 2nd opinion. She was told her GI symptoms are not related to lymphoma. Interval History:  She is seen for f/u of lymphoma. Pt feels very fatigued, lethargic last week. Continues to have intermittent diarrhea and constipation. No fevers, but felt achy and cold. Mild nausea at times. Has had pain in her left lower abdomen and back. No recent infections. Also noted small LN in R groin. PMHx: Chronic pain, Depression, Fibrocystic breast  PSurgHx: None aside from bilat breast biopsy and wisdom teeth extraction  SHx: Quit smoking 1/2000. Drinks 5 glasses of wine per week. FHx: Mother had breast cancer age 61. Father had CVA.    Review of Systems: A complete review of systems was obtained, negative except as described above. Physical Exam:     Visit Vitals  /75   Pulse 86   Temp (!) 96.1 °F (35.6 °C)   Resp 16   Ht 5' 7\" (1.702 m)   Wt 158 lb 9.6 oz (71.9 kg)   SpO2 94%   BMI 24.84 kg/m²     ECOG PS: 0  General: no distress  Eyes: anicteric sclerae  HENT: oropharynx clear  Neck: supple  Lymphatic: no cervical, axillary adenopathy; has mild R inguinal LN and R supraclavicular fullness. Respiratory: normal respiratory effort  CV: no peripheral edema  GI: soft, nontender, nondistended, no masses  Skin: no rashes; no ecchymoses; no petechiae        Results:     Lab Results   Component Value Date/Time    WBC 8.2 09/20/2021 12:00 AM    HGB 14.0 09/20/2021 12:00 AM    HCT 41.5 09/20/2021 12:00 AM    PLATELET 229 75/32/2170 12:00 AM    MCV 96 09/20/2021 12:00 AM    ABS. NEUTROPHILS 4.7 09/20/2021 12:00 AM     Lab Results   Component Value Date/Time    Sodium 138 09/20/2021 12:00 AM    Potassium 4.3 09/20/2021 12:00 AM    Chloride 101 09/20/2021 12:00 AM    CO2 26 09/20/2021 12:00 AM    Glucose 92 09/20/2021 12:00 AM    BUN 10 09/20/2021 12:00 AM    Creatinine 0.66 09/20/2021 12:00 AM    GFR est  09/20/2021 12:00 AM    GFR est non- 09/20/2021 12:00 AM    Calcium 9.7 09/20/2021 12:00 AM     Lab Results   Component Value Date/Time    Bilirubin, total 0.6 09/20/2021 12:00 AM    ALT (SGPT) 14 09/20/2021 12:00 AM    Alk. phosphatase 99 09/20/2021 12:00 AM    Protein, total 6.9 09/20/2021 12:00 AM    Albumin 4.8 09/20/2021 12:00 AM    Globulin 3.0 03/22/2021 02:02 PM     No results found for: IRON, FE, TIBC, IBCT, PSAT, FERR    No results found for: B12LT, FOL, RBCF  Lab Results   Component Value Date/Time    TSH 1.830 09/25/2019 12:45 PM     Lab Results   Component Value Date/Time    Hepatitis B surface Ab <3.10 01/24/2020 08:13 AM         Imaging:     Chest CT  1/24/20:  FINDINGS:  The normal-sized axillary lymph nodes are unchanged. THYROID: No nodule.   MEDIASTINUM: No mass or lymphadenopathy. MARCE: No mass or lymphadenopathy. THORACIC AORTA: No aneurysm. MAIN PULMONARY ARTERY: Normal in caliber. TRACHEA/BRONCHI: Patent. ESOPHAGUS: No wall thickening or dilatation. HEART: Normal in size. PLEURA: No effusion or pneumothorax. LUNGS: Laterally in the left lower lobe on image 53 is a 2 to 3 mm pulmonary  nodule which is unchanged when compared with the examination of 6/27/2017. This  is a benign nodule and not significant. .  There are normal size lymph nodes adjacent to the descending thoracic aorta and  paraspinal region which have increased in size but remain normal in size. .  Abdomen and pelvis: There has been interval development of adenopathy. In the portacaval space there  is a lymph node that measures 17 mm in short axis. There are retroperitoneal  lymph nodes that are enlarged as well. The largest lymph node in the  retroperitoneum is posterior to the inferior vena cava beginning at the level of  the right renal artery this extends inferiorly. This measures 2.4 x 1.0 x 4.0  cm. There are also enlarged lymph nodes in the left aortic region largest  measuring 12 mm in short axis. There are also lymph nodes posterior to the head of the pancreas and anterior to  the inferior vena cava which are enlarged the largest measures 10 mm in short  axis  There are enlarged lymph nodes in the interaortocaval space. Enlarged lymph  nodes along the right common iliac artery largest measuring approximately 12 mm  in short axis. There are enlarged lymph nodes in the right external iliac chain  largest measuring 9 mm. There is an enlarged lymph node along the right pelvic sidewall measuring 2.5 x  1.3 cm. There are postsurgical changes in the right inguinal region with one  tiny locule of air. There are residual lymph nodes in the right inguinal largest  measuring 10 mm. .  The uterus images normally. There is a 1 cm cyst in the right ovary. There is no free fluid.   Review of the bone windows reveals no destructive lesions. The liver and gallbladder are unremarkable. The spleen is normal in size and  configuration. The pancreas and adrenal glands and kidneys are unremarkable. The aorta and inferior vena cava are unremarkable. IMPRESSION:  1. There is is adenopathy as described. There is adenopathy in the right  inguinal region, along the right pelvic sidewall, along the right iliac chain,  bilateral retroperitoneum, and in the portacaval space as well as posterior to  the pancreas head. 2. In the posterior mediastinum adjacent to the descending thoracic aorta are  lymph nodes that measure less than 1 cm in size but these have increased in the  interval.  3. Please see above text    PET 2/10/20:   FINDINGS:  HEAD/NECK: No apparent foci of abnormal hypermetabolism. Cerebral evaluation is  limited by normal intense activity. CHEST: Mildly hypermetabolic left supraclavicular lymph node, maximum SUV 4.2. ABDOMEN/PELVIS: Mildly hypermetabolic portacaval, aortocaval, and left  para-aortic lymph nodes are noted, maximum SUV 5.5 of an aortocaval lymph node. Hypermetabolic right common iliac, right external iliac, right obturator, and  right inguinal lymph nodes.   SKELETON: No foci of abnormal hypermetabolism in the axial and visualized  appendicular skeleton. IMPRESSION: Mildly hypermetabolic left supraclavicular, abdominal,  retroperitoneal, and pelvic lymph nodes. Ch/abd/pelvis CT 5/7/20:  Lymph nodes in chest: There is no new mediastinal, hilar or axillary lymphadenopathy. Subcentimeter bilateral axillary lymph nodes are unchanged. Subcentimeter  posterior mediastinal lymph node abutting the descending thoracic aorta is  unchanged compared to prior CT dated January 2020. 1.3 cm x 1.1 cm left  supraclavicular lymph node is unchanged compared to prior CT dated January 2020. Lymph nodes in abdom/pelvis:  There is a prominent portacaval lymph node measuring approximately  1.6 cm x 2.3 cm, unchanged compared to prior CT dated January 2020. There are multiple prominent and mildly enlarged retroperitoneal lymph nodes, unchanged  compared to prior CT dated January 2020. For example, there is a left  para-aortic retroperitoneal lymph node measures 1.2 cm x 1.6 cm, unchanged  compared to prior CT dated January 2020. As an additional example, there is a 9  mm x 1.3 cm aortocaval lymph node, unchanged compared to prior CT dated January 2020. Right iliac lymph nodes are unchanged compared to prior CT dated January 2020. For example, the largest right iliac lymph node measures 1.1 cm x 1.4 cm,  unchanged compared to prior CT dated January 2020. Right internal obturator  lymph node is unchanged compared to prior CT dated January 2020) measuring  approximately 2.8 cm x 1.4 cm. Prominent right inguinal lymph nodes are not  significantly changed in size. For example, there is a 1 cm x 1.4 cm right  inguinal lymph node, unchanged compared to prior CT dated January 2020. IMPRESSION: No interval change. Discussed with radiology - the right RP LN near R renal artery is unchanged. The 4cm dimension is craniocaudal.    11/9/20 CT c/a/p:  FINDINGS:   LYMPH NODES: There is a prominent portacaval lymph node measuring approximately  1.8 x 2.0 cm, not significantly changed from prior measurements of 1.6 cm x 2.3  cm. There are  multiple prominent and mildly enlarged retroperitoneal lymph nodes, not  significantly changed from prior. For example, there is a left para-aortic  retroperitoneal lymph node that measures 1.1 x 1.7 cm, not significantly changed  from prior measurements of 1.2 x 1.6 and a 10 x 14 mm aortocaval lymph node, not  significantly changed from prior measurements of 9 x 13 mm. Right iliac lymph  nodes are little changed, though the largest has slightly increased in size  measuring 1.3 x 1.6 cm, previously 1.1 x 1.4 cm.  Right internal obturator is not  significantly changed measuring 1.1 x 2.7 cm, previously 1.3 x 3. 0 cm. Prominent  right inguinal lymph nodes are not significantly changed in size measuring 1.0 x  1.4 cm. IMPRESSION: No significant interval change apart from a single right common  iliac pelvic lymph nodes which shows slight increase in size by measurement from  1.1 x 1.4 cm to 1.3 x 1.6 cm.    11/18/21 CT ch/abd/pelvis:  LYMPH NODES: Extensive increased retroperitoneal and portacaval adenopathy. 3-67 aortocaval node 25 x 36 mm previously 18 x 9 mm.  3-71 left retroperitoneal node 31 x 26 mm on the previous exam 17 x 11 mm. Right lower quadrant mesenteric adenopathy 3-78 25 x 30 mm previously 14 x 10  mm. VASCULATURE: No aneurysm or dissection. REPRODUCTIVE ORGANS: Uterus and ovaries are unremarkable to  URINARY BLADDER: No mass or calculus. BONES: No destructive bone lesion. ABDOMINAL WALL: No mass or hernia. ADDITIONAL COMMENTS: N/A  IMPRESSION  Imaging findings are consistent with interval progression of disease. Extensive increased mesenteric and retroperitoneal lymphadenopathy with index  lesion measurements as described above. Assessment & Plan:   Carina Dos Santos is a 49 y.o. female with Depression comes in for evaluation and management of Follicular lymphoma. 1. Follicular lymphoma / Fatigue:   Stage III (based on L supraclav and abd/pelvis LAD). Normal LDH and no cytopenias or B symptoms present. Discussed that this is an indolent lymphoma which does not necessarily recommend treatment at time of diagnosis. Patients are observed with H&P and labs every 3-6 months. Treatment is for those who develop B symptoms, cytopenias or bulky disease (LN >7cm). At this time, I am unclear whether patient's symptoms are related to the disease. Furthermore, there are no \"bulky\" LNs > 7cm, but there is a 4cm LN in the right retroperitoneal LN region which if it increased significantly in size could risk of compression of renal vasculature.  Bone marrow biopsy shows < 1% monoclonal CD10 positive B cell population (seen on flow cytometry), which is considered negative for BM involvement. PET 2/2020 showed only mildly hypermetabolic uptake with max SUV of 5.5. Treatment options for Stage III, grade 1/2 FL include observation vs chemoimmunotherapy with BR regimen vs single agent Rituxan. I recommend continued observation at this time. Although prior CT imaging showed stable disease, most recent CT 11/2021 shows interval progression of mesenteric and retroperitoneal LAD. Pt also with worsening fatigue. I discussed that patient will likely need to start on treatment in near future, but not emergent. Will obtain PET first.   -- Repeat CBC, CMP, LDH, uric acid today  -- Repeat PET now for pre-treatment planning and to guide decision on which treatment option to pursue. -- Discussed treatment with BR regimen vs Rituxan weekly x 4  -- Prednisone 10mg/d x 7 days ok to use for moderate fatigue after PET completed. -- Return in 5 weeks to review PET and finalize treatment plan    2. Depression / ADHD: On Buproprion, Fluoxetine, Vyvanse. 3. Abdominal and back pain: Improved after cholecystectomy in Dec 2020. Likely unrelated to the lymphoma. We previously discussed that shon-menopausal state can cause GI upset as can factors such as diet, stress. Reviewed healthy options. 4. Intermittent pruritus: Unclear etiology and may be related to allergic rhinitis. However, pt had severe episode of pruritus several years ago. 5. Diarrhea alternating with constipation: May be related to lymphoma vs irritable bowel syndrome. 6. Bruising:  Likely due to SSRI. No significant bleeding episodes. Emotional well being: Pt is coping well with his/her disease and has excellent support. I appreciate the opportunity to participate in Ms. Cleve bowser.     Signed By: Kerrie Acharya MD     November 23, 2021

## 2021-11-29 ENCOUNTER — HOSPITAL ENCOUNTER (OUTPATIENT)
Dept: MRI IMAGING | Age: 51
Discharge: HOME | End: 2021-11-29
Attending: NURSE PRACTITIONER
Payer: COMMERCIAL

## 2021-11-29 DIAGNOSIS — R41.3 MEMORY LOSS OR IMPAIRMENT: ICD-10-CM

## 2021-11-29 DIAGNOSIS — C82.13 FOLLICULAR LYMPHOMA GRADE II OF INTRA-ABDOMINAL LYMPH NODES (HCC): ICD-10-CM

## 2021-11-29 PROCEDURE — A9576 INJ PROHANCE MULTIPACK: HCPCS | Performed by: NURSE PRACTITIONER

## 2021-11-29 PROCEDURE — 74011250636 HC RX REV CODE- 250/636: Performed by: NURSE PRACTITIONER

## 2021-11-29 PROCEDURE — 70553 MRI BRAIN STEM W/O & W/DYE: CPT

## 2021-11-29 RX ADMIN — GADOTERIDOL 15 ML: 279.3 INJECTION, SOLUTION INTRAVENOUS at 08:23

## 2021-11-29 NOTE — PROGRESS NOTES
Called patient and advised of MRI results per True Milo, NP. Patient voiced understanding. No further questions or concerns at this time.

## 2021-12-02 ENCOUNTER — OFFICE VISIT (OUTPATIENT)
Dept: ONCOLOGY | Age: 51
End: 2021-12-02
Payer: COMMERCIAL

## 2021-12-02 VITALS
HEART RATE: 86 BPM | DIASTOLIC BLOOD PRESSURE: 75 MMHG | RESPIRATION RATE: 16 BRPM | TEMPERATURE: 96.1 F | SYSTOLIC BLOOD PRESSURE: 122 MMHG | OXYGEN SATURATION: 94 % | WEIGHT: 158.6 LBS | BODY MASS INDEX: 24.89 KG/M2 | HEIGHT: 67 IN

## 2021-12-02 DIAGNOSIS — R10.84 GENERALIZED ABDOMINAL PAIN: ICD-10-CM

## 2021-12-02 DIAGNOSIS — C82.13 FOLLICULAR LYMPHOMA GRADE II OF INTRA-ABDOMINAL LYMPH NODES (HCC): Primary | ICD-10-CM

## 2021-12-02 DIAGNOSIS — R10.12 LEFT UPPER QUADRANT ABDOMINAL PAIN: ICD-10-CM

## 2021-12-02 DIAGNOSIS — C82.13 FOLLICULAR LYMPHOMA GRADE II OF INTRA-ABDOMINAL LYMPH NODES (HCC): ICD-10-CM

## 2021-12-02 DIAGNOSIS — K59.00 CONSTIPATION, UNSPECIFIED CONSTIPATION TYPE: ICD-10-CM

## 2021-12-02 DIAGNOSIS — R53.82 CHRONIC FATIGUE: ICD-10-CM

## 2021-12-02 LAB
ALBUMIN SERPL-MCNC: 4.2 G/DL (ref 3.5–5)
ALBUMIN/GLOB SERPL: 1.4 {RATIO} (ref 1.1–2.2)
ALP SERPL-CCNC: 103 U/L (ref 45–117)
ALT SERPL-CCNC: 56 U/L (ref 12–78)
ANION GAP SERPL CALC-SCNC: 7 MMOL/L (ref 5–15)
AST SERPL-CCNC: 52 U/L (ref 15–37)
BASOPHILS # BLD: 0.1 K/UL (ref 0–0.1)
BASOPHILS NFR BLD: 1 % (ref 0–1)
BILIRUB SERPL-MCNC: 0.6 MG/DL (ref 0.2–1)
BUN SERPL-MCNC: 11 MG/DL (ref 6–20)
BUN/CREAT SERPL: 14 (ref 12–20)
CALCIUM SERPL-MCNC: 9.3 MG/DL (ref 8.5–10.1)
CHLORIDE SERPL-SCNC: 103 MMOL/L (ref 97–108)
CO2 SERPL-SCNC: 26 MMOL/L (ref 21–32)
CREAT SERPL-MCNC: 0.8 MG/DL (ref 0.55–1.02)
DIFFERENTIAL METHOD BLD: NORMAL
EOSINOPHIL # BLD: 0.2 K/UL (ref 0–0.4)
EOSINOPHIL NFR BLD: 3 % (ref 0–7)
ERYTHROCYTE [DISTWIDTH] IN BLOOD BY AUTOMATED COUNT: 12.3 % (ref 11.5–14.5)
GLOBULIN SER CALC-MCNC: 3.1 G/DL (ref 2–4)
GLUCOSE SERPL-MCNC: 92 MG/DL (ref 65–100)
HCT VFR BLD AUTO: 42.6 % (ref 35–47)
HGB BLD-MCNC: 14 G/DL (ref 11.5–16)
IMM GRANULOCYTES # BLD AUTO: 0 K/UL (ref 0–0.04)
IMM GRANULOCYTES NFR BLD AUTO: 0 % (ref 0–0.5)
LDH SERPL L TO P-CCNC: 170 U/L (ref 81–246)
LYMPHOCYTES # BLD: 1.4 K/UL (ref 0.8–3.5)
LYMPHOCYTES NFR BLD: 20 % (ref 12–49)
MCH RBC QN AUTO: 32 PG (ref 26–34)
MCHC RBC AUTO-ENTMCNC: 32.9 G/DL (ref 30–36.5)
MCV RBC AUTO: 97.5 FL (ref 80–99)
MONOCYTES # BLD: 0.8 K/UL (ref 0–1)
MONOCYTES NFR BLD: 12 % (ref 5–13)
NEUTS SEG # BLD: 4.3 K/UL (ref 1.8–8)
NEUTS SEG NFR BLD: 64 % (ref 32–75)
NRBC # BLD: 0 K/UL (ref 0–0.01)
NRBC BLD-RTO: 0 PER 100 WBC
PLATELET # BLD AUTO: 351 K/UL (ref 150–400)
PMV BLD AUTO: 9.1 FL (ref 8.9–12.9)
POTASSIUM SERPL-SCNC: 4.4 MMOL/L (ref 3.5–5.1)
PROT SERPL-MCNC: 7.3 G/DL (ref 6.4–8.2)
RBC # BLD AUTO: 4.37 M/UL (ref 3.8–5.2)
SODIUM SERPL-SCNC: 136 MMOL/L (ref 136–145)
URATE SERPL-MCNC: 4.9 MG/DL (ref 2.6–6)
WBC # BLD AUTO: 6.8 K/UL (ref 3.6–11)

## 2021-12-02 PROCEDURE — 99214 OFFICE O/P EST MOD 30 MIN: CPT | Performed by: INTERNAL MEDICINE

## 2021-12-02 RX ORDER — CLONAZEPAM 0.5 MG/1
TABLET ORAL
COMMUNITY
Start: 2021-11-17

## 2021-12-02 RX ORDER — PREDNISONE 10 MG/1
10 TABLET ORAL
Qty: 7 TABLET | Refills: 0 | Status: SHIPPED | OUTPATIENT
Start: 2021-12-02 | End: 2022-01-06

## 2021-12-02 NOTE — PROGRESS NOTES
Chief Complaint   Patient presents with    Follow-up     Vamshi Martin is a pleasant 49year old woman who presents as a follow up for lymphoma.  She reports lower back pain

## 2021-12-02 NOTE — LETTER
12/2/2021    Patient: Parag Metzger   YOB: 1970   Date of Visit: 12/2/2021     Socorro Ojeda MD  [address] [facility]  [address]  [address] [place] [address]  [address] [facility] [address]    Dear Socorro Ojeda MD,      Thank you for referring Ms. Tomasa Schroeder to [facility] for evaluation. My notes for this consultation are attached. If you have questions, please do not hesitate to call me. I look forward to following your patient along with you.       Sincerely,    Kerrie Acharya MD

## 2021-12-10 ENCOUNTER — HOSPITAL ENCOUNTER (OUTPATIENT)
Dept: PET IMAGING | Age: 51
Discharge: HOME | End: 2021-12-10
Attending: INTERNAL MEDICINE
Payer: COMMERCIAL

## 2021-12-10 DIAGNOSIS — C82.13 FOLLICULAR LYMPHOMA GRADE II OF INTRA-ABDOMINAL LYMPH NODES (HCC): ICD-10-CM

## 2021-12-10 LAB
GLUCOSE BLD STRIP.AUTO-MCNC: 88 MG/DL (ref 65–117)
SERVICE CMNT-IMP: NORMAL

## 2021-12-10 PROCEDURE — A9552 F18 FDG: HCPCS

## 2021-12-10 RX ORDER — FLUDEOXYGLUCOSE F-18 200 MCI/ML
10 INJECTION INTRAVENOUS ONCE
Status: COMPLETED | OUTPATIENT
Start: 2021-12-10 | End: 2021-12-10

## 2021-12-10 RX ADMIN — FLUDEOXYGLUCOSE F-18 10 MILLICURIE: 200 INJECTION INTRAVENOUS at 07:10

## 2021-12-14 ENCOUNTER — OFFICE VISIT (OUTPATIENT)
Dept: ONCOLOGY | Age: 51
End: 2021-12-14
Payer: COMMERCIAL

## 2021-12-14 VITALS
TEMPERATURE: 96.1 F | BODY MASS INDEX: 24.27 KG/M2 | HEART RATE: 75 BPM | DIASTOLIC BLOOD PRESSURE: 81 MMHG | WEIGHT: 154.6 LBS | SYSTOLIC BLOOD PRESSURE: 127 MMHG | OXYGEN SATURATION: 98 % | HEIGHT: 67 IN

## 2021-12-14 DIAGNOSIS — R19.7 DIARRHEA, UNSPECIFIED TYPE: ICD-10-CM

## 2021-12-14 DIAGNOSIS — R10.12 LEFT UPPER QUADRANT ABDOMINAL PAIN: ICD-10-CM

## 2021-12-14 DIAGNOSIS — R53.82 CHRONIC FATIGUE: ICD-10-CM

## 2021-12-14 DIAGNOSIS — T45.1X5A CINV (CHEMOTHERAPY-INDUCED NAUSEA AND VOMITING): ICD-10-CM

## 2021-12-14 DIAGNOSIS — C82.13 FOLLICULAR LYMPHOMA GRADE II OF INTRA-ABDOMINAL LYMPH NODES (HCC): Primary | ICD-10-CM

## 2021-12-14 DIAGNOSIS — R11.2 CINV (CHEMOTHERAPY-INDUCED NAUSEA AND VOMITING): ICD-10-CM

## 2021-12-14 PROCEDURE — 99214 OFFICE O/P EST MOD 30 MIN: CPT | Performed by: INTERNAL MEDICINE

## 2021-12-14 RX ORDER — PROCHLORPERAZINE MALEATE 10 MG
10 TABLET ORAL
Qty: 30 TABLET | Refills: 1 | Status: SHIPPED | OUTPATIENT
Start: 2021-12-14 | End: 2022-10-11

## 2021-12-14 RX ORDER — DIPHENHYDRAMINE HYDROCHLORIDE 50 MG/ML
50 INJECTION, SOLUTION INTRAMUSCULAR; INTRAVENOUS ONCE
Status: CANCELLED
Start: 2022-01-06 | End: 2022-01-06

## 2021-12-14 RX ORDER — ONDANSETRON 2 MG/ML
8 INJECTION INTRAMUSCULAR; INTRAVENOUS AS NEEDED
Status: CANCELLED | OUTPATIENT
Start: 2022-01-07

## 2021-12-14 RX ORDER — ACETAMINOPHEN 325 MG/1
650 TABLET ORAL ONCE
Status: CANCELLED
Start: 2022-01-06 | End: 2022-01-06

## 2021-12-14 RX ORDER — DIPHENHYDRAMINE HYDROCHLORIDE 50 MG/ML
50 INJECTION, SOLUTION INTRAMUSCULAR; INTRAVENOUS AS NEEDED
Status: CANCELLED
Start: 2022-01-07

## 2021-12-14 RX ORDER — DIPHENHYDRAMINE HYDROCHLORIDE 50 MG/ML
25 INJECTION, SOLUTION INTRAMUSCULAR; INTRAVENOUS AS NEEDED
Status: CANCELLED
Start: 2022-01-07

## 2021-12-14 RX ORDER — PALONOSETRON 0.05 MG/ML
0.25 INJECTION, SOLUTION INTRAVENOUS ONCE
Status: CANCELLED | OUTPATIENT
Start: 2022-01-06 | End: 2022-01-06

## 2021-12-14 RX ORDER — SODIUM CHLORIDE 0.9 % (FLUSH) 0.9 %
10 SYRINGE (ML) INJECTION AS NEEDED
Status: CANCELLED | OUTPATIENT
Start: 2022-01-06

## 2021-12-14 RX ORDER — DIPHENHYDRAMINE HYDROCHLORIDE 50 MG/ML
50 INJECTION, SOLUTION INTRAMUSCULAR; INTRAVENOUS AS NEEDED
Status: CANCELLED
Start: 2022-01-06

## 2021-12-14 RX ORDER — SODIUM CHLORIDE 9 MG/ML
25 INJECTION, SOLUTION INTRAVENOUS CONTINUOUS
Status: CANCELLED | OUTPATIENT
Start: 2022-01-07

## 2021-12-14 RX ORDER — SODIUM CHLORIDE 9 MG/ML
10 INJECTION INTRAMUSCULAR; INTRAVENOUS; SUBCUTANEOUS AS NEEDED
Status: CANCELLED | OUTPATIENT
Start: 2022-01-07

## 2021-12-14 RX ORDER — ONDANSETRON HYDROCHLORIDE 8 MG/1
8 TABLET, FILM COATED ORAL
Qty: 30 TABLET | Refills: 1 | Status: SHIPPED | OUTPATIENT
Start: 2021-12-14 | End: 2022-10-11

## 2021-12-14 RX ORDER — DEXAMETHASONE SODIUM PHOSPHATE 100 MG/10ML
10 INJECTION INTRAMUSCULAR; INTRAVENOUS ONCE
Status: CANCELLED | OUTPATIENT
Start: 2022-01-06 | End: 2022-01-06

## 2021-12-14 RX ORDER — DEXAMETHASONE SODIUM PHOSPHATE 100 MG/10ML
10 INJECTION INTRAMUSCULAR; INTRAVENOUS ONCE
Status: CANCELLED | OUTPATIENT
Start: 2022-01-07 | End: 2022-01-07

## 2021-12-14 RX ORDER — ACETAMINOPHEN 325 MG/1
650 TABLET ORAL AS NEEDED
Status: CANCELLED
Start: 2022-01-06

## 2021-12-14 RX ORDER — EPINEPHRINE 1 MG/ML
0.3 INJECTION, SOLUTION, CONCENTRATE INTRAVENOUS AS NEEDED
Status: CANCELLED | OUTPATIENT
Start: 2022-01-07

## 2021-12-14 RX ORDER — EPINEPHRINE 1 MG/ML
0.3 INJECTION, SOLUTION, CONCENTRATE INTRAVENOUS AS NEEDED
Status: CANCELLED | OUTPATIENT
Start: 2022-01-06

## 2021-12-14 RX ORDER — ACETAMINOPHEN 325 MG/1
650 TABLET ORAL AS NEEDED
Status: CANCELLED
Start: 2022-01-07

## 2021-12-14 RX ORDER — HEPARIN 100 UNIT/ML
300-500 SYRINGE INTRAVENOUS AS NEEDED
Status: CANCELLED
Start: 2022-01-07

## 2021-12-14 RX ORDER — SODIUM CHLORIDE 9 MG/ML
10 INJECTION INTRAMUSCULAR; INTRAVENOUS; SUBCUTANEOUS AS NEEDED
Status: CANCELLED | OUTPATIENT
Start: 2022-01-06

## 2021-12-14 RX ORDER — DIPHENHYDRAMINE HYDROCHLORIDE 50 MG/ML
25 INJECTION, SOLUTION INTRAMUSCULAR; INTRAVENOUS AS NEEDED
Status: CANCELLED
Start: 2022-01-06

## 2021-12-14 RX ORDER — HYDROCORTISONE SODIUM SUCCINATE 100 MG/2ML
100 INJECTION, POWDER, FOR SOLUTION INTRAMUSCULAR; INTRAVENOUS AS NEEDED
Status: CANCELLED | OUTPATIENT
Start: 2022-01-07

## 2021-12-14 RX ORDER — HYDROCORTISONE SODIUM SUCCINATE 100 MG/2ML
100 INJECTION, POWDER, FOR SOLUTION INTRAMUSCULAR; INTRAVENOUS AS NEEDED
Status: CANCELLED | OUTPATIENT
Start: 2022-01-06

## 2021-12-14 RX ORDER — ALBUTEROL SULFATE 0.83 MG/ML
2.5 SOLUTION RESPIRATORY (INHALATION) AS NEEDED
Status: CANCELLED
Start: 2022-01-07

## 2021-12-14 RX ORDER — ALBUTEROL SULFATE 0.83 MG/ML
2.5 SOLUTION RESPIRATORY (INHALATION) AS NEEDED
Status: CANCELLED
Start: 2022-01-06

## 2021-12-14 RX ORDER — SODIUM CHLORIDE 9 MG/ML
25 INJECTION, SOLUTION INTRAVENOUS CONTINUOUS
Status: CANCELLED | OUTPATIENT
Start: 2022-01-06

## 2021-12-14 RX ORDER — HEPARIN 100 UNIT/ML
300-500 SYRINGE INTRAVENOUS AS NEEDED
Status: CANCELLED
Start: 2022-01-06

## 2021-12-14 RX ORDER — SODIUM CHLORIDE 0.9 % (FLUSH) 0.9 %
10 SYRINGE (ML) INJECTION AS NEEDED
Status: CANCELLED | OUTPATIENT
Start: 2022-01-07

## 2021-12-14 RX ORDER — ONDANSETRON 2 MG/ML
8 INJECTION INTRAMUSCULAR; INTRAVENOUS AS NEEDED
Status: CANCELLED | OUTPATIENT
Start: 2022-01-06

## 2021-12-14 NOTE — ONCOLOGY PATHWAY NOTE
START ON PATHWAY REGIMEN - Lymphoma and CLL    [id number]        Rituximab-[id number]       [facility] Julita Torres)     **Always confirm dose/schedule in your pharmacy ordering system**    Patient Characteristics:  Follicular Lymphoma, Grades 1, 2, and 3A, First Line, Stage III / IV, Symptomatic or Bulky Disease  Disease Type: Follicular Lymphoma, Grade 1, 2, or 3A  Disease Type: Not Applicable  Disease Type: Not Applicable  Line of Therapy: First Line  Disease Characteristics: Symptomatic or Bulky Disease  Intent of Therapy:  Non-Curative / Palliative Intent, Discussed with Patient

## 2021-12-14 NOTE — PROGRESS NOTES
Asif Ordonez is a 49 y.o. female here for follow up of follicular lymphoma. Patient with complaints of lower back, abdominal, and neck pain, rates as a 4 out of 10.

## 2021-12-14 NOTE — PROGRESS NOTES
[id number] [facility] Oncology at [facility]  [phone]    Hematology / Oncology Established Visit    Reason for Visit:   Asif Ordonez is a 49 y.o. female who is seen for follow up of lymphoma. PCP is Dr. Ewa Mac. Hematology Oncology Treatment History:     Diagnosis: Follicular lymphoma    Stage: III; FLIPI2 low risk    Pathology:   -1/20/20 Right inguinal LN excision: Follicular lymphoma, grade 1-2, follicular pattern, XH20 positive. Comment:   The LN specimen demonstrates increased follicles, lacking normal germinal centers. IHC markers are performed with good controls. Tumor cells are positive for CD20 and BCL2. CD3 marks background T cells. Cyclin D1 is negative in the lymphoid population. CD21 highlights intact dendritic meshworks. Flow cytometry shows positive expression of CD10 and kappa LC restriction. Ki-67 is pending. Focally there are a few follicles with greater than fifteen centroblasts per HPF; however the majority of follicles have less than 15 centroblasts/HPF, consistent with grade 1-2. Diffuse areas are not seen.    -2/5/20 Bone marrow biopsy: Normocellular marrow with multilineage hematopoiesis and <1% involvement by a CD10 positive B-cell lymphoproliferative disorder. Negative for immunophenotypic or morphologic evidence of dysplasia no increase in blasts. Comment   Flow cytometric analysis reveals a small population of CD10 positive clonal B cells compatible with involvement by the patient's recently diagnosed follicular lymphoma. FISH studies are pending and will be reported. Prior Treatment: None    Current Treatment: Observation    History of Present Illness:   Asif Ordonez is a 49 y.o. female who comes in for evaluation of Follicular lymphoma. She states she has had vague symptoms for several years as follows. Pt reports h/o pruritus which started 6 yrs ago, which finally subsided with Zyrtec.  She saw a hematologist at that time, who told her symptoms might surface as a blood disorder in the future. In 2016, pt went to see her Gyn (Dr. Zackary Ott) for abdominal cramping. Workup normal at time time. In summer of 2019, pt developed fatigue as well as same of the chronic pain in abdominal area. In fall of 2019, she developed memory issues with work. She went to see her Psychiatrist due to these symptoms and switched her from generic Wellbutrin to brand name Wellbutrin. No improvement after 3-4 weeks. She was okay with sleeping during that time, but continued to have severe fatigue out of character for her. She went to see her PCP and described a right inguinal LN enlargement. She went to see a surgeon, Dr. Renetta Estrella. Ultrasound of that region did demonstrate an enlarged LN. FNA was inconclusive. Core needle biopsy was suspicious for B cell lymphoma. Excisional LN biopsy 9/73/08 revealed follicular lymphoma. CT of chest/abd/pelvis was notable for retroperitoneal, pelvic and portacaval LAD. Pt also reports left hip pain, left foot tingling. No fevers, chills, weight loss. She does have occasional sweats more notable a few months ago, but not drenching or nightly. Patient underwent staging bone marrow biopsy and PET scan. She did see [facility] for a 2nd opinion. She was told her GI symptoms are not related to lymphoma. Interval History:  She is seen for f/u of lymphoma. Pt feels very fatigued. Describes crampy abdominal pain and back pain. No recent infections. Feels her neck is more swollen at times. Accompanied by her  today. PMHx: Chronic pain, Depression, Fibrocystic breast  PSurgHx: None aside from bilat breast biopsy and wisdom teeth extraction  SHx: Quit smoking 1/2000. Drinks 5 glasses of wine per week. FHx: Mother had breast cancer age 61. Father had CVA. Review of Systems: A complete review of systems was obtained, negative except as described above.     Physical Exam:     Visit Vitals  /81 (BP 1 Location: Right arm, BP Patient Position: Sitting)   Pulse 75   Temp (!) 96.1 °F (35.6 °C) (Temporal)   Ht 5' 7\" (1.702 m)   Wt 154 lb 9.6 oz (70.1 kg)   SpO2 98%   BMI 24.21 kg/m²     ECOG PS: 0  General: no distress  Eyes: anicteric sclerae  HENT: oropharynx clear  Neck: supple  Lymphatic: no cervical, axillary adenopathy; has mild R inguinal LN and L supraclavicular LN noted. Respiratory: normal respiratory effort  CV: no peripheral edema  GI: soft, nontender, nondistended, no masses  Skin: no rashes; no ecchymoses; no petechiae        Results:     Lab Results   Component Value Date/Time    WBC 6.8 12/02/2021 09:23 AM    HGB 14.0 12/02/2021 09:23 AM    HCT 42.6 12/02/2021 09:23 AM    PLATELET 027 09/97/8893 09:23 AM    MCV 97.5 12/02/2021 09:23 AM    ABS. NEUTROPHILS 4.3 12/02/2021 09:23 AM     Lab Results   Component Value Date/Time    Sodium 136 12/02/2021 09:23 AM    Potassium 4.4 12/02/2021 09:23 AM    Chloride 103 12/02/2021 09:23 AM    CO2 26 12/02/2021 09:23 AM    Glucose 92 12/02/2021 09:23 AM    BUN 11 12/02/2021 09:23 AM    Creatinine 0.80 12/02/2021 09:23 AM    GFR est AA >60 12/02/2021 09:23 AM    GFR est non-AA >60 12/02/2021 09:23 AM    Calcium 9.3 12/02/2021 09:23 AM    Glucose (POC) 88 12/10/2021 07:07 AM     Lab Results   Component Value Date/Time    Bilirubin, total 0.6 12/02/2021 09:23 AM    ALT (SGPT) 56 12/02/2021 09:23 AM    Alk. phosphatase 103 12/02/2021 09:23 AM    Protein, total 7.3 12/02/2021 09:23 AM    Albumin 4.2 12/02/2021 09:23 AM    Globulin 3.1 12/02/2021 09:23 AM     No results found for: IRON, FE, TIBC, IBCT, PSAT, FERR    No results found for: B12LT, FOL, RBCF  Lab Results   Component Value Date/Time    TSH 1.830 09/25/2019 12:45 PM     Lab Results   Component Value Date/Time    Hepatitis B surface Ab <3.10 01/24/2020 08:13 AM     12/2/21 LD: 170    Imaging:     Chest CT  1/24/20:  FINDINGS:  The normal-sized axillary lymph nodes are unchanged. THYROID: No nodule.   MEDIASTINUM: No mass or lymphadenopathy. MARCE: No mass or lymphadenopathy. THORACIC AORTA: No aneurysm. MAIN PULMONARY ARTERY: Normal in caliber. TRACHEA/BRONCHI: Patent. ESOPHAGUS: No wall thickening or dilatation. HEART: Normal in size. PLEURA: No effusion or pneumothorax. LUNGS: Laterally in the left lower lobe on image 53 is a 2 to 3 mm pulmonary  nodule which is unchanged when compared with the examination of 6/27/2017. This  is a benign nodule and not significant. .  There are normal size lymph nodes adjacent to the descending thoracic aorta and  paraspinal region which have increased in size but remain normal in size. .  Abdomen and pelvis: There has been interval development of adenopathy. In the portacaval space there  is a lymph node that measures 17 mm in short axis. There are retroperitoneal  lymph nodes that are enlarged as well. The largest lymph node in the  retroperitoneum is posterior to the inferior vena cava beginning at the level of  the right renal artery this extends inferiorly. This measures 2.4 x 1.0 x 4.0  cm. There are also enlarged lymph nodes in the left aortic region largest  measuring 12 mm in short axis. There are also lymph nodes posterior to the head of the pancreas and anterior to  the inferior vena cava which are enlarged the largest measures 10 mm in short  axis  There are enlarged lymph nodes in the interaortocaval space. Enlarged lymph  nodes along the right common iliac artery largest measuring approximately 12 mm  in short axis. There are enlarged lymph nodes in the right external iliac chain  largest measuring 9 mm. There is an enlarged lymph node along the right pelvic sidewall measuring 2.5 x  1.3 cm. There are postsurgical changes in the right inguinal region with one  tiny locule of air. There are residual lymph nodes in the right inguinal largest  measuring 10 mm. .  The uterus images normally. There is a 1 cm cyst in the right ovary. There is no free fluid.   Review of the bone windows reveals no destructive lesions. The liver and gallbladder are unremarkable. The spleen is normal in size and  configuration. The pancreas and adrenal glands and kidneys are unremarkable. The aorta and inferior vena cava are unremarkable. IMPRESSION:  1. There is is adenopathy as described. There is adenopathy in the right  inguinal region, along the right pelvic sidewall, along the right iliac chain,  bilateral retroperitoneum, and in the portacaval space as well as posterior to  the pancreas head. 2. In the posterior mediastinum adjacent to the descending thoracic aorta are  lymph nodes that measure less than 1 cm in size but these have increased in the  interval.  3. Please see above text    PET 2/10/20:   FINDINGS:  HEAD/NECK: No apparent foci of abnormal hypermetabolism. Cerebral evaluation is  limited by normal intense activity. CHEST: Mildly hypermetabolic left supraclavicular lymph node, maximum SUV 4.2. ABDOMEN/PELVIS: Mildly hypermetabolic portacaval, aortocaval, and left  para-aortic lymph nodes are noted, maximum SUV 5.5 of an aortocaval lymph node. Hypermetabolic right common iliac, right external iliac, right obturator, and  right inguinal lymph nodes.   SKELETON: No foci of abnormal hypermetabolism in the axial and visualized  appendicular skeleton. IMPRESSION: Mildly hypermetabolic left supraclavicular, abdominal,  retroperitoneal, and pelvic lymph nodes. Ch/abd/pelvis CT 5/7/20:  Lymph nodes in chest: There is no new mediastinal, hilar or axillary lymphadenopathy. Subcentimeter bilateral axillary lymph nodes are unchanged. Subcentimeter  posterior mediastinal lymph node abutting the descending thoracic aorta is  unchanged compared to prior CT dated January 2020. 1.3 cm x 1.1 cm left  supraclavicular lymph node is unchanged compared to prior CT dated January 2020. Lymph nodes in abdom/pelvis:  There is a prominent portacaval lymph node measuring approximately  1.6 cm x 2.3 cm, unchanged compared to prior CT dated January 2020. There are multiple prominent and mildly enlarged retroperitoneal lymph nodes, unchanged  compared to prior CT dated January 2020. For example, there is a left  para-aortic retroperitoneal lymph node measures 1.2 cm x 1.6 cm, unchanged  compared to prior CT dated January 2020. As an additional example, there is a 9  mm x 1.3 cm aortocaval lymph node, unchanged compared to prior CT dated January 2020. Right iliac lymph nodes are unchanged compared to prior CT dated January 2020. For example, the largest right iliac lymph node measures 1.1 cm x 1.4 cm,  unchanged compared to prior CT dated January 2020. Right internal obturator  lymph node is unchanged compared to prior CT dated January 2020) measuring  approximately 2.8 cm x 1.4 cm. Prominent right inguinal lymph nodes are not  significantly changed in size. For example, there is a 1 cm x 1.4 cm right  inguinal lymph node, unchanged compared to prior CT dated January 2020. IMPRESSION: No interval change. Discussed with radiology - the right RP LN near R renal artery is unchanged. The 4cm dimension is craniocaudal.    11/9/20 CT c/a/p:  FINDINGS:   LYMPH NODES: There is a prominent portacaval lymph node measuring approximately  1.8 x 2.0 cm, not significantly changed from prior measurements of 1.6 cm x 2.3  cm. There are  multiple prominent and mildly enlarged retroperitoneal lymph nodes, not  significantly changed from prior. For example, there is a left para-aortic  retroperitoneal lymph node that measures 1.1 x 1.7 cm, not significantly changed  from prior measurements of 1.2 x 1.6 and a 10 x 14 mm aortocaval lymph node, not  significantly changed from prior measurements of 9 x 13 mm. Right iliac lymph  nodes are little changed, though the largest has slightly increased in size  measuring 1.3 x 1.6 cm, previously 1.1 x 1.4 cm.  Right internal obturator is not  significantly changed measuring 1.1 x 2.7 cm, previously 1.3 x 3. 0 cm. Prominent  right inguinal lymph nodes are not significantly changed in size measuring 1.0 x  1.4 cm. IMPRESSION: No significant interval change apart from a single right common  iliac pelvic lymph nodes which shows slight increase in size by measurement from  1.1 x 1.4 cm to 1.3 x 1.6 cm.    11/18/21 CT ch/abd/pelvis:  LYMPH NODES: Extensive increased retroperitoneal and portacaval adenopathy. 3-67 aortocaval node 25 x 36 mm previously 18 x 9 mm.  3-71 left retroperitoneal node 31 x 26 mm on the previous exam 17 x 11 mm. Right lower quadrant mesenteric adenopathy 3-78 25 x 30 mm previously 14 x 10  mm. VASCULATURE: No aneurysm or dissection. REPRODUCTIVE ORGANS: Uterus and ovaries are unremarkable to  URINARY BLADDER: No mass or calculus. BONES: No destructive bone lesion. ABDOMINAL WALL: No mass or hernia. ADDITIONAL COMMENTS: N/A  IMPRESSION  Imaging findings are consistent with interval progression of disease. Extensive increased mesenteric and retroperitoneal lymphadenopathy with index  lesion measurements as described above. 12/10/21 PET:  FINDINGS:  HEAD/NECK: No apparent foci of abnormal hypermetabolism. Cerebral evaluation is  limited by normal intense activity. CHEST: Left supraclavicular adenopathy SUV 7.5, previous 4.2. New left internal  mammary SUV 3.3. Right diaphragmatic node with SUV 5.  ABDOMEN/PELVIS: Extensive new hypermetabolic and enlarged nodes: upper abdomen  (SUV 9), mesentery, retrocrural, retroperitoneal, right pelvic (SUV 6) and right  groin (SUV 4.5). A previously measured left aortic node is SUV 6.4, previous  5.5. No splenic activity. SKELETON: No foci of abnormal hypermetabolism in the axial and visualized  appendicular skeleton. IMPRESSION  New/progressed multi level bill disease (Deauville 5).    Each FDG-avid (or previously FDG avid) lesion is rated independently.   Reference values:  Mediastinal blood pool: 1.9 SUV  Liver (background): 2.2 SUV      Assessment & Plan:   Kim Strong is a 49 y.o. female with Depression comes in for evaluation and management of Follicular lymphoma. 1. Follicular lymphoma / Fatigue:   Stage III (based on L supraclav and abd/pelvis LAD). Normal LDH and no cytopenias or B symptoms present. Discussed that this is an indolent lymphoma which does not necessarily recommend treatment at time of diagnosis. Patients are observed with H&P and labs every 3-6 months. Treatment is for those who develop B symptoms, cytopenias or bulky disease (LN >7cm). At this time, I am unclear whether patient's symptoms are related to the disease. Furthermore, there are no \"bulky\" LNs > 7cm, but there is a 4cm LN in the right retroperitoneal LN region which if it increased significantly in size could risk of compression of renal vasculature. Bone marrow biopsy shows < 1% monoclonal CD10 positive B cell population (seen on flow cytometry), which is considered negative for BM involvement. PET 2/2020 showed only mildly hypermetabolic uptake with max SUV of 5.5. Previously discussed that treatment options for Stage III, grade 1/2 FL include observation vs chemoimmunotherapy with BR regimen vs single agent Rituxan. Now with CT 11/2021 and 12/2021 PET imaging that show interval progression of mesenteric and retroperitoneal LAD. Pt also with worsening fatigue. At this time, I recommend treatment with BR regimen given local GI symptoms related to mesenteric LAD and fatigue. We discussed the risks and benefits of R-Bendamustine chemotherapy. The potential side effects include, but are not limited to: nausea, vomiting, diarrhea, constipation, flu-like symptoms, infusion reaction, allergic reaction, flushing, taste changes, increased risk of infection, anemia, fatigue, alopecia, mucositis, myelosuppression, infertility and rarely, death. The patient has consented to begin therapy. Supportive medications include: ondansetron, prochlorperazine.    -- Discussed treatment with BR regimen vs Rituxan weekly x 4  -- Prednisone 10mg/d x 7 days ok to use for moderate fatigue after PET completed. -- Return in on Jan 6 for C1 of BR (2 day regimen)    2. Depression / ADHD: On Buproprion, Fluoxetine, Vyvanse. 3. Abdominal and back pain: Improved after cholecystectomy in Dec 2020. Likely unrelated to the lymphoma. We previously discussed that shon-menopausal state can cause GI upset as can factors such as diet, stress. Reviewed healthy options. 4. H/o pruritus: Unclear etiology and may be related to allergic rhinitis. However, pt had severe episode of pruritus several years ago. 5. Diarrhea alternating with constipation: May be related to lymphoma vs irritable bowel syndrome. 6. Bruising:  Likely due to SSRI. No significant bleeding episodes. 7. Vaccine counseling:  Had Covid booster and flu shot 12/14/21. Emotional well being: Pt is coping well with his/her disease and has excellent support. I appreciate the opportunity to participate in Ms. Virgie Bradshaw care. I personally provided the service today.     Signed By: Moni Benton MD     December 14, 2021

## 2021-12-14 NOTE — LETTER
12/14/2021    Patient: Adrien Chaney   YOB: 1970   Date of Visit: 12/14/2021     Cedric Shelby MD  [address] [facility]  [address]  [address] [place] [address]  [employer] [employer]    Dear Cedric Shelby MD,      Thank you for referring Ms. Tang Caceres to [facility] for evaluation. My notes for this consultation are attached. If you have questions, please do not hesitate to call me. I look forward to following your patient along with you.       Sincerely,    Yaakov Wan MD

## 2022-01-05 NOTE — PROGRESS NOTES
[id number] [facility] at [facility]  [phone]    Hematology / Oncology Established Visit    Reason for Visit:   Diego Chaudhary is a 49 y.o. female who is seen for follow up of lymphoma. PCP is Dr. Echo Rivera. Hematology Oncology Treatment History:     Diagnosis: Follicular lymphoma    Stage: III; FLIPI2 low risk    Pathology:   -1/20/20 Right inguinal LN excision: Follicular lymphoma, grade 1-2, follicular pattern, XD81 positive. Comment:   The LN specimen demonstrates increased follicles, lacking normal germinal centers. IHC markers are performed with good controls. Tumor cells are positive for CD20 and BCL2. CD3 marks background T cells. Cyclin D1 is negative in the lymphoid population. CD21 highlights intact dendritic meshworks. Flow cytometry shows positive expression of CD10 and kappa LC restriction. Ki-67 is pending. Focally there are a few follicles with greater than fifteen centroblasts per HPF; however the majority of follicles have less than 15 centroblasts/HPF, consistent with grade 1-2. Diffuse areas are not seen.    -2/5/20 Bone marrow biopsy: Normocellular marrow with multilineage hematopoiesis and <1% involvement by a CD10 positive B-cell lymphoproliferative disorder. Negative for immunophenotypic or morphologic evidence of dysplasia no increase in blasts. Comment   Flow cytometric analysis reveals a small population of CD10 positive clonal B cells compatible with involvement by the patient's recently diagnosed follicular lymphoma. FISH studies are pending and will be reported. Prior Treatment: None    Current Treatment: Observation    History of Present Illness:   Diego Chaudhary is a 49 y.o. female who comes in for evaluation of Follicular lymphoma. She states she has had vague symptoms for several years as follows. Pt reports h/o pruritus which started 6 yrs ago, which finally subsided with Zyrtec.  She saw a hematologist at that time, who told her symptoms might surface as a blood disorder in the future. In 2016, pt went to see her Gyn (Dr. Natalia Dong) for abdominal cramping. Workup normal at time time. In summer of 2019, pt developed fatigue as well as same of the chronic pain in abdominal area. In fall of 2019, she developed memory issues with work. She went to see her Psychiatrist due to these symptoms and switched her from generic Wellbutrin to brand name Wellbutrin. No improvement after 3-4 weeks. She was okay with sleeping during that time, but continued to have severe fatigue out of character for her. She went to see her PCP and described a right inguinal LN enlargement. She went to see a surgeon, Dr. Jed Mancia. Ultrasound of that region did demonstrate an enlarged LN. FNA was inconclusive. Core needle biopsy was suspicious for B cell lymphoma. Excisional LN biopsy 3/57/61 revealed follicular lymphoma. CT of chest/abd/pelvis was notable for retroperitoneal, pelvic and portacaval LAD. Pt also reports left hip pain, left foot tingling. No fevers, chills, weight loss. She does have occasional sweats more notable a few months ago, but not drenching or nightly. Patient underwent staging bone marrow biopsy and PET scan. She did see [facility], [facility] for a 2nd opinion. She was told her GI symptoms are not related to lymphoma. Interval History:  She is seen for f/u of lymphoma. Describes crampy abdominal pain and back pain. Reports increased nausea - no vomiting and still able to keep food down. Reports increased fatigue. Having intermittent SOB - none in the past 2 days. Constipation for the past 2 weeks - has stool softeners at home. Eating and hydrating well. No fevers, chills or recent infections. Having night sweats. Reports slight neck swelling and pressure - not worse since last visit. Alone today.        PMHx: Chronic pain, Depression, Fibrocystic breast  PSurgHx: None aside from bilat breast biopsy and wisdom teeth extraction  SHx: Quit smoking 1/2000. Drinks 5 glasses of wine per week. FHx: Mother had breast cancer age 61. Father had CVA. Review of Systems: A complete review of systems was obtained, negative except as described above. Physical Exam:     Visit Vitals  /71   Pulse 91   Temp 97.4 °F (36.3 °C) (Temporal)   Resp 18   Ht 5' 7\" (1.702 m)   Wt 156 lb (70.8 kg)   SpO2 94%   BMI 24.43 kg/m²     ECOG PS: 0  General: no distress  Eyes: anicteric sclerae  HENT: oropharynx clear  Neck: supple  Lymphatic: no cervical, axillary adenopathy; has mild R inguinal LN and L supraclavicular LN noted. Respiratory: normal respiratory effort  CV: no peripheral edema  GI: soft, nontender, nondistended, no masses  Skin: no rashes; no ecchymoses; no petechiae      Results:     Lab Results   Component Value Date/Time    WBC 6.8 01/06/2022 09:13 AM    HGB 14.6 01/06/2022 09:13 AM    HCT 43.6 01/06/2022 09:13 AM    PLATELET 640 93/31/5771 09:13 AM    MCV 96.9 01/06/2022 09:13 AM    ABS. NEUTROPHILS 4.3 01/06/2022 09:13 AM     Lab Results   Component Value Date/Time    Sodium 136 12/02/2021 09:23 AM    Potassium 4.4 12/02/2021 09:23 AM    Chloride 103 12/02/2021 09:23 AM    CO2 26 12/02/2021 09:23 AM    Glucose 92 12/02/2021 09:23 AM    BUN 11 12/02/2021 09:23 AM    Creatinine 0.80 12/02/2021 09:23 AM    GFR est AA >60 12/02/2021 09:23 AM    GFR est non-AA >60 12/02/2021 09:23 AM    Calcium 9.3 12/02/2021 09:23 AM    Glucose (POC) 88 12/10/2021 07:07 AM     Lab Results   Component Value Date/Time    Bilirubin, total 0.6 12/02/2021 09:23 AM    ALT (SGPT) 56 12/02/2021 09:23 AM    Alk.  phosphatase 103 12/02/2021 09:23 AM    Protein, total 7.3 12/02/2021 09:23 AM    Albumin 4.2 12/02/2021 09:23 AM    Globulin 3.1 12/02/2021 09:23 AM     No results found for: IRON, FE, TIBC, IBCT, PSAT, FERR    No results found for: B12LT, FOL, RBCF  Lab Results   Component Value Date/Time    TSH 1.830 09/25/2019 12:45 PM     Lab Results   Component Value Date/Time Hepatitis B surface Ab <3.10 01/24/2020 08:13 AM     12/2/21 LD: 170    Imaging:     Chest CT  1/24/20:  FINDINGS:  The normal-sized axillary lymph nodes are unchanged. THYROID: No nodule. MEDIASTINUM: No mass or lymphadenopathy. MARCE: No mass or lymphadenopathy. THORACIC AORTA: No aneurysm. MAIN PULMONARY ARTERY: Normal in caliber. TRACHEA/BRONCHI: Patent. ESOPHAGUS: No wall thickening or dilatation. HEART: Normal in size. PLEURA: No effusion or pneumothorax. LUNGS: Laterally in the left lower lobe on image 53 is a 2 to 3 mm pulmonary  nodule which is unchanged when compared with the examination of 6/27/2017. This  is a benign nodule and not significant. .  There are normal size lymph nodes adjacent to the descending thoracic aorta and  paraspinal region which have increased in size but remain normal in size. .  Abdomen and pelvis: There has been interval development of adenopathy. In the portacaval space there  is a lymph node that measures 17 mm in short axis. There are retroperitoneal  lymph nodes that are enlarged as well. The largest lymph node in the  retroperitoneum is posterior to the inferior vena cava beginning at the level of  the right renal artery this extends inferiorly. This measures 2.4 x 1.0 x 4.0  cm. There are also enlarged lymph nodes in the left aortic region largest  measuring 12 mm in short axis. There are also lymph nodes posterior to the head of the pancreas and anterior to  the inferior vena cava which are enlarged the largest measures 10 mm in short  axis  There are enlarged lymph nodes in the interaortocaval space. Enlarged lymph  nodes along the right common iliac artery largest measuring approximately 12 mm  in short axis. There are enlarged lymph nodes in the right external iliac chain  largest measuring 9 mm. There is an enlarged lymph node along the right pelvic sidewall measuring 2.5 x  1.3 cm.  There are postsurgical changes in the right inguinal region with one  tiny locule of air. There are residual lymph nodes in the right inguinal largest  measuring 10 mm. .  The uterus images normally. There is a 1 cm cyst in the right ovary. There is no free fluid. Review of the bone windows reveals no destructive lesions. The liver and gallbladder are unremarkable. The spleen is normal in size and  configuration. The pancreas and adrenal glands and kidneys are unremarkable. The aorta and inferior vena cava are unremarkable. IMPRESSION:  1. There is is adenopathy as described. There is adenopathy in the right  inguinal region, along the right pelvic sidewall, along the right iliac chain,  bilateral retroperitoneum, and in the portacaval space as well as posterior to  the pancreas head. 2. In the posterior mediastinum adjacent to the descending thoracic aorta are  lymph nodes that measure less than 1 cm in size but these have increased in the  interval.  3. Please see above text    PET 2/10/20:   FINDINGS:  HEAD/NECK: No apparent foci of abnormal hypermetabolism. Cerebral evaluation is  limited by normal intense activity. CHEST: Mildly hypermetabolic left supraclavicular lymph node, maximum SUV 4.2. ABDOMEN/PELVIS: Mildly hypermetabolic portacaval, aortocaval, and left  para-aortic lymph nodes are noted, maximum SUV 5.5 of an aortocaval lymph node. Hypermetabolic right common iliac, right external iliac, right obturator, and  right inguinal lymph nodes.   SKELETON: No foci of abnormal hypermetabolism in the axial and visualized  appendicular skeleton. IMPRESSION: Mildly hypermetabolic left supraclavicular, abdominal,  retroperitoneal, and pelvic lymph nodes. Ch/abd/pelvis CT 5/7/20:  Lymph nodes in chest: There is no new mediastinal, hilar or axillary lymphadenopathy. Subcentimeter bilateral axillary lymph nodes are unchanged. Subcentimeter  posterior mediastinal lymph node abutting the descending thoracic aorta is  unchanged compared to prior CT dated January 2020.  1.3 cm x 1.1 cm left  supraclavicular lymph node is unchanged compared to prior CT dated January 2020. Lymph nodes in abdom/pelvis: There is a prominent portacaval lymph node measuring approximately  1.6 cm x 2.3 cm, unchanged compared to prior CT dated January 2020. There are multiple prominent and mildly enlarged retroperitoneal lymph nodes, unchanged  compared to prior CT dated January 2020. For example, there is a left  para-aortic retroperitoneal lymph node measures 1.2 cm x 1.6 cm, unchanged  compared to prior CT dated January 2020. As an additional example, there is a 9  mm x 1.3 cm aortocaval lymph node, unchanged compared to prior CT dated January 2020. Right iliac lymph nodes are unchanged compared to prior CT dated January 2020. For example, the largest right iliac lymph node measures 1.1 cm x 1.4 cm,  unchanged compared to prior CT dated January 2020. Right internal obturator  lymph node is unchanged compared to prior CT dated January 2020) measuring  approximately 2.8 cm x 1.4 cm. Prominent right inguinal lymph nodes are not  significantly changed in size. For example, there is a 1 cm x 1.4 cm right  inguinal lymph node, unchanged compared to prior CT dated January 2020. IMPRESSION: No interval change. Discussed with radiology - the right RP LN near R renal artery is unchanged. The 4cm dimension is craniocaudal.    11/9/20 CT c/a/p:  FINDINGS:   LYMPH NODES: There is a prominent portacaval lymph node measuring approximately  1.8 x 2.0 cm, not significantly changed from prior measurements of 1.6 cm x 2.3  cm. There are  multiple prominent and mildly enlarged retroperitoneal lymph nodes, not  significantly changed from prior. For example, there is a left para-aortic  retroperitoneal lymph node that measures 1.1 x 1.7 cm, not significantly changed  from prior measurements of 1.2 x 1.6 and a 10 x 14 mm aortocaval lymph node, not  significantly changed from prior measurements of 9 x 13 mm.  Right iliac lymph  nodes are little changed, though the largest has slightly increased in size  measuring 1.3 x 1.6 cm, previously 1.1 x 1.4 cm. Right internal obturator is not  significantly changed measuring 1.1 x 2.7 cm, previously 1.3 x 3.0 cm. Prominent  right inguinal lymph nodes are not significantly changed in size measuring 1.0 x  1.4 cm. IMPRESSION: No significant interval change apart from a single right common  iliac pelvic lymph nodes which shows slight increase in size by measurement from  1.1 x 1.4 cm to 1.3 x 1.6 cm.    11/18/21 CT ch/abd/pelvis:  LYMPH NODES: Extensive increased retroperitoneal and portacaval adenopathy. 3-67 aortocaval node 25 x 36 mm previously 18 x 9 mm.  3-71 left retroperitoneal node 31 x 26 mm on the previous exam 17 x 11 mm. Right lower quadrant mesenteric adenopathy 3-78 25 x 30 mm previously 14 x 10  mm. VASCULATURE: No aneurysm or dissection. REPRODUCTIVE ORGANS: Uterus and ovaries are unremarkable to  URINARY BLADDER: No mass or calculus. BONES: No destructive bone lesion. ABDOMINAL WALL: No mass or hernia. ADDITIONAL COMMENTS: N/A  IMPRESSION  Imaging findings are consistent with interval progression of disease. Extensive increased mesenteric and retroperitoneal lymphadenopathy with index  lesion measurements as described above. 12/10/21 PET:  FINDINGS:  HEAD/NECK: No apparent foci of abnormal hypermetabolism. Cerebral evaluation is  limited by normal intense activity. CHEST: Left supraclavicular adenopathy SUV 7.5, previous 4.2. New left internal  mammary SUV 3.3. Right diaphragmatic node with SUV 5.  ABDOMEN/PELVIS: Extensive new hypermetabolic and enlarged nodes: upper abdomen  (SUV 9), mesentery, retrocrural, retroperitoneal, right pelvic (SUV 6) and right  groin (SUV 4.5). A previously measured left aortic node is SUV 6.4, previous  5.5. No splenic activity.   SKELETON: No foci of abnormal hypermetabolism in the axial and visualized  appendicular skeleton. IMPRESSION  New/progressed multi level bill disease (Deauville 5).    Each FDG-avid (or previously FDG avid) lesion is rated independently. Reference values:  Mediastinal blood pool: 1.9 SUV  Liver (background): 2.2 SUV      Assessment & Plan:   Wisam Gonzalez is a 49 y.o. female with Depression comes in for evaluation and management of Follicular lymphoma. 1. Follicular lymphoma / Fatigue:   Stage III (based on L supraclav and abd/pelvis LAD). Normal LDH and no cytopenias or B symptoms present. Discussed that this is an indolent lymphoma which does not necessarily recommend treatment at time of diagnosis. Patients are observed with H&P and labs every 3-6 months. Treatment is for those who develop B symptoms, cytopenias or bulky disease (LN >7cm). At this time, I am unclear whether patient's symptoms are related to the disease. Furthermore, there are no \"bulky\" LNs > 7cm, but there is a 4cm LN in the right retroperitoneal LN region which if it increased significantly in size could risk of compression of renal vasculature. Bone marrow biopsy shows < 1% monoclonal CD10 positive B cell population (seen on flow cytometry), which is considered negative for BM involvement. PET 2/2020 showed only mildly hypermetabolic uptake with max SUV of 5.5. Previously discussed that treatment options for Stage III, grade 1/2 FL include observation vs chemoimmunotherapy with BR regimen vs single agent Rituxan. Now with CT 11/2021 and 12/2021 PET imaging that show interval progression of mesenteric and retroperitoneal LAD. Pt also with worsening fatigue. At this time, I recommend treatment with BR regimen given local GI symptoms related to mesenteric LAD and fatigue. We discussed the risks and benefits of R-Bendamustine chemotherapy.   The potential side effects include, but are not limited to: nausea, vomiting, diarrhea, constipation, flu-like symptoms, infusion reaction, allergic reaction, flushing, taste changes, increased risk of infection, anemia, fatigue, alopecia, mucositis, myelosuppression, infertility and rarely, death. The patient has consented to begin therapy. Supportive medications include: ondansetron, prochlorperazine. -- Proceed with C1 of BR regimen today. -- Return in 4 weeks for C2    2. Depression / ADHD: On Buproprion, Fluoxetine, Vyvanse. 3. Abdominal and back pain: Improved after cholecystectomy in Dec 2020. Likely unrelated to the lymphoma. We previously discussed that shon-menopausal state can cause GI upset as can factors such as diet, stress. Reviewed healthy options. 4. H/o pruritus: Unclear etiology and may be related to allergic rhinitis. However, pt had severe episode of pruritus several years ago. 5. Diarrhea alternating with constipation: May be related to lymphoma vs irritable bowel syndrome. Has stool softeners and miralax to take as needed. 6. Bruising:  Likely due to SSRI. No significant bleeding episodes. 7. Vaccine counseling:  Had Covid booster and flu shot 12/14/21.     8. Fatigue:   Likely related to #1 and should improve with treatment. Emotional well being: Pt is coping well with his/her disease and has excellent support. I appreciate the opportunity to participate in Ms. Lewis Floor care. I personally saw and evaluated the patient and performed the key components of medical decision making. The history, physical exam, and documentation were performed by Dorothy Montes NP. I reviewed and verified the above documentation and modified it as needed. Proceed with C1 of BR today. Reminded pt of possible side effects and advised plenty of fluid intake along with adequate nutrition.       Signed By: Hannah Daniel MD     January 6, 2022

## 2022-01-06 ENCOUNTER — OFFICE VISIT (OUTPATIENT)
Dept: ONCOLOGY | Age: 52
End: 2022-01-06
Payer: COMMERCIAL

## 2022-01-06 ENCOUNTER — HOSPITAL ENCOUNTER (OUTPATIENT)
Dept: INFUSION THERAPY | Age: 52
Discharge: HOME | End: 2022-01-06
Payer: COMMERCIAL

## 2022-01-06 VITALS
RESPIRATION RATE: 27 BRPM | SYSTOLIC BLOOD PRESSURE: 98 MMHG | HEART RATE: 80 BPM | DIASTOLIC BLOOD PRESSURE: 60 MMHG | BODY MASS INDEX: 24.5 KG/M2 | OXYGEN SATURATION: 100 % | TEMPERATURE: 97.8 F | HEIGHT: 67 IN | WEIGHT: 156.1 LBS

## 2022-01-06 VITALS
TEMPERATURE: 97.4 F | BODY MASS INDEX: 24.48 KG/M2 | OXYGEN SATURATION: 94 % | RESPIRATION RATE: 18 BRPM | SYSTOLIC BLOOD PRESSURE: 105 MMHG | WEIGHT: 156 LBS | DIASTOLIC BLOOD PRESSURE: 71 MMHG | HEIGHT: 67 IN | HEART RATE: 91 BPM

## 2022-01-06 DIAGNOSIS — K59.09 OTHER CONSTIPATION: ICD-10-CM

## 2022-01-06 DIAGNOSIS — C82.13 FOLLICULAR LYMPHOMA GRADE II OF INTRA-ABDOMINAL LYMPH NODES (HCC): Primary | ICD-10-CM

## 2022-01-06 DIAGNOSIS — R53.82 CHRONIC FATIGUE: ICD-10-CM

## 2022-01-06 DIAGNOSIS — Z51.11 CHEMOTHERAPY MANAGEMENT, ENCOUNTER FOR: ICD-10-CM

## 2022-01-06 LAB
ALBUMIN SERPL-MCNC: 4 G/DL (ref 3.5–5)
ALBUMIN/GLOB SERPL: 1.1 {RATIO} (ref 1.1–2.2)
ALP SERPL-CCNC: 129 U/L (ref 45–117)
ALT SERPL-CCNC: 25 U/L (ref 12–78)
ANION GAP SERPL CALC-SCNC: 5 MMOL/L (ref 5–15)
AST SERPL-CCNC: 24 U/L (ref 15–37)
BASOPHILS # BLD: 0.1 K/UL (ref 0–0.1)
BASOPHILS NFR BLD: 2 % (ref 0–1)
BILIRUB SERPL-MCNC: 0.9 MG/DL (ref 0.2–1)
BUN SERPL-MCNC: 13 MG/DL (ref 6–20)
BUN/CREAT SERPL: 16 (ref 12–20)
CALCIUM SERPL-MCNC: 9.2 MG/DL (ref 8.5–10.1)
CHLORIDE SERPL-SCNC: 105 MMOL/L (ref 97–108)
CO2 SERPL-SCNC: 27 MMOL/L (ref 21–32)
CREAT SERPL-MCNC: 0.79 MG/DL (ref 0.55–1.02)
DIFFERENTIAL METHOD BLD: ABNORMAL
EOSINOPHIL # BLD: 0.4 K/UL (ref 0–0.4)
EOSINOPHIL NFR BLD: 6 % (ref 0–7)
ERYTHROCYTE [DISTWIDTH] IN BLOOD BY AUTOMATED COUNT: 12.1 % (ref 11.5–14.5)
GLOBULIN SER CALC-MCNC: 3.5 G/DL (ref 2–4)
GLUCOSE SERPL-MCNC: 95 MG/DL (ref 65–100)
HBV SURFACE AB SER QL: NONREACTIVE
HBV SURFACE AB SER-ACNC: <3.1 MIU/ML
HBV SURFACE AG SER QL: 0.4 INDEX
HBV SURFACE AG SER QL: NEGATIVE
HCT VFR BLD AUTO: 43.6 % (ref 35–47)
HGB BLD-MCNC: 14.6 G/DL (ref 11.5–16)
IMM GRANULOCYTES # BLD AUTO: 0 K/UL (ref 0–0.04)
IMM GRANULOCYTES NFR BLD AUTO: 0 % (ref 0–0.5)
LYMPHOCYTES # BLD: 1.3 K/UL (ref 0.8–3.5)
LYMPHOCYTES NFR BLD: 18 % (ref 12–49)
MCH RBC QN AUTO: 32.4 PG (ref 26–34)
MCHC RBC AUTO-ENTMCNC: 33.5 G/DL (ref 30–36.5)
MCV RBC AUTO: 96.9 FL (ref 80–99)
MONOCYTES # BLD: 0.7 K/UL (ref 0–1)
MONOCYTES NFR BLD: 11 % (ref 5–13)
NEUTS SEG # BLD: 4.3 K/UL (ref 1.8–8)
NEUTS SEG NFR BLD: 63 % (ref 32–75)
NRBC # BLD: 0 K/UL (ref 0–0.01)
NRBC BLD-RTO: 0 PER 100 WBC
PLATELET # BLD AUTO: 368 K/UL (ref 150–400)
PMV BLD AUTO: 8.9 FL (ref 8.9–12.9)
POTASSIUM SERPL-SCNC: 4.1 MMOL/L (ref 3.5–5.1)
PROT SERPL-MCNC: 7.5 G/DL (ref 6.4–8.2)
RBC # BLD AUTO: 4.5 M/UL (ref 3.8–5.2)
SODIUM SERPL-SCNC: 137 MMOL/L (ref 136–145)
WBC # BLD AUTO: 6.8 K/UL (ref 3.6–11)

## 2022-01-06 PROCEDURE — 87340 HEPATITIS B SURFACE AG IA: CPT

## 2022-01-06 PROCEDURE — 96361 HYDRATE IV INFUSION ADD-ON: CPT

## 2022-01-06 PROCEDURE — 96413 CHEMO IV INFUSION 1 HR: CPT

## 2022-01-06 PROCEDURE — 86704 HEP B CORE ANTIBODY TOTAL: CPT

## 2022-01-06 PROCEDURE — 74011000258 HC RX REV CODE- 258: Performed by: INTERNAL MEDICINE

## 2022-01-06 PROCEDURE — 85025 COMPLETE CBC W/AUTO DIFF WBC: CPT

## 2022-01-06 PROCEDURE — 96415 CHEMO IV INFUSION ADDL HR: CPT

## 2022-01-06 PROCEDURE — 74011250636 HC RX REV CODE- 250/636: Performed by: INTERNAL MEDICINE

## 2022-01-06 PROCEDURE — 36415 COLL VENOUS BLD VENIPUNCTURE: CPT

## 2022-01-06 PROCEDURE — 99215 OFFICE O/P EST HI 40 MIN: CPT | Performed by: NURSE PRACTITIONER

## 2022-01-06 PROCEDURE — 74011250637 HC RX REV CODE- 250/637: Performed by: INTERNAL MEDICINE

## 2022-01-06 PROCEDURE — 96375 TX/PRO/DX INJ NEW DRUG ADDON: CPT

## 2022-01-06 PROCEDURE — 74011250636 HC RX REV CODE- 250/636: Performed by: NURSE PRACTITIONER

## 2022-01-06 PROCEDURE — 80053 COMPREHEN METABOLIC PANEL: CPT

## 2022-01-06 PROCEDURE — 74011000250 HC RX REV CODE- 250: Performed by: INTERNAL MEDICINE

## 2022-01-06 PROCEDURE — 96411 CHEMO IV PUSH ADDL DRUG: CPT

## 2022-01-06 PROCEDURE — 86706 HEP B SURFACE ANTIBODY: CPT

## 2022-01-06 RX ORDER — SODIUM CHLORIDE 0.9 % (FLUSH) 0.9 %
10 SYRINGE (ML) INJECTION AS NEEDED
Status: DISPENSED | OUTPATIENT
Start: 2022-01-06 | End: 2022-01-06

## 2022-01-06 RX ORDER — PALONOSETRON 0.05 MG/ML
0.25 INJECTION, SOLUTION INTRAVENOUS ONCE
Status: COMPLETED | OUTPATIENT
Start: 2022-01-06 | End: 2022-01-06

## 2022-01-06 RX ORDER — ONDANSETRON 2 MG/ML
8 INJECTION INTRAMUSCULAR; INTRAVENOUS AS NEEDED
Status: DISCONTINUED | OUTPATIENT
Start: 2022-01-06 | End: 2022-01-07 | Stop reason: HOSPADM

## 2022-01-06 RX ORDER — ACETAMINOPHEN 325 MG/1
650 TABLET ORAL AS NEEDED
Status: DISCONTINUED | OUTPATIENT
Start: 2022-01-06 | End: 2022-01-07 | Stop reason: HOSPADM

## 2022-01-06 RX ORDER — DIPHENHYDRAMINE HYDROCHLORIDE 50 MG/ML
50 INJECTION, SOLUTION INTRAMUSCULAR; INTRAVENOUS ONCE
Status: COMPLETED | OUTPATIENT
Start: 2022-01-06 | End: 2022-01-06

## 2022-01-06 RX ORDER — ACETAMINOPHEN 325 MG/1
650 TABLET ORAL ONCE
Status: COMPLETED | OUTPATIENT
Start: 2022-01-06 | End: 2022-01-06

## 2022-01-06 RX ORDER — DIPHENHYDRAMINE HYDROCHLORIDE 50 MG/ML
50 INJECTION, SOLUTION INTRAMUSCULAR; INTRAVENOUS AS NEEDED
Status: DISCONTINUED | OUTPATIENT
Start: 2022-01-06 | End: 2022-01-07 | Stop reason: HOSPADM

## 2022-01-06 RX ORDER — SODIUM CHLORIDE 9 MG/ML
25 INJECTION, SOLUTION INTRAVENOUS CONTINUOUS
Status: DISPENSED | OUTPATIENT
Start: 2022-01-06 | End: 2022-01-06

## 2022-01-06 RX ORDER — HYDROCORTISONE SODIUM SUCCINATE 100 MG/2ML
100 INJECTION, POWDER, FOR SOLUTION INTRAMUSCULAR; INTRAVENOUS AS NEEDED
Status: DISPENSED | OUTPATIENT
Start: 2022-01-06 | End: 2022-01-06

## 2022-01-06 RX ORDER — SODIUM CHLORIDE 9 MG/ML
10 INJECTION INTRAMUSCULAR; INTRAVENOUS; SUBCUTANEOUS AS NEEDED
Status: ACTIVE | OUTPATIENT
Start: 2022-01-06 | End: 2022-01-06

## 2022-01-06 RX ORDER — DIPHENHYDRAMINE HYDROCHLORIDE 50 MG/ML
25 INJECTION, SOLUTION INTRAMUSCULAR; INTRAVENOUS AS NEEDED
Status: ACTIVE | OUTPATIENT
Start: 2022-01-06 | End: 2022-01-06

## 2022-01-06 RX ORDER — DEXAMETHASONE SODIUM PHOSPHATE 100 MG/10ML
10 INJECTION INTRAMUSCULAR; INTRAVENOUS ONCE
Status: COMPLETED | OUTPATIENT
Start: 2022-01-06 | End: 2022-01-06

## 2022-01-06 RX ORDER — LORAZEPAM 2 MG/ML
0.5 INJECTION INTRAMUSCULAR ONCE
Status: COMPLETED | OUTPATIENT
Start: 2022-01-06 | End: 2022-01-06

## 2022-01-06 RX ADMIN — LORAZEPAM 0.5 MG: 2 INJECTION INTRAMUSCULAR; INTRAVENOUS at 13:09

## 2022-01-06 RX ADMIN — DIPHENHYDRAMINE HYDROCHLORIDE 50 MG: 50 INJECTION INTRAMUSCULAR; INTRAVENOUS at 10:37

## 2022-01-06 RX ADMIN — BENDAMUSTINE HYDROCHLORIDE 165 MG: 25 INJECTION, SOLUTION INTRAVENOUS at 17:20

## 2022-01-06 RX ADMIN — SODIUM CHLORIDE 500 ML: 9 INJECTION, SOLUTION INTRAVENOUS at 11:24

## 2022-01-06 RX ADMIN — PALONOSETRON 0.25 MG: 0.05 INJECTION, SOLUTION INTRAVENOUS at 16:45

## 2022-01-06 RX ADMIN — SODIUM CHLORIDE 683 MG: 0.9 INJECTION, SOLUTION INTRAVENOUS at 11:10

## 2022-01-06 RX ADMIN — ACETAMINOPHEN 650 MG: 325 TABLET ORAL at 10:36

## 2022-01-06 RX ADMIN — SODIUM CHLORIDE, PRESERVATIVE FREE 20 MG: 5 INJECTION INTRAVENOUS at 11:30

## 2022-01-06 RX ADMIN — SODIUM CHLORIDE 25 ML/HR: 9 INJECTION, SOLUTION INTRAVENOUS at 10:36

## 2022-01-06 RX ADMIN — DEXAMETHASONE SODIUM PHOSPHATE 10 MG: 10 INJECTION INTRAMUSCULAR; INTRAVENOUS at 16:48

## 2022-01-06 RX ADMIN — HYDROCORTISONE SODIUM SUCCINATE 100 MG: 100 INJECTION, POWDER, FOR SOLUTION INTRAMUSCULAR; INTRAVENOUS at 11:28

## 2022-01-06 NOTE — PROGRESS NOTES
[facility] [place] Progress Note    Date: 2022    Name: Matt Flowers    MRN: [id number]         : 1970    Ms. Man Arrived ambulatory and in no distress for C1D1 of Rituxan/Benamustine Regimen. Assessment was completed by [facility], no acute issues at this time, no new complaints voiced. PIV placed by [facility] without difficulty, labs drawn & sent for processing. Covid questionnaire completed. 1. Do you have any symptoms of COVID-19? SOB, coughing, fever, or generally not feeling well - no    2. Have you been exposed to COVID-19 recently? - no    3. Have you had any recent contact with family/friend that has a pending COVID test? - no       Patient proceed to appointment with Dr. Jenice Duane. Ms. Chanda Abdul vitals were reviewed. Visit Vitals  /71   Pulse 91   Temp 97.4 °F (36.3 °C)   Resp 18   Ht 5' 7\" (1.702 m)   Wt 70.8 kg (156 lb 1.6 oz)   SpO2 94%   Breastfeeding No   BMI 24.45 kg/m²       Lab results were obtained and reviewed. Recent Results (from the past 12 hour(s))   CBC WITH AUTOMATED DIFF    Collection Time: 22  9:13 AM   Result Value Ref Range    WBC 6.8 3.6 - 11.0 K/uL    RBC 4.50 3.80 - 5.20 M/uL    HGB 14.6 11.5 - 16.0 g/dL    HCT 43.6 35.0 - 47.0 %    MCV 96.9 80.0 - 99.0 FL    MCH 32.4 26.0 - 34.0 PG    MCHC 33.5 30.0 - 36.5 g/dL    RDW 12.1 11.5 - 14.5 %    PLATELET 177 823 - 723 K/uL    MPV 8.9 8.9 - 12.9 FL    NRBC 0.0 0  WBC    ABSOLUTE NRBC 0.00 0.00 - 0.01 K/uL    NEUTROPHILS 63 32 - 75 %    LYMPHOCYTES 18 12 - 49 %    MONOCYTES 11 5 - 13 %    EOSINOPHILS 6 0 - 7 %    BASOPHILS 2 (H) 0 - 1 %    IMMATURE GRANULOCYTES 0 0.0 - 0.5 %    ABS. NEUTROPHILS 4.3 1.8 - 8.0 K/UL    ABS. LYMPHOCYTES 1.3 0.8 - 3.5 K/UL    ABS. MONOCYTES 0.7 0.0 - 1.0 K/UL    ABS. EOSINOPHILS 0.4 0.0 - 0.4 K/UL    ABS. BASOPHILS 0.1 0.0 - 0.1 K/UL    ABS. IMM.  GRANS. 0.0 0.00 - 0.04 K/UL    DF AUTOMATED     METABOLIC PANEL, COMPREHENSIVE    Collection Time: 22  9:13 AM   Result Value Ref Range    Sodium 137 136 - 145 mmol/L    Potassium 4.1 3.5 - 5.1 mmol/L    Chloride 105 97 - 108 mmol/L    CO2 27 21 - 32 mmol/L    Anion gap 5 5 - 15 mmol/L    Glucose 95 65 - 100 mg/dL    BUN 13 6 - 20 MG/DL    Creatinine 0.79 0.55 - 1.02 MG/DL    BUN/Creatinine ratio 16 12 - 20      GFR est AA >60 >60 ml/min/1.73m2    GFR est non-AA >60 >60 ml/min/1.73m2    Calcium 9.2 8.5 - 10.1 MG/DL    Bilirubin, total 0.9 0.2 - 1.0 MG/DL    ALT (SGPT) 25 12 - 78 U/L    AST (SGOT) 24 15 - 37 U/L    Alk. phosphatase 129 (H) 45 - 117 U/L    Protein, total 7.5 6.4 - 8.2 g/dL    Albumin 4.0 3.5 - 5.0 g/dL    Globulin 3.5 2.0 - 4.0 g/dL    A-G Ratio 1.1 1.1 - 2.2       1120: 10 minutes into Rituximab pt stated her face feels really hot. Infusion stopped and NS infusing. Pt stated she is having difficulty breathing and chest tightness. VS stable. Solu cortef and Pepcid administered and NP notified. NP ordered to restart infusion at previous rate when symptoms resolve. After about 20 minutes pt symptoms resolved. 1150: Infusion restarted. 12:15: Pt stated she is having stomach cramping and slight nausea and feeld \"foggy headed. \" Infusion stopped. Ns infusing. Vs stable. NP notified. NP ordered to restart at same rate as before when symptoms resolve. 1235: Infusion restarted. 1250: Pt stated feels chest heaviness and feeling of indigestion. NS infusing, VS stable and NP notified. Np ordered 0.5 mg IV ativan. NP stated to wait 15 minutes and restart. 1325: Infusion restarted.       Medications:  Medications Administered     0.9% sodium chloride infusion     Admin Date  01/06/2022 Action  New Bag Dose  25 mL/hr Rate  25 mL/hr Route  IntraVENous Administered By  Shima Johnston RN          acetaminophen (TYLENOL) tablet 650 mg     Admin Date  01/06/2022 Action  Given Dose  650 mg Route  Oral Administered By  Shima Johnston RN          bendamustine 165 mg in 0.9% sodium chloride 50 mL, overfill volume 5 mL chemo infusion Admin Date  01/06/2022 Action  New Bag Dose  165 mg Rate  369.6 mL/hr Route  IntraVENous Administered By  Abebe Maria, RN          dexamethasone (DECADRON) 10 mg/mL injection 10 mg     Admin Date  01/06/2022 Action  Given Dose  10 mg Route  IntraVENous Administered By  Abebe Maria, ANAND          diphenhydrAMINE (BENADRYL) injection 50 mg     Admin Date  01/06/2022 Action  Given Dose  50 mg Route  IntraVENous Administered By  Abebe Maria, RN          famotidine (PF) (PEPCID) 20 mg in 0.9% sodium chloride 10 mL injection     Admin Date  01/06/2022 Action  Given Dose  20 mg Route  IntraVENous Administered By  Abebe Maria, ANAND          hydrocortisone Sod Succ (PF) (SOLU-CORTEF) injection 100 mg     Admin Date  01/06/2022 Action  Given Dose  100 mg Route  IntraVENous Administered By  Abebe Maria, ANAND          LORazepam (ATIVAN) injection 0.5 mg     Admin Date  01/06/2022 Action  Given Dose  0.5 mg Route  IntraVENous Administered By  Abebe Maria, ANAND          palonosetron HCl (ALOXI) injection 0.25 mg     Admin Date  01/06/2022 Action  Given Dose  0.25 mg Route  IntraVENous Administered By  Abebe Maria, ANAND          riTUXimab-pvvr (Jenet Slipper) 683 mg in 0.9% sodium chloride 683 mL infusion     Admin Date  01/06/2022 Action  New Bag Dose  683 mg Rate  50 mL/hr Route  IntraVENous Administered By  Abebe Maria, RN           Admin Date  01/06/2022 Action  Restarted Dose   Rate  50 mL/hr Route  IntraVENous Administered By  Abebe Maria, ANAND           Admin Date  01/06/2022 Action  Restarted Dose   Rate  50 mL/hr Route  IntraVENous Administered By  Abebe Maria, RN           Admin Date  01/06/2022 Action  Restarted Dose   Rate  50 mL/hr Route  IntraVENous Administered By  Abebe Maria, ANAND           Admin Date  01/06/2022 Action  Rate Change Dose   Rate  100 mL/hr Route  IntraVENous Administered By  Abebe Maria, ANAND           Admin Date  01/06/2022 Action  Rate Change Dose   Rate  150 mL/hr Route  IntraVENous Administered By  Caterina Castillo RN           Admin Date  01/06/2022 Action  Rate Change Dose   Rate  200 mL/hr Route  IntraVENous Administered By  Caterina Castillo RN           Admin Date  01/06/2022 Action  Rate Change Dose   Rate  250 mL/hr Route  IntraVENous Administered By  Caterina Castillo RN           Admin Date  01/06/2022 Action  Rate Change Dose   Rate  300 mL/hr Route  IntraVENous Administered By  Caterina Castillo RN           Admin Date  01/06/2022 Action  Rate Change Dose   Rate  350 mL/hr Route  IntraVENous Administered By  Caterina Castillo RN          sodium chloride 0.9 % bolus infusion 500 mL     Admin Date  01/06/2022 Action  New Bag Dose  500 mL Route  IntraVENous Administered By  Caterina Castillo RN                  Ms. Estella Ames tolerated treatment and was discharged from [place] [address] in stable condition. PIV flushed and removed. She is to return on 1/7/22 for her next appointment.     Mary Asencio RN  January 6, 2022

## 2022-01-06 NOTE — PROGRESS NOTES

## 2022-01-07 ENCOUNTER — HOSPITAL ENCOUNTER (OUTPATIENT)
Dept: INFUSION THERAPY | Age: 52
Discharge: HOME | End: 2022-01-07
Payer: COMMERCIAL

## 2022-01-07 VITALS
SYSTOLIC BLOOD PRESSURE: 116 MMHG | RESPIRATION RATE: 20 BRPM | HEART RATE: 72 BPM | TEMPERATURE: 98.2 F | OXYGEN SATURATION: 99 % | DIASTOLIC BLOOD PRESSURE: 66 MMHG

## 2022-01-07 DIAGNOSIS — C82.13 FOLLICULAR LYMPHOMA GRADE II OF INTRA-ABDOMINAL LYMPH NODES (HCC): Primary | ICD-10-CM

## 2022-01-07 LAB — HBV CORE AB SERPL QL IA: NEGATIVE

## 2022-01-07 PROCEDURE — 96409 CHEMO IV PUSH SNGL DRUG: CPT

## 2022-01-07 PROCEDURE — 74011250636 HC RX REV CODE- 250/636: Performed by: INTERNAL MEDICINE

## 2022-01-07 PROCEDURE — 74011000258 HC RX REV CODE- 258: Performed by: INTERNAL MEDICINE

## 2022-01-07 PROCEDURE — 96375 TX/PRO/DX INJ NEW DRUG ADDON: CPT

## 2022-01-07 PROCEDURE — 74011000250 HC RX REV CODE- 250: Performed by: INTERNAL MEDICINE

## 2022-01-07 RX ORDER — SODIUM CHLORIDE 9 MG/ML
25 INJECTION, SOLUTION INTRAVENOUS CONTINUOUS
Status: DISCONTINUED | OUTPATIENT
Start: 2022-01-07 | End: 2022-01-08 | Stop reason: HOSPADM

## 2022-01-07 RX ORDER — SODIUM CHLORIDE 0.9 % (FLUSH) 0.9 %
10 SYRINGE (ML) INJECTION AS NEEDED
Status: DISCONTINUED | OUTPATIENT
Start: 2022-01-07 | End: 2022-01-08 | Stop reason: HOSPADM

## 2022-01-07 RX ORDER — DEXAMETHASONE SODIUM PHOSPHATE 100 MG/10ML
10 INJECTION INTRAMUSCULAR; INTRAVENOUS ONCE
Status: COMPLETED | OUTPATIENT
Start: 2022-01-07 | End: 2022-01-07

## 2022-01-07 RX ADMIN — DEXAMETHASONE SODIUM PHOSPHATE 10 MG: 10 INJECTION INTRAMUSCULAR; INTRAVENOUS at 13:12

## 2022-01-07 RX ADMIN — SODIUM CHLORIDE, PRESERVATIVE FREE 10 ML: 5 INJECTION INTRAVENOUS at 13:12

## 2022-01-07 RX ADMIN — SODIUM CHLORIDE, PRESERVATIVE FREE 10 ML: 5 INJECTION INTRAVENOUS at 14:25

## 2022-01-07 RX ADMIN — SODIUM CHLORIDE 25 ML/HR: 9 INJECTION, SOLUTION INTRAVENOUS at 13:10

## 2022-01-07 RX ADMIN — BENDAMUSTINE HYDROCHLORIDE 165 MG: 25 INJECTION, SOLUTION INTRAVENOUS at 14:12

## 2022-01-07 NOTE — PROGRESS NOTES
[facility] Progress Note    Date: 2022    Name: Ena Hayward    MRN: [id number]         : 1970    Ms. Man arrived ambulatory and in no distress for C1D2 Bendamustine Infusion. Assessment was completed, patient c/o some constipation and stomach bloating. 24 G PIV established to right arm, + blood return. Chemotherapy Flowsheet 2022   Cycle C1D2   Date 2022   Drug / Regimen Bendamustine   Pre Meds given   Notes given           Ms. Man's vitals were reviewed. Visit Vitals  /66   Pulse 72   Temp 98.2 °F (36.8 °C)   Resp 20   SpO2 99%         Medications:  Medications Administered     0.9% sodium chloride infusion     Admin Date  2022 Action  New Bag Dose  25 mL/hr Rate  25 mL/hr Route  IntraVENous Administered By  Rock Mathews RN          bendamustine 165 mg in 0.9% sodium chloride 50 mL, overfill volume 5 mL chemo infusion     Admin Date  2022 Action  New Bag Dose  165 mg Rate  369.6 mL/hr Route  IntraVENous Administered By  Rock Mathews RN          dexamethasone (DECADRON) 10 mg/mL injection 10 mg     Admin Date  2022 Action  Given Dose  10 mg Route  IntraVENous Administered By  Rock Mathews RN          sodium chloride (NS) flush 10 mL     Admin Date  2022 Action  Given Dose  10 mL Route  IntraVENous Administered By  Rock Mathews RN           Admin Date  2022 Action  Given Dose  10 mL Route  IntraVENous Administered By  Rock Mathews RN                Ms. Fabian Llamas tolerated treatment well and was discharged from [place] [address] in stable condition at 1430. PIV flushed & removed. She is to return on February 3 at 0800 for her next appointment.     Audra Ruiz RN  2022

## 2022-01-12 ENCOUNTER — TELEPHONE (OUTPATIENT)
Dept: ONCOLOGY | Age: 52
End: 2022-01-12

## 2022-01-12 NOTE — TELEPHONE ENCOUNTER
[address] at [place]  [phone]        01/12/22 11:38 AM Received fax from [facility] requesting medical records for the [facility] case management program. Attempted to call patient to verify if okay to send requested medical records. No answer via home/mobile number listed. Left message requesting call back. Provided office phone number as well.

## 2022-01-26 RX ORDER — EPINEPHRINE 1 MG/ML
0.3 INJECTION, SOLUTION, CONCENTRATE INTRAVENOUS AS NEEDED
Status: CANCELLED | OUTPATIENT
Start: 2022-02-03

## 2022-01-26 RX ORDER — ONDANSETRON 2 MG/ML
8 INJECTION INTRAMUSCULAR; INTRAVENOUS AS NEEDED
Status: CANCELLED | OUTPATIENT
Start: 2022-02-04

## 2022-01-26 RX ORDER — SODIUM CHLORIDE 0.9 % (FLUSH) 0.9 %
10 SYRINGE (ML) INJECTION AS NEEDED
Status: CANCELLED | OUTPATIENT
Start: 2022-02-04

## 2022-01-26 RX ORDER — ALBUTEROL SULFATE 0.83 MG/ML
2.5 SOLUTION RESPIRATORY (INHALATION) AS NEEDED
Status: CANCELLED
Start: 2022-02-03

## 2022-01-26 RX ORDER — DIPHENHYDRAMINE HYDROCHLORIDE 50 MG/ML
50 INJECTION, SOLUTION INTRAMUSCULAR; INTRAVENOUS AS NEEDED
Status: CANCELLED
Start: 2022-02-04

## 2022-01-26 RX ORDER — ALBUTEROL SULFATE 0.83 MG/ML
2.5 SOLUTION RESPIRATORY (INHALATION) AS NEEDED
Status: CANCELLED
Start: 2022-02-04

## 2022-01-26 RX ORDER — SODIUM CHLORIDE 9 MG/ML
10 INJECTION INTRAMUSCULAR; INTRAVENOUS; SUBCUTANEOUS AS NEEDED
Status: CANCELLED | OUTPATIENT
Start: 2022-02-04

## 2022-01-26 RX ORDER — DEXAMETHASONE SODIUM PHOSPHATE 100 MG/10ML
10 INJECTION INTRAMUSCULAR; INTRAVENOUS ONCE
Status: CANCELLED | OUTPATIENT
Start: 2022-02-04 | End: 2022-02-04

## 2022-01-26 RX ORDER — HYDROCORTISONE SODIUM SUCCINATE 100 MG/2ML
100 INJECTION, POWDER, FOR SOLUTION INTRAMUSCULAR; INTRAVENOUS AS NEEDED
Status: CANCELLED | OUTPATIENT
Start: 2022-02-04

## 2022-01-26 RX ORDER — HEPARIN 100 UNIT/ML
300-500 SYRINGE INTRAVENOUS AS NEEDED
Status: CANCELLED
Start: 2022-02-04

## 2022-01-26 RX ORDER — ACETAMINOPHEN 325 MG/1
650 TABLET ORAL AS NEEDED
Status: CANCELLED
Start: 2022-02-04

## 2022-01-26 RX ORDER — SODIUM CHLORIDE 9 MG/ML
25 INJECTION, SOLUTION INTRAVENOUS CONTINUOUS
Status: CANCELLED | OUTPATIENT
Start: 2022-02-04

## 2022-01-26 RX ORDER — EPINEPHRINE 1 MG/ML
0.3 INJECTION, SOLUTION, CONCENTRATE INTRAVENOUS AS NEEDED
Status: CANCELLED | OUTPATIENT
Start: 2022-02-04

## 2022-01-26 RX ORDER — DIPHENHYDRAMINE HYDROCHLORIDE 50 MG/ML
25 INJECTION, SOLUTION INTRAMUSCULAR; INTRAVENOUS AS NEEDED
Status: CANCELLED
Start: 2022-02-04

## 2022-01-26 NOTE — PROGRESS NOTES
[id number] [facility] at [address] [place] [address]  [phone]    Hematology / Oncology Established Visit    Reason for Visit:   Rajinder Hernandez is a 49 y.o. female who is seen for follow up of lymphoma. PCP is Dr. Mario Alberto Mayen. Hematology Oncology Treatment History:     Diagnosis: Follicular lymphoma    Stage: III; FLIPI2 low risk    Pathology:   -1/20/20 Right inguinal LN excision: Follicular lymphoma, grade 1-2, follicular pattern, XP21 positive. Comment:   The LN specimen demonstrates increased follicles, lacking normal germinal centers. IHC markers are performed with good controls. Tumor cells are positive for CD20 and BCL2. CD3 marks background T cells. Cyclin D1 is negative in the lymphoid population. CD21 highlights intact dendritic meshworks. Flow cytometry shows positive expression of CD10 and kappa LC restriction. Ki-67 is pending. Focally there are a few follicles with greater than fifteen centroblasts per HPF; however the majority of follicles have less than 15 centroblasts/HPF, consistent with grade 1-2. Diffuse areas are not seen.    -2/5/20 Bone marrow biopsy: Normocellular marrow with multilineage hematopoiesis and <1% involvement by a CD10 positive B-cell lymphoproliferative disorder. Negative for immunophenotypic or morphologic evidence of dysplasia no increase in blasts. Comment   Flow cytometric analysis reveals a small population of CD10 positive clonal B cells compatible with involvement by the patient's recently diagnosed follicular lymphoma. FISH studies are pending and will be reported. Prior Treatment: None    Current Treatment: Bendamustine-Rituximab x 6 cycles, started 1/2022    History of Present Illness:   Rajinder Hernandez is a 49 y.o. female who comes in for evaluation of Follicular lymphoma. She states she has had vague symptoms for several years as follows. Pt reports h/o pruritus which started 6 yrs ago, which finally subsided with Zyrtec.  She saw a hematologist at that time, who told her symptoms might surface as a blood disorder in the future. In 2016, pt went to see her Gyn (Dr. Yoni Stephen) for abdominal cramping. Workup normal at time time. In summer of 2019, pt developed fatigue as well as same of the chronic pain in abdominal area. In fall of 2019, she developed memory issues with work. She went to see her Psychiatrist due to these symptoms and switched her from generic Wellbutrin to brand name Wellbutrin. No improvement after 3-4 weeks. She was okay with sleeping during that time, but continued to have severe fatigue out of character for her. She went to see her PCP and described a right inguinal LN enlargement. She went to see a surgeon, Dr. Kasey Joy. Ultrasound of that region did demonstrate an enlarged LN. FNA was inconclusive. Core needle biopsy was suspicious for B cell lymphoma. Excisional LN biopsy 5/69/53 revealed follicular lymphoma. CT of chest/abd/pelvis was notable for retroperitoneal, pelvic and portacaval LAD. Pt also reports left hip pain, left foot tingling. No fevers, chills, weight loss. She does have occasional sweats more notable a few months ago, but not drenching or nightly. Patient underwent staging bone marrow biopsy and PET scan. Previously saw [facility] for a 2nd opinion. She was told her GI symptoms are not related to lymphoma. Interval History:  She is seen for lymphoma and C2 BR. Reports after C1 she had morning nausea for about 3 weeks, fatigue and constipation. Constipation better managed since starting on daily miralax - no abdominal pain. She did not take antiemetics for nausea. Reports intermittent fatigue - some days worse than others. Eating and hydrating well. No fevers, chills, SOB or recent infections. Night sweats resolved. Tearful today due to anticipatory nausea and anxiety about treatment. Alone today.        PMHx: Chronic pain, Depression, Fibrocystic breast  PSurgHx: None aside from bilat breast biopsy and wisdom teeth extraction  SHx: Quit smoking 1/2000. Drinks 5 glasses of wine per week. FHx: Mother had breast cancer age 61. Father had CVA. Review of Systems: A complete review of systems was obtained, negative except as described above. Physical Exam:     Visit Vitals  /70   Pulse 76   Temp 97 °F (36.1 °C)   Ht 5' 7.5\" (1.715 m)   Wt 155 lb 3.2 oz (70.4 kg)   SpO2 99%   BMI 23.95 kg/m²     ECOG PS: 0  General: no distress  Eyes: anicteric sclerae  HENT: oropharynx clear  Neck: supple  Lymphatic: no cervical, axillary adenopathy; prior R inguinal LN no longer evident; L supraclavicular LN noted. Respiratory: normal respiratory effort  CV: no peripheral edema  GI: soft, nontender, nondistended, no masses  Skin: no rashes; no ecchymoses; no petechiae      Results:     Lab Results   Component Value Date/Time    WBC 4.7 02/03/2022 08:13 AM    HGB 14.1 02/03/2022 08:13 AM    HCT 41.3 02/03/2022 08:13 AM    PLATELET 780 53/22/6950 08:13 AM    MCV 95.4 02/03/2022 08:13 AM    ABS. NEUTROPHILS 2.5 02/03/2022 08:13 AM     Lab Results   Component Value Date/Time    Sodium 137 01/06/2022 09:13 AM    Potassium 4.1 01/06/2022 09:13 AM    Chloride 105 01/06/2022 09:13 AM    CO2 27 01/06/2022 09:13 AM    Glucose 95 01/06/2022 09:13 AM    BUN 13 01/06/2022 09:13 AM    Creatinine 0.79 01/06/2022 09:13 AM    GFR est AA >60 01/06/2022 09:13 AM    GFR est non-AA >60 01/06/2022 09:13 AM    Calcium 9.2 01/06/2022 09:13 AM    Glucose (POC) 88 12/10/2021 07:07 AM     Lab Results   Component Value Date/Time    Bilirubin, total 0.9 01/06/2022 09:13 AM    ALT (SGPT) 25 01/06/2022 09:13 AM    Alk.  phosphatase 129 (H) 01/06/2022 09:13 AM    Protein, total 7.5 01/06/2022 09:13 AM    Albumin 4.0 01/06/2022 09:13 AM    Globulin 3.5 01/06/2022 09:13 AM     No results found for: IRON, FE, TIBC, IBCT, PSAT, FERR    No results found for: B12LT, FOL, RBCF  Lab Results   Component Value Date/Time    TSH 1.830 09/25/2019 12:45 PM     Lab Results   Component Value Date/Time    Hepatitis B surface Ag 0.40 01/06/2022 09:13 AM    Hepatitis B surface Ab <3.10 01/06/2022 09:13 AM    Hep B Core Ab, total Negative 01/06/2022 09:13 AM     12/2/21 LD: 170    Imaging:     Chest CT  1/24/20:  FINDINGS:  The normal-sized axillary lymph nodes are unchanged. THYROID: No nodule. MEDIASTINUM: No mass or lymphadenopathy. MARCE: No mass or lymphadenopathy. THORACIC AORTA: No aneurysm. MAIN PULMONARY ARTERY: Normal in caliber. TRACHEA/BRONCHI: Patent. ESOPHAGUS: No wall thickening or dilatation. HEART: Normal in size. PLEURA: No effusion or pneumothorax. LUNGS: Laterally in the left lower lobe on image 53 is a 2 to 3 mm pulmonary  nodule which is unchanged when compared with the examination of 6/27/2017. This  is a benign nodule and not significant. .  There are normal size lymph nodes adjacent to the descending thoracic aorta and  paraspinal region which have increased in size but remain normal in size. .  Abdomen and pelvis: There has been interval development of adenopathy. In the portacaval space there  is a lymph node that measures 17 mm in short axis. There are retroperitoneal  lymph nodes that are enlarged as well. The largest lymph node in the  retroperitoneum is posterior to the inferior vena cava beginning at the level of  the right renal artery this extends inferiorly. This measures 2.4 x 1.0 x 4.0  cm. There are also enlarged lymph nodes in the left aortic region largest  measuring 12 mm in short axis. There are also lymph nodes posterior to the head of the pancreas and anterior to  the inferior vena cava which are enlarged the largest measures 10 mm in short  axis  There are enlarged lymph nodes in the interaortocaval space. Enlarged lymph  nodes along the right common iliac artery largest measuring approximately 12 mm  in short axis. There are enlarged lymph nodes in the right external iliac chain  largest measuring 9 mm.   There is an enlarged lymph node along the right pelvic sidewall measuring 2.5 x  1.3 cm. There are postsurgical changes in the right inguinal region with one  tiny locule of air. There are residual lymph nodes in the right inguinal largest  measuring 10 mm. .  The uterus images normally. There is a 1 cm cyst in the right ovary. There is no free fluid. Review of the bone windows reveals no destructive lesions. The liver and gallbladder are unremarkable. The spleen is normal in size and  configuration. The pancreas and adrenal glands and kidneys are unremarkable. The aorta and inferior vena cava are unremarkable. IMPRESSION:  1. There is is adenopathy as described. There is adenopathy in the right  inguinal region, along the right pelvic sidewall, along the right iliac chain,  bilateral retroperitoneum, and in the portacaval space as well as posterior to  the pancreas head. 2. In the posterior mediastinum adjacent to the descending thoracic aorta are  lymph nodes that measure less than 1 cm in size but these have increased in the  interval.  3. Please see above text    PET 2/10/20:   FINDINGS:  HEAD/NECK: No apparent foci of abnormal hypermetabolism. Cerebral evaluation is  limited by normal intense activity. CHEST: Mildly hypermetabolic left supraclavicular lymph node, maximum SUV 4.2. ABDOMEN/PELVIS: Mildly hypermetabolic portacaval, aortocaval, and left  para-aortic lymph nodes are noted, maximum SUV 5.5 of an aortocaval lymph node. Hypermetabolic right common iliac, right external iliac, right obturator, and  right inguinal lymph nodes.   SKELETON: No foci of abnormal hypermetabolism in the axial and visualized  appendicular skeleton. IMPRESSION: Mildly hypermetabolic left supraclavicular, abdominal,  retroperitoneal, and pelvic lymph nodes. Ch/abd/pelvis CT 5/7/20:  Lymph nodes in chest: There is no new mediastinal, hilar or axillary lymphadenopathy.   Subcentimeter bilateral axillary lymph nodes are unchanged. Subcentimeter  posterior mediastinal lymph node abutting the descending thoracic aorta is  unchanged compared to prior CT dated January 2020. 1.3 cm x 1.1 cm left  supraclavicular lymph node is unchanged compared to prior CT dated January 2020. Lymph nodes in abdom/pelvis: There is a prominent portacaval lymph node measuring approximately  1.6 cm x 2.3 cm, unchanged compared to prior CT dated January 2020. There are multiple prominent and mildly enlarged retroperitoneal lymph nodes, unchanged  compared to prior CT dated January 2020. For example, there is a left  para-aortic retroperitoneal lymph node measures 1.2 cm x 1.6 cm, unchanged  compared to prior CT dated January 2020. As an additional example, there is a 9  mm x 1.3 cm aortocaval lymph node, unchanged compared to prior CT dated January 2020. Right iliac lymph nodes are unchanged compared to prior CT dated January 2020. For example, the largest right iliac lymph node measures 1.1 cm x 1.4 cm,  unchanged compared to prior CT dated January 2020. Right internal obturator  lymph node is unchanged compared to prior CT dated January 2020) measuring  approximately 2.8 cm x 1.4 cm. Prominent right inguinal lymph nodes are not  significantly changed in size. For example, there is a 1 cm x 1.4 cm right  inguinal lymph node, unchanged compared to prior CT dated January 2020. IMPRESSION: No interval change. Discussed with radiology - the right RP LN near R renal artery is unchanged. The 4cm dimension is craniocaudal.    11/9/20 CT c/a/p:  FINDINGS:   LYMPH NODES: There is a prominent portacaval lymph node measuring approximately  1.8 x 2.0 cm, not significantly changed from prior measurements of 1.6 cm x 2.3  cm. There are  multiple prominent and mildly enlarged retroperitoneal lymph nodes, not  significantly changed from prior.  For example, there is a left para-aortic  retroperitoneal lymph node that measures 1.1 x 1.7 cm, not significantly changed  from prior measurements of 1.2 x 1.6 and a 10 x 14 mm aortocaval lymph node, not  significantly changed from prior measurements of 9 x 13 mm. Right iliac lymph  nodes are little changed, though the largest has slightly increased in size  measuring 1.3 x 1.6 cm, previously 1.1 x 1.4 cm. Right internal obturator is not  significantly changed measuring 1.1 x 2.7 cm, previously 1.3 x 3.0 cm. Prominent  right inguinal lymph nodes are not significantly changed in size measuring 1.0 x  1.4 cm. IMPRESSION: No significant interval change apart from a single right common  iliac pelvic lymph nodes which shows slight increase in size by measurement from  1.1 x 1.4 cm to 1.3 x 1.6 cm.    11/18/21 CT ch/abd/pelvis:  LYMPH NODES: Extensive increased retroperitoneal and portacaval adenopathy. 3-67 aortocaval node 25 x 36 mm previously 18 x 9 mm.  3-71 left retroperitoneal node 31 x 26 mm on the previous exam 17 x 11 mm. Right lower quadrant mesenteric adenopathy 3-78 25 x 30 mm previously 14 x 10  mm. VASCULATURE: No aneurysm or dissection. REPRODUCTIVE ORGANS: Uterus and ovaries are unremarkable to  URINARY BLADDER: No mass or calculus. BONES: No destructive bone lesion. ABDOMINAL WALL: No mass or hernia. ADDITIONAL COMMENTS: N/A  IMPRESSION  Imaging findings are consistent with interval progression of disease. Extensive increased mesenteric and retroperitoneal lymphadenopathy with index  lesion measurements as described above. 12/10/21 PET:  FINDINGS:  HEAD/NECK: No apparent foci of abnormal hypermetabolism. Cerebral evaluation is  limited by normal intense activity. CHEST: Left supraclavicular adenopathy SUV 7.5, previous 4.2. New left internal  mammary SUV 3.3. Right diaphragmatic node with SUV 5.  ABDOMEN/PELVIS: Extensive new hypermetabolic and enlarged nodes: upper abdomen  (SUV 9), mesentery, retrocrural, retroperitoneal, right pelvic (SUV 6) and right  groin (SUV 4.5).  A previously measured left aortic node is SUV 6.4, previous  5.5. No splenic activity. SKELETON: No foci of abnormal hypermetabolism in the axial and visualized  appendicular skeleton. IMPRESSION  New/progressed multi level bill disease (Deauville 5).    Each FDG-avid (or previously FDG avid) lesion is rated independently. Reference values:  Mediastinal blood pool: 1.9 SUV  Liver (background): 2.2 SUV      Assessment & Plan:   Patti Harris is a 49 y.o. female with Depression comes in for evaluation and management of Follicular lymphoma. 1. Follicular lymphoma / Fatigue:   Stage III (based on L supraclav and abd/pelvis LAD). Normal LDH and no cytopenias or B symptoms present. Discussed that this is an indolent lymphoma which does not necessarily recommend treatment at time of diagnosis. Patients are observed with H&P and labs every 3-6 months. Treatment is for those who develop B symptoms, cytopenias or bulky disease (LN >7cm). At this time, I am unclear whether patient's symptoms are related to the disease. Furthermore, there are no \"bulky\" LNs > 7cm, but there is a 4cm LN in the right retroperitoneal LN region which if it increased significantly in size could risk of compression of renal vasculature. Bone marrow biopsy shows < 1% monoclonal CD10 positive B cell population (seen on flow cytometry), which is considered negative for BM involvement. PET 2/2020 showed only mildly hypermetabolic uptake with max SUV of 5.5. Previously discussed that treatment options for Stage III, grade 1/2 FL include observation vs chemoimmunotherapy with BR regimen vs single agent Rituxan. Now with CT 11/2021 and 12/2021 PET imaging that show interval progression of mesenteric and retroperitoneal LAD. Pt also with worsening fatigue. At this time, I recommend treatment with BR regimen given local GI symptoms related to mesenteric LAD and fatigue. We discussed the risks and benefits of R-Bendamustine chemotherapy.   The potential side effects include, but are not limited to: nausea, vomiting, diarrhea, constipation, flu-like symptoms, infusion reaction, allergic reaction, flushing, taste changes, increased risk of infection, anemia, fatigue, alopecia, mucositis, myelosuppression, infertility and rarely, death. The patient has consented to begin therapy. Supportive medications include: ondansetron, prochlorperazine. -- Proceed with C2 of BR regimen today. -- Tentative plan for repeat PET 3 weeks after C3   -- Return in 4 weeks for C3    2. Depression / ADHD / Anxiety: On Buproprion, Fluoxetine, Vyvanse. -- Ativan 0.5mg PO in [facility]IC today. 3. Abdominal and back pain: Improved after cholecystectomy in Dec 2020. Likely unrelated to the lymphoma. We previously discussed that shon-menopausal state can cause GI upset as can factors such as diet, stress. Reviewed healthy options. 4. Diarrhea alternating with constipation: May be related to lymphoma vs irritable bowel syndrome. Advised daily miralax to prevent constipation. 5. Bruising:  Likely due to SSRI. No significant bleeding episodes. 6. Vaccine counseling:  Had Covid booster and flu shot 12/14/21.     7. Fatigue:   Likely related to #1 and treatment. Encouraged light aerobic exercise. Emotional well being: Pt is coping well with his/her disease and has excellent support. I appreciate the opportunity to participate in Ms. New Lu care. I personally saw and evaluated the patient and performed the key components of medical decision making. The history, physical exam, and documentation were performed by Deejay Jay NP. I reviewed and verified the above documentation and modified it as needed. Proceed with C2 of BR today. Pt did have side effects during infusion which were likely related to steroids and Rituximab. I expect her to do better today given it is her 2nd infusion.       Signed By: Zen Sharp MD     February 3, 2022

## 2022-02-03 ENCOUNTER — HOSPITAL ENCOUNTER (OUTPATIENT)
Dept: INFUSION THERAPY | Age: 52
Discharge: HOME | End: 2022-02-03
Payer: COMMERCIAL

## 2022-02-03 ENCOUNTER — OFFICE VISIT (OUTPATIENT)
Dept: ONCOLOGY | Age: 52
End: 2022-02-03
Payer: COMMERCIAL

## 2022-02-03 VITALS
RESPIRATION RATE: 16 BRPM | OXYGEN SATURATION: 98 % | TEMPERATURE: 98 F | BODY MASS INDEX: 23.52 KG/M2 | SYSTOLIC BLOOD PRESSURE: 98 MMHG | HEART RATE: 76 BPM | WEIGHT: 155.2 LBS | HEIGHT: 68 IN | DIASTOLIC BLOOD PRESSURE: 59 MMHG

## 2022-02-03 VITALS
WEIGHT: 155.2 LBS | BODY MASS INDEX: 23.52 KG/M2 | HEART RATE: 76 BPM | SYSTOLIC BLOOD PRESSURE: 104 MMHG | DIASTOLIC BLOOD PRESSURE: 70 MMHG | HEIGHT: 68 IN | OXYGEN SATURATION: 99 % | TEMPERATURE: 97 F

## 2022-02-03 DIAGNOSIS — C82.13 FOLLICULAR LYMPHOMA GRADE II OF INTRA-ABDOMINAL LYMPH NODES (HCC): Primary | ICD-10-CM

## 2022-02-03 DIAGNOSIS — Z51.11 CHEMOTHERAPY MANAGEMENT, ENCOUNTER FOR: ICD-10-CM

## 2022-02-03 DIAGNOSIS — K59.03 DRUG-INDUCED CONSTIPATION: ICD-10-CM

## 2022-02-03 DIAGNOSIS — R53.82 CHRONIC FATIGUE: ICD-10-CM

## 2022-02-03 DIAGNOSIS — F41.8 ANXIETY ABOUT HEALTH: ICD-10-CM

## 2022-02-03 LAB
ALBUMIN SERPL-MCNC: 3.9 G/DL (ref 3.5–5)
ALBUMIN/GLOB SERPL: 1.3 {RATIO} (ref 1.1–2.2)
ALP SERPL-CCNC: 82 U/L (ref 45–117)
ALT SERPL-CCNC: 32 U/L (ref 12–78)
ANION GAP SERPL CALC-SCNC: 3 MMOL/L (ref 5–15)
AST SERPL-CCNC: 26 U/L (ref 15–37)
BASOPHILS # BLD: 0.1 K/UL (ref 0–0.1)
BASOPHILS NFR BLD: 3 % (ref 0–1)
BILIRUB SERPL-MCNC: 0.5 MG/DL (ref 0.2–1)
BUN SERPL-MCNC: 11 MG/DL (ref 6–20)
BUN/CREAT SERPL: 15 (ref 12–20)
CALCIUM SERPL-MCNC: 8.8 MG/DL (ref 8.5–10.1)
CHLORIDE SERPL-SCNC: 108 MMOL/L (ref 97–108)
CO2 SERPL-SCNC: 28 MMOL/L (ref 21–32)
CREAT SERPL-MCNC: 0.73 MG/DL (ref 0.55–1.02)
DIFFERENTIAL METHOD BLD: ABNORMAL
EOSINOPHIL # BLD: 0.5 K/UL (ref 0–0.4)
EOSINOPHIL NFR BLD: 12 % (ref 0–7)
ERYTHROCYTE [DISTWIDTH] IN BLOOD BY AUTOMATED COUNT: 11.6 % (ref 11.5–14.5)
GLOBULIN SER CALC-MCNC: 3.1 G/DL (ref 2–4)
GLUCOSE SERPL-MCNC: 93 MG/DL (ref 65–100)
HCT VFR BLD AUTO: 41.3 % (ref 35–47)
HGB BLD-MCNC: 14.1 G/DL (ref 11.5–16)
IMM GRANULOCYTES # BLD AUTO: 0 K/UL (ref 0–0.04)
IMM GRANULOCYTES NFR BLD AUTO: 0 % (ref 0–0.5)
LYMPHOCYTES # BLD: 0.9 K/UL (ref 0.8–3.5)
LYMPHOCYTES NFR BLD: 19 % (ref 12–49)
MCH RBC QN AUTO: 32.6 PG (ref 26–34)
MCHC RBC AUTO-ENTMCNC: 34.1 G/DL (ref 30–36.5)
MCV RBC AUTO: 95.4 FL (ref 80–99)
MONOCYTES # BLD: 0.7 K/UL (ref 0–1)
MONOCYTES NFR BLD: 14 % (ref 5–13)
NEUTS SEG # BLD: 2.5 K/UL (ref 1.8–8)
NEUTS SEG NFR BLD: 52 % (ref 32–75)
NRBC # BLD: 0 K/UL (ref 0–0.01)
NRBC BLD-RTO: 0 PER 100 WBC
PLATELET # BLD AUTO: 212 K/UL (ref 150–400)
PMV BLD AUTO: 10.3 FL (ref 8.9–12.9)
POTASSIUM SERPL-SCNC: 4.2 MMOL/L (ref 3.5–5.1)
PROT SERPL-MCNC: 7 G/DL (ref 6.4–8.2)
RBC # BLD AUTO: 4.33 M/UL (ref 3.8–5.2)
SODIUM SERPL-SCNC: 139 MMOL/L (ref 136–145)
WBC # BLD AUTO: 4.7 K/UL (ref 3.6–11)

## 2022-02-03 PROCEDURE — 96413 CHEMO IV INFUSION 1 HR: CPT

## 2022-02-03 PROCEDURE — 99215 OFFICE O/P EST HI 40 MIN: CPT | Performed by: INTERNAL MEDICINE

## 2022-02-03 PROCEDURE — 74011250636 HC RX REV CODE- 250/636: Performed by: INTERNAL MEDICINE

## 2022-02-03 PROCEDURE — 74011250637 HC RX REV CODE- 250/637: Performed by: NURSE PRACTITIONER

## 2022-02-03 PROCEDURE — 36415 COLL VENOUS BLD VENIPUNCTURE: CPT

## 2022-02-03 PROCEDURE — 96411 CHEMO IV PUSH ADDL DRUG: CPT

## 2022-02-03 PROCEDURE — 85025 COMPLETE CBC W/AUTO DIFF WBC: CPT

## 2022-02-03 PROCEDURE — 96375 TX/PRO/DX INJ NEW DRUG ADDON: CPT

## 2022-02-03 PROCEDURE — 96415 CHEMO IV INFUSION ADDL HR: CPT

## 2022-02-03 PROCEDURE — 80053 COMPREHEN METABOLIC PANEL: CPT

## 2022-02-03 PROCEDURE — 74011250637 HC RX REV CODE- 250/637: Performed by: INTERNAL MEDICINE

## 2022-02-03 PROCEDURE — 74011000258 HC RX REV CODE- 258: Performed by: INTERNAL MEDICINE

## 2022-02-03 RX ORDER — HYDROCORTISONE SODIUM SUCCINATE 100 MG/2ML
100 INJECTION, POWDER, FOR SOLUTION INTRAMUSCULAR; INTRAVENOUS AS NEEDED
Status: ACTIVE | OUTPATIENT
Start: 2022-02-03 | End: 2022-02-03

## 2022-02-03 RX ORDER — DEXAMETHASONE SODIUM PHOSPHATE 100 MG/10ML
10 INJECTION INTRAMUSCULAR; INTRAVENOUS ONCE
Status: COMPLETED | OUTPATIENT
Start: 2022-02-03 | End: 2022-02-03

## 2022-02-03 RX ORDER — SODIUM CHLORIDE 0.9 % (FLUSH) 0.9 %
10 SYRINGE (ML) INJECTION AS NEEDED
Status: DISPENSED | OUTPATIENT
Start: 2022-02-03 | End: 2022-02-03

## 2022-02-03 RX ORDER — DIPHENHYDRAMINE HYDROCHLORIDE 50 MG/ML
50 INJECTION, SOLUTION INTRAMUSCULAR; INTRAVENOUS AS NEEDED
Status: ACTIVE | OUTPATIENT
Start: 2022-02-03 | End: 2022-02-03

## 2022-02-03 RX ORDER — DIPHENHYDRAMINE HYDROCHLORIDE 50 MG/ML
25 INJECTION, SOLUTION INTRAMUSCULAR; INTRAVENOUS AS NEEDED
Status: ACTIVE | OUTPATIENT
Start: 2022-02-03 | End: 2022-02-03

## 2022-02-03 RX ORDER — ACETAMINOPHEN 325 MG/1
650 TABLET ORAL ONCE
Status: COMPLETED | OUTPATIENT
Start: 2022-02-03 | End: 2022-02-03

## 2022-02-03 RX ORDER — SODIUM CHLORIDE 9 MG/ML
25 INJECTION, SOLUTION INTRAVENOUS CONTINUOUS
Status: DISPENSED | OUTPATIENT
Start: 2022-02-03 | End: 2022-02-03

## 2022-02-03 RX ORDER — ACETAMINOPHEN 325 MG/1
650 TABLET ORAL AS NEEDED
Status: ACTIVE | OUTPATIENT
Start: 2022-02-03 | End: 2022-02-03

## 2022-02-03 RX ORDER — LORAZEPAM 0.5 MG/1
0.5 TABLET ORAL ONCE
Status: COMPLETED | OUTPATIENT
Start: 2022-02-03 | End: 2022-02-03

## 2022-02-03 RX ORDER — HEPARIN 100 UNIT/ML
300-500 SYRINGE INTRAVENOUS AS NEEDED
Status: ACTIVE | OUTPATIENT
Start: 2022-02-03 | End: 2022-02-03

## 2022-02-03 RX ORDER — ONDANSETRON 2 MG/ML
8 INJECTION INTRAMUSCULAR; INTRAVENOUS AS NEEDED
Status: ACTIVE | OUTPATIENT
Start: 2022-02-03 | End: 2022-02-03

## 2022-02-03 RX ORDER — PALONOSETRON 0.05 MG/ML
0.25 INJECTION, SOLUTION INTRAVENOUS ONCE
Status: COMPLETED | OUTPATIENT
Start: 2022-02-03 | End: 2022-02-03

## 2022-02-03 RX ORDER — SODIUM CHLORIDE 9 MG/ML
10 INJECTION INTRAMUSCULAR; INTRAVENOUS; SUBCUTANEOUS AS NEEDED
Status: ACTIVE | OUTPATIENT
Start: 2022-02-03 | End: 2022-02-03

## 2022-02-03 RX ORDER — DIPHENHYDRAMINE HYDROCHLORIDE 50 MG/ML
50 INJECTION, SOLUTION INTRAMUSCULAR; INTRAVENOUS ONCE
Status: COMPLETED | OUTPATIENT
Start: 2022-02-03 | End: 2022-02-03

## 2022-02-03 RX ADMIN — DEXAMETHASONE SODIUM PHOSPHATE 10 MG: 10 INJECTION INTRAMUSCULAR; INTRAVENOUS at 13:45

## 2022-02-03 RX ADMIN — BENDAMUSTINE HYDROCHLORIDE 165 MG: 25 INJECTION, SOLUTION INTRAVENOUS at 14:26

## 2022-02-03 RX ADMIN — ACETAMINOPHEN 650 MG: 325 TABLET ORAL at 09:17

## 2022-02-03 RX ADMIN — DIPHENHYDRAMINE HYDROCHLORIDE 50 MG: 50 INJECTION INTRAMUSCULAR; INTRAVENOUS at 09:21

## 2022-02-03 RX ADMIN — SODIUM CHLORIDE 683 MG: 9 INJECTION, SOLUTION INTRAVENOUS at 10:10

## 2022-02-03 RX ADMIN — PALONOSETRON 0.25 MG: 0.05 INJECTION, SOLUTION INTRAVENOUS at 13:44

## 2022-02-03 RX ADMIN — LORAZEPAM 0.5 MG: 0.5 TABLET ORAL at 09:17

## 2022-02-03 RX ADMIN — SODIUM CHLORIDE 25 ML/HR: 9 INJECTION, SOLUTION INTRAVENOUS at 09:21

## 2022-02-03 NOTE — PROGRESS NOTES
hospitals Progress Note    Date: February 3, 2022    Name: Jono Whittaker    MRN: [id number]         : 1970    Ms. Man Arrived ambulatory and in no distress for cycle 2 day 1 of Ruxience Bendamustine regimen. Assessment was completed, no acute issues at this time, patient has had mild nausea and constipation. Left arm PIV established by ANAND Bello accessed without difficulty, labs drawn and in process. Chemotherapy Flowsheet 2/3/2022   Cycle C2D1   Date 2/3/2022   Drug / Regimen Ruxience + Bendamustine     Pre Meds given   Notes given         Proceeded to appt with Dr. Emmett Eubanks    Ms. Man's vitals were reviewed. Visit Vitals  /70 (BP 1 Location: Right upper arm, BP Patient Position: Sitting)   Pulse 76   Temp 97 °F (36.1 °C)   Resp 18   Ht 5' 7.5\" (1.715 m)   Wt 70.4 kg (155 lb 3.2 oz)   SpO2 99%   Breastfeeding No   BMI 23.95 kg/m²       Lab results were obtained and reviewed. Recent Results (from the past 12 hour(s))   CBC WITH AUTOMATED DIFF    Collection Time: 22  8:13 AM   Result Value Ref Range    WBC 4.7 3.6 - 11.0 K/uL    RBC 4.33 3.80 - 5.20 M/uL    HGB 14.1 11.5 - 16.0 g/dL    HCT 41.3 35.0 - 47.0 %    MCV 95.4 80.0 - 99.0 FL    MCH 32.6 26.0 - 34.0 PG    MCHC 34.1 30.0 - 36.5 g/dL    RDW 11.6 11.5 - 14.5 %    PLATELET 774 837 - 416 K/uL    MPV 10.3 8.9 - 12.9 FL    NRBC 0.0 0  WBC    ABSOLUTE NRBC 0.00 0.00 - 0.01 K/uL    NEUTROPHILS 52 32 - 75 %    LYMPHOCYTES 19 12 - 49 %    MONOCYTES 14 (H) 5 - 13 %    EOSINOPHILS 12 (H) 0 - 7 %    BASOPHILS 3 (H) 0 - 1 %    IMMATURE GRANULOCYTES 0 0.0 - 0.5 %    ABS. NEUTROPHILS 2.5 1.8 - 8.0 K/UL    ABS. LYMPHOCYTES 0.9 0.8 - 3.5 K/UL    ABS. MONOCYTES 0.7 0.0 - 1.0 K/UL    ABS. EOSINOPHILS 0.5 (H) 0.0 - 0.4 K/UL    ABS. BASOPHILS 0.1 0.0 - 0.1 K/UL    ABS. IMM.  GRANS. 0.0 0.00 - 0.04 K/UL    DF AUTOMATED     METABOLIC PANEL, COMPREHENSIVE    Collection Time: 22  8:13 AM   Result Value Ref Range    Sodium 139 136 - 145 mmol/L    Potassium 4.2 3.5 - 5.1 mmol/L    Chloride 108 97 - 108 mmol/L    CO2 28 21 - 32 mmol/L    Anion gap 3 (L) 5 - 15 mmol/L    Glucose 93 65 - 100 mg/dL    BUN 11 6 - 20 MG/DL    Creatinine 0.73 0.55 - 1.02 MG/DL    BUN/Creatinine ratio 15 12 - 20      GFR est AA >60 >60 ml/min/1.73m2    GFR est non-AA >60 >60 ml/min/1.73m2    Calcium 8.8 8.5 - 10.1 MG/DL    Bilirubin, total 0.5 0.2 - 1.0 MG/DL    ALT (SGPT) 32 12 - 78 U/L    AST (SGOT) 26 15 - 37 U/L    Alk. phosphatase 82 45 - 117 U/L    Protein, total 7.0 6.4 - 8.2 g/dL    Albumin 3.9 3.5 - 5.0 g/dL    Globulin 3.1 2.0 - 4.0 g/dL    A-G Ratio 1.3 1.1 - 2.2         Pre-medications  were administered as ordered and chemotherapy was initiated.      Medications Administered     0.9% sodium chloride infusion     Admin Date  02/03/2022 Action  New Bag Dose  25 mL/hr Rate  25 mL/hr Route  IntraVENous Administered By  Shanta Mccartney          acetaminophen (TYLENOL) tablet 650 mg     Admin Date  02/03/2022 Action  Given Dose  650 mg Route  Oral Administered By  Shanta Mccartney          bendamustine 165 mg in 0.9% sodium chloride 50 mL, overfill volume 5 mL chemo infusion     Admin Date  02/03/2022 Action  New Bag Dose  165 mg Rate  369.6 mL/hr Route  IntraVENous Administered By  Shanta Mccartney          dexamethasone (DECADRON) 10 mg/mL injection 10 mg     Admin Date  02/03/2022 Action  Given Dose  10 mg Route  IntraVENous Administered By  Shanta Mccartney          diphenhydrAMINE (BENADRYL) injection 50 mg     Admin Date  02/03/2022 Action  Given Dose  50 mg Route  IntraVENous Administered By  Shanta Mccartney          LORazepam (ATIVAN) tablet 0.5 mg     Admin Date  02/03/2022 Action  Given Dose  0.5 mg Route  Oral Administered By  Shanta Mccartney          palonosetron HCl (ALOXI) injection 0.25 mg     Admin Date  02/03/2022 Action  Given Dose  0.25 mg Route  IntraVENous Administered By  Shanta Mccartney          riTUXimab-pvvr (RUXIENCE) 683 mg in 0.9% sodium chloride 683 mL infusion     Admin Date  02/03/2022 Action  New Bag Dose  683 mg Rate   Route  IntraVENous Administered By  Brandon Bis Date  02/03/2022 Action  Rate Change Dose   Rate  100 mL/hr Route  IntraVENous Administered By  Brandon Bis Date  02/03/2022 Action  Rate Change Dose   Rate  150 mL/hr Route  IntraVENous Administered By  Brandon Bis Date  02/03/2022 Action  Rate Change Dose   Rate  200 mL/hr Route  IntraVENous Administered By  Brandon Bis Date  02/03/2022 Action  Rate Change Dose   Rate  250 mL/hr Route  IntraVENous Administered By  Brandon Bis Date  02/03/2022 Action  Rate Change Dose   Rate  300 mL/hr Route  IntraVENous Administered By  Brandon Bis Date  02/03/2022 Action  Rate Change Dose   Rate  350 mL/hr Route  IntraVENous Administered By  Tyshawn Mehta                  Ms. Man tolerated treatment well and was discharged from [place] [id number] in stable condition. She is to return on 2/4/22 for her next appointment.     Marshall Blanchard  February 3, 2022

## 2022-02-03 NOTE — PROGRESS NOTES
Tayo Boyd is a 47 y.o. female here for follow up of lymphoma. Patient with complaints of low back pain, rates as a 4 out of 10.

## 2022-02-04 ENCOUNTER — HOSPITAL ENCOUNTER (OUTPATIENT)
Dept: INFUSION THERAPY | Age: 52
Discharge: HOME | End: 2022-02-04
Payer: COMMERCIAL

## 2022-02-04 VITALS
HEIGHT: 68 IN | SYSTOLIC BLOOD PRESSURE: 114 MMHG | TEMPERATURE: 97.7 F | HEART RATE: 78 BPM | DIASTOLIC BLOOD PRESSURE: 61 MMHG | RESPIRATION RATE: 16 BRPM | OXYGEN SATURATION: 97 % | BODY MASS INDEX: 23.52 KG/M2 | WEIGHT: 155.2 LBS

## 2022-02-04 DIAGNOSIS — C82.13 FOLLICULAR LYMPHOMA GRADE II OF INTRA-ABDOMINAL LYMPH NODES (HCC): Primary | ICD-10-CM

## 2022-02-04 PROCEDURE — 96409 CHEMO IV PUSH SNGL DRUG: CPT

## 2022-02-04 PROCEDURE — 96375 TX/PRO/DX INJ NEW DRUG ADDON: CPT

## 2022-02-04 PROCEDURE — 74011000258 HC RX REV CODE- 258: Performed by: INTERNAL MEDICINE

## 2022-02-04 PROCEDURE — 74011250636 HC RX REV CODE- 250/636: Performed by: INTERNAL MEDICINE

## 2022-02-04 RX ORDER — SODIUM CHLORIDE 9 MG/ML
25 INJECTION, SOLUTION INTRAVENOUS CONTINUOUS
Status: DISPENSED | OUTPATIENT
Start: 2022-02-04 | End: 2022-02-04

## 2022-02-04 RX ORDER — DEXAMETHASONE SODIUM PHOSPHATE 100 MG/10ML
10 INJECTION INTRAMUSCULAR; INTRAVENOUS ONCE
Status: COMPLETED | OUTPATIENT
Start: 2022-02-04 | End: 2022-02-04

## 2022-02-04 RX ADMIN — SODIUM CHLORIDE 25 ML/HR: 9 INJECTION, SOLUTION INTRAVENOUS at 11:44

## 2022-02-04 RX ADMIN — BENDAMUSTINE HYDROCHLORIDE 165 MG: 25 INJECTION, SOLUTION INTRAVENOUS at 12:27

## 2022-02-04 RX ADMIN — DEXAMETHASONE SODIUM PHOSPHATE 10 MG: 10 INJECTION INTRAMUSCULAR; INTRAVENOUS at 11:46

## 2022-02-17 ENCOUNTER — HOSPITAL ENCOUNTER (OUTPATIENT)
Dept: NON INVASIVE DIAGNOSTICS | Age: 52
Discharge: HOME | End: 2022-02-17
Attending: NURSE PRACTITIONER
Payer: COMMERCIAL

## 2022-02-17 ENCOUNTER — HOSPITAL ENCOUNTER (OUTPATIENT)
Dept: GENERAL RADIOLOGY | Age: 52
Discharge: HOME | End: 2022-02-17
Payer: COMMERCIAL

## 2022-02-17 ENCOUNTER — TELEPHONE (OUTPATIENT)
Dept: ONCOLOGY | Age: 52
End: 2022-02-17

## 2022-02-17 DIAGNOSIS — G89.29 CHRONIC BILATERAL LOW BACK PAIN WITHOUT SCIATICA: ICD-10-CM

## 2022-02-17 DIAGNOSIS — R07.9 CHEST PAIN, UNSPECIFIED TYPE: Primary | ICD-10-CM

## 2022-02-17 DIAGNOSIS — R07.9 CHEST PAIN, UNSPECIFIED TYPE: ICD-10-CM

## 2022-02-17 DIAGNOSIS — M54.50 CHRONIC BILATERAL LOW BACK PAIN WITHOUT SCIATICA: ICD-10-CM

## 2022-02-17 DIAGNOSIS — M54.50 ACUTE BILATERAL LOW BACK PAIN WITHOUT SCIATICA: ICD-10-CM

## 2022-02-17 DIAGNOSIS — R06.02 SOB (SHORTNESS OF BREATH): ICD-10-CM

## 2022-02-17 DIAGNOSIS — C82.13 FOLLICULAR LYMPHOMA GRADE II OF INTRA-ABDOMINAL LYMPH NODES (HCC): Primary | ICD-10-CM

## 2022-02-17 PROCEDURE — 93005 ELECTROCARDIOGRAM TRACING: CPT

## 2022-02-17 PROCEDURE — 71046 X-RAY EXAM CHEST 2 VIEWS: CPT

## 2022-02-17 RX ORDER — SODIUM CHLORIDE 0.9 % (FLUSH) 0.9 %
5-10 SYRINGE (ML) INJECTION AS NEEDED
OUTPATIENT
Start: 2022-02-18

## 2022-02-17 RX ORDER — HEPARIN 100 UNIT/ML
500 SYRINGE INTRAVENOUS AS NEEDED
OUTPATIENT
Start: 2022-02-18

## 2022-02-17 RX ORDER — SODIUM CHLORIDE 9 MG/ML
10 INJECTION INTRAMUSCULAR; INTRAVENOUS; SUBCUTANEOUS AS NEEDED
OUTPATIENT
Start: 2022-02-18

## 2022-02-17 NOTE — TELEPHONE ENCOUNTER
[address] [facility] [place]  [phone]      02/17/22 2:47 PM Call placed to patient regarding MyChart message. Patient states she has been experiencing chest pressure this week that has gotten progressively worse. Pain constant, but worse when lying down. Denies nausea and headaches. Had episode of SOB when walking this morning. Patient also states she feels as if two veins going up her neck are full of pressure--she describes them as feeling like a hose that's about to burst. Denies any dizziness. Patient has complaints of back pain and pressure in kidney area as well. Patient states that she has been participating in PT. When PT presses on area of upper back, pain radiates to upper abdomen near left side of rib cage, causing a pushing/pulling sensation. Patient denies urinary frequency, urgency, and hematuria. Also denies fevers. Advised this nurse would forward above to Dr. Kaity Flores and Bettye Padron and call patient back with further recommendations. She voiced understanding.     ----- Message -----  From: Alberto Amor  Sent: 2/17/2022   2:35 PM EST  To: Med Onc [address] [facility] Nurs[facility]  Subject: Chest pressure and shortness of breath           Good afternoon Dr. Kaity Flores,   I have been having a lot of chest pressure and noticed shortness of breath today in particular. It feels like my two veins going up my neck are full of pressure. I haven't been dizzy or anything but just feel this strong pressure that seems to have gotten worse this week. I've also had some weird pressure in my back and kidney area. That's hard to describe over email but am wondering if I should come in to see you or just ride this out until our next appt in early March? Please let me know or give me a call at your earliest convenience. Thank you. 3:17 PM Called patient. Per Bettye Padron, advised that patient have chest x-ray and EKG completed today and then office visit tomorrow to further evaluate.  Requested that patient attempt to have urinalysis done today; however, if unable to make it to lab can add onto [facility] schedule tomorrow. Patient voiced understanding and agreeable to plan. She states she will go to [address]. Scheduled for office visit tomorrow, 02/18, at 9:45 AM. Also advised that patient seek care in ED if patient worsens prior to office visit tomorrow. She voiced understanding.

## 2022-02-18 ENCOUNTER — TELEPHONE (OUTPATIENT)
Dept: ONCOLOGY | Age: 52
End: 2022-02-18

## 2022-02-18 ENCOUNTER — OFFICE VISIT (OUTPATIENT)
Dept: ONCOLOGY | Age: 52
End: 2022-02-18
Payer: COMMERCIAL

## 2022-02-18 ENCOUNTER — HOSPITAL ENCOUNTER (OUTPATIENT)
Dept: INFUSION THERAPY | Age: 52
Discharge: HOME | End: 2022-02-18
Payer: COMMERCIAL

## 2022-02-18 VITALS
OXYGEN SATURATION: 96 % | RESPIRATION RATE: 16 BRPM | SYSTOLIC BLOOD PRESSURE: 116 MMHG | HEIGHT: 68 IN | DIASTOLIC BLOOD PRESSURE: 76 MMHG | HEART RATE: 98 BPM | BODY MASS INDEX: 23.64 KG/M2 | WEIGHT: 156 LBS

## 2022-02-18 DIAGNOSIS — R06.02 SOB (SHORTNESS OF BREATH): Primary | ICD-10-CM

## 2022-02-18 DIAGNOSIS — F41.8 ANXIETY ABOUT HEALTH: ICD-10-CM

## 2022-02-18 DIAGNOSIS — R09.89 PULMONARY HYPERINFLATION: Primary | ICD-10-CM

## 2022-02-18 DIAGNOSIS — C82.13 FOLLICULAR LYMPHOMA GRADE II OF INTRA-ABDOMINAL LYMPH NODES (HCC): ICD-10-CM

## 2022-02-18 DIAGNOSIS — R06.02 SOB (SHORTNESS OF BREATH): ICD-10-CM

## 2022-02-18 DIAGNOSIS — M41.9 SCOLIOSIS OF THORACIC SPINE, UNSPECIFIED SCOLIOSIS TYPE: ICD-10-CM

## 2022-02-18 LAB
APPEARANCE UR: CLEAR
ATRIAL RATE: 75 BPM
BACTERIA URNS QL MICRO: NEGATIVE /HPF
BILIRUB UR QL: NEGATIVE
CALCULATED P AXIS, ECG09: 63 DEGREES
CALCULATED R AXIS, ECG10: 7 DEGREES
CALCULATED T AXIS, ECG11: 46 DEGREES
COLOR UR: ABNORMAL
D DIMER PPP FEU-MCNC: 0.46 MG/L FEU (ref 0–0.65)
DIAGNOSIS, 93000: NORMAL
EPITH CASTS URNS QL MICRO: ABNORMAL /LPF
ERYTHROCYTE [DISTWIDTH] IN BLOOD BY AUTOMATED COUNT: 11.7 % (ref 11.5–14.5)
GLUCOSE UR STRIP.AUTO-MCNC: NEGATIVE MG/DL
HCT VFR BLD AUTO: 37.5 % (ref 35–47)
HGB BLD-MCNC: 13.1 G/DL (ref 11.5–16)
HGB UR QL STRIP: NEGATIVE
KETONES UR QL STRIP.AUTO: NEGATIVE MG/DL
LEUKOCYTE ESTERASE UR QL STRIP.AUTO: ABNORMAL
MCH RBC QN AUTO: 32.7 PG (ref 26–34)
MCHC RBC AUTO-ENTMCNC: 34.9 G/DL (ref 30–36.5)
MCV RBC AUTO: 93.5 FL (ref 80–99)
NITRITE UR QL STRIP.AUTO: NEGATIVE
NRBC # BLD: 0 K/UL (ref 0–0.01)
NRBC BLD-RTO: 0 PER 100 WBC
P-R INTERVAL, ECG05: 136 MS
PH UR STRIP: 7 [PH] (ref 5–8)
PLATELET # BLD AUTO: 316 K/UL (ref 150–400)
PMV BLD AUTO: 8.9 FL (ref 8.9–12.9)
PROT UR STRIP-MCNC: NEGATIVE MG/DL
Q-T INTERVAL, ECG07: 404 MS
QRS DURATION, ECG06: 98 MS
QTC CALCULATION (BEZET), ECG08: 451 MS
RBC # BLD AUTO: 4.01 M/UL (ref 3.8–5.2)
RBC #/AREA URNS HPF: ABNORMAL /HPF (ref 0–5)
SP GR UR REFRACTOMETRY: <1.005 (ref 1–1.03)
UA: UC IF INDICATED,UAUC: ABNORMAL
UROBILINOGEN UR QL STRIP.AUTO: 0.2 EU/DL (ref 0.2–1)
VENTRICULAR RATE, ECG03: 75 BPM
WBC # BLD AUTO: 4.7 K/UL (ref 3.6–11)
WBC URNS QL MICRO: ABNORMAL /HPF (ref 0–4)

## 2022-02-18 PROCEDURE — 85027 COMPLETE CBC AUTOMATED: CPT

## 2022-02-18 PROCEDURE — 81001 URINALYSIS AUTO W/SCOPE: CPT

## 2022-02-18 PROCEDURE — 36415 COLL VENOUS BLD VENIPUNCTURE: CPT

## 2022-02-18 PROCEDURE — 99214 OFFICE O/P EST MOD 30 MIN: CPT | Performed by: INTERNAL MEDICINE

## 2022-02-18 PROCEDURE — 85379 FIBRIN DEGRADATION QUANT: CPT

## 2022-02-18 RX ORDER — LIDOCAINE 50 MG/G
1 PATCH TOPICAL EVERY 12 HOURS
Qty: 10 EACH | Refills: 0 | Status: SHIPPED | OUTPATIENT
Start: 2022-02-18 | End: 2022-05-31 | Stop reason: ALTCHOICE

## 2022-02-18 NOTE — PROGRESS NOTES
[address] [facility] at [facility]  [phone]    Hematology / Oncology Established Visit    Reason for Visit:   Taras Bethea is a 47 y.o. female who is seen for follow up of lymphoma. PCP is Dr. Uzma Sky. Hematology Oncology Treatment History:     Diagnosis: Follicular lymphoma    Stage: III; FLIPI2 low risk    Pathology:   -1/20/20 Right inguinal LN excision: Follicular lymphoma, grade 1-2, follicular pattern, LN68 positive. Comment:   The LN specimen demonstrates increased follicles, lacking normal germinal centers. IHC markers are performed with good controls. Tumor cells are positive for CD20 and BCL2. CD3 marks background T cells. Cyclin D1 is negative in the lymphoid population. CD21 highlights intact dendritic meshworks. Flow cytometry shows positive expression of CD10 and kappa LC restriction. Ki-67 is pending. Focally there are a few follicles with greater than fifteen centroblasts per HPF; however the majority of follicles have less than 15 centroblasts/HPF, consistent with grade 1-2. Diffuse areas are not seen.    -2/5/20 Bone marrow biopsy: Normocellular marrow with multilineage hematopoiesis and <1% involvement by a CD10 positive B-cell lymphoproliferative disorder. Negative for immunophenotypic or morphologic evidence of dysplasia no increase in blasts. Comment   Flow cytometric analysis reveals a small population of CD10 positive clonal B cells compatible with involvement by the patient's recently diagnosed follicular lymphoma. FISH studies are pending and will be reported. Prior Treatment: None    Current Treatment: Bendamustine-Rituximab x 6 cycles, started 1/2022    History of Present Illness:   Taras Bethea is a 47 y.o. female who comes in for evaluation of Follicular lymphoma. She states she has had vague symptoms for several years as follows. Pt reports h/o pruritus which started 6 yrs ago, which finally subsided with Zyrtec.  She saw a hematologist at that time, who told her symptoms might surface as a blood disorder in the future. In 2016, pt went to see her Gyn (Dr. Bright Monday) for abdominal cramping. Workup normal at time time. In summer of 2019, pt developed fatigue as well as same of the chronic pain in abdominal area. In fall of 2019, she developed memory issues with work. She went to see her Psychiatrist due to these symptoms and switched her from generic Wellbutrin to brand name Wellbutrin. No improvement after 3-4 weeks. She was okay with sleeping during that time, but continued to have severe fatigue out of character for her. She went to see her PCP and described a right inguinal LN enlargement. She went to see a surgeon, Dr. Obey Arndt. Ultrasound of that region did demonstrate an enlarged LN. FNA was inconclusive. Core needle biopsy was suspicious for B cell lymphoma. Excisional LN biopsy 7/29/42 revealed follicular lymphoma. CT of chest/abd/pelvis was notable for retroperitoneal, pelvic and portacaval LAD. Pt also reports left hip pain, left foot tingling. No fevers, chills, weight loss. She does have occasional sweats more notable a few months ago, but not drenching or nightly. Patient underwent staging bone marrow biopsy and PET scan. Previously saw [facility] for a 2nd opinion. She was told her GI symptoms are not related to lymphoma. Interval History:  Patient here for urgent follow up. Experiencing chest and upper back pressure this week that has gotten progressively worse. Pain constant, but worse when lying flat on the ground. Has SOB feeling like she can't catch her breath when she lies flat at times. This does not occur when she is lying in bed or when she is doing the exercises at PT, but occurs when she repeats the PT exercises at home. Reports increased dyspnea on her daily walks. No pleuritic CP currently.   Feels as if two veins going up her neck are full of pressure--she describes them as feeling like a hose that's about to burst. Has complaints of lumbar back pain and pressure in kidney area as well. Recently started PT and massage. When pressure is applied under left scapula, she experiences deferred pain in abdominal and rib cage area. Denies urinary frequency, urgency, and hematuria. PMHx: Chronic pain, Depression, Fibrocystic breast  PSurgHx: None aside from bilat breast biopsy and wisdom teeth extraction  SHx: Quit smoking 1/2000. Drinks 5 glasses of wine per week. FHx: Mother had breast cancer age 61. Father had CVA. Review of Systems: A complete review of systems was obtained, negative except as described above. Physical Exam:     Visit Vitals  /76   Pulse 98   Resp 16   Ht 5' 7.5\" (1.715 m)   Wt 156 lb (70.8 kg)   SpO2 96%   BMI 24.07 kg/m²     ECOG PS: 0  General: no distress  Eyes: anicteric sclerae  HENT: oropharynx clear  Neck: supple  Lymphatic: no cervical, axillary adenopathy; prior R inguinal LN and L supraclavicular LN no longer evident  Respiratory: normal respiratory effort  CV: no peripheral edema  GI: soft, nontender, nondistended, no masses  MSK: TTP along thoracic spine and under right scapula  Skin: no rashes; no ecchymoses; no petechiae      Results:     Lab Results   Component Value Date/Time    WBC 4.7 02/03/2022 08:13 AM    HGB 14.1 02/03/2022 08:13 AM    HCT 41.3 02/03/2022 08:13 AM    PLATELET 537 03/92/0017 08:13 AM    MCV 95.4 02/03/2022 08:13 AM    ABS.  NEUTROPHILS 2.5 02/03/2022 08:13 AM     Lab Results   Component Value Date/Time    Sodium 139 02/03/2022 08:13 AM    Potassium 4.2 02/03/2022 08:13 AM    Chloride 108 02/03/2022 08:13 AM    CO2 28 02/03/2022 08:13 AM    Glucose 93 02/03/2022 08:13 AM    BUN 11 02/03/2022 08:13 AM    Creatinine 0.73 02/03/2022 08:13 AM    GFR est AA >60 02/03/2022 08:13 AM    GFR est non-AA >60 02/03/2022 08:13 AM    Calcium 8.8 02/03/2022 08:13 AM    Glucose (POC) 88 12/10/2021 07:07 AM     Lab Results   Component Value Date/Time Bilirubin, total 0.5 02/03/2022 08:13 AM    ALT (SGPT) 32 02/03/2022 08:13 AM    Alk. phosphatase 82 02/03/2022 08:13 AM    Protein, total 7.0 02/03/2022 08:13 AM    Albumin 3.9 02/03/2022 08:13 AM    Globulin 3.1 02/03/2022 08:13 AM     No results found for: IRON, FE, TIBC, IBCT, PSAT, FERR    No results found for: B12LT, FOL, RBCF  Lab Results   Component Value Date/Time    TSH 1.830 09/25/2019 12:45 PM     Lab Results   Component Value Date/Time    Hepatitis B surface Ag 0.40 01/06/2022 09:13 AM    Hepatitis B surface Ab <3.10 01/06/2022 09:13 AM    Hep B Core Ab, total Negative 01/06/2022 09:13 AM     12/2/21 LD: 170    Imaging:     Chest CT  1/24/20:  FINDINGS:  The normal-sized axillary lymph nodes are unchanged. THYROID: No nodule. MEDIASTINUM: No mass or lymphadenopathy. MARCE: No mass or lymphadenopathy. THORACIC AORTA: No aneurysm. MAIN PULMONARY ARTERY: Normal in caliber. TRACHEA/BRONCHI: Patent. ESOPHAGUS: No wall thickening or dilatation. HEART: Normal in size. PLEURA: No effusion or pneumothorax. LUNGS: Laterally in the left lower lobe on image 53 is a 2 to 3 mm pulmonary  nodule which is unchanged when compared with the examination of 6/27/2017. This  is a benign nodule and not significant. .  There are normal size lymph nodes adjacent to the descending thoracic aorta and  paraspinal region which have increased in size but remain normal in size. .  Abdomen and pelvis: There has been interval development of adenopathy. In the portacaval space there  is a lymph node that measures 17 mm in short axis. There are retroperitoneal  lymph nodes that are enlarged as well. The largest lymph node in the  retroperitoneum is posterior to the inferior vena cava beginning at the level of  the right renal artery this extends inferiorly. This measures 2.4 x 1.0 x 4.0  cm. There are also enlarged lymph nodes in the left aortic region largest  measuring 12 mm in short axis.   There are also lymph nodes posterior to the head of the pancreas and anterior to  the inferior vena cava which are enlarged the largest measures 10 mm in short  axis  There are enlarged lymph nodes in the interaortocaval space. Enlarged lymph  nodes along the right common iliac artery largest measuring approximately 12 mm  in short axis. There are enlarged lymph nodes in the right external iliac chain  largest measuring 9 mm. There is an enlarged lymph node along the right pelvic sidewall measuring 2.5 x  1.3 cm. There are postsurgical changes in the right inguinal region with one  tiny locule of air. There are residual lymph nodes in the right inguinal largest  measuring 10 mm. .  The uterus images normally. There is a 1 cm cyst in the right ovary. There is no free fluid. Review of the bone windows reveals no destructive lesions. The liver and gallbladder are unremarkable. The spleen is normal in size and  configuration. The pancreas and adrenal glands and kidneys are unremarkable. The aorta and inferior vena cava are unremarkable. IMPRESSION:  1. There is is adenopathy as described. There is adenopathy in the right  inguinal region, along the right pelvic sidewall, along the right iliac chain,  bilateral retroperitoneum, and in the portacaval space as well as posterior to  the pancreas head. 2. In the posterior mediastinum adjacent to the descending thoracic aorta are  lymph nodes that measure less than 1 cm in size but these have increased in the  interval.  3. Please see above text    PET 2/10/20:   FINDINGS:  HEAD/NECK: No apparent foci of abnormal hypermetabolism. Cerebral evaluation is  limited by normal intense activity. CHEST: Mildly hypermetabolic left supraclavicular lymph node, maximum SUV 4.2. ABDOMEN/PELVIS: Mildly hypermetabolic portacaval, aortocaval, and left  para-aortic lymph nodes are noted, maximum SUV 5.5 of an aortocaval lymph node.   Hypermetabolic right common iliac, right external iliac, right obturator, and  right inguinal lymph nodes.   SKELETON: No foci of abnormal hypermetabolism in the axial and visualized  appendicular skeleton. IMPRESSION: Mildly hypermetabolic left supraclavicular, abdominal,  retroperitoneal, and pelvic lymph nodes. Ch/abd/pelvis CT 5/7/20:  Lymph nodes in chest: There is no new mediastinal, hilar or axillary lymphadenopathy. Subcentimeter bilateral axillary lymph nodes are unchanged. Subcentimeter  posterior mediastinal lymph node abutting the descending thoracic aorta is  unchanged compared to prior CT dated January 2020. 1.3 cm x 1.1 cm left  supraclavicular lymph node is unchanged compared to prior CT dated January 2020. Lymph nodes in abdom/pelvis: There is a prominent portacaval lymph node measuring approximately  1.6 cm x 2.3 cm, unchanged compared to prior CT dated January 2020. There are multiple prominent and mildly enlarged retroperitoneal lymph nodes, unchanged  compared to prior CT dated January 2020. For example, there is a left  para-aortic retroperitoneal lymph node measures 1.2 cm x 1.6 cm, unchanged  compared to prior CT dated January 2020. As an additional example, there is a 9  mm x 1.3 cm aortocaval lymph node, unchanged compared to prior CT dated January 2020. Right iliac lymph nodes are unchanged compared to prior CT dated January 2020. For example, the largest right iliac lymph node measures 1.1 cm x 1.4 cm,  unchanged compared to prior CT dated January 2020. Right internal obturator  lymph node is unchanged compared to prior CT dated January 2020) measuring  approximately 2.8 cm x 1.4 cm. Prominent right inguinal lymph nodes are not  significantly changed in size. For example, there is a 1 cm x 1.4 cm right  inguinal lymph node, unchanged compared to prior CT dated January 2020. IMPRESSION: No interval change. Discussed with radiology - the right RP LN near R renal artery is unchanged.  The 4cm dimension is craniocaudal.    11/9/20 CT c/a/p:  FINDINGS:   LYMPH NODES: There is a prominent portacaval lymph node measuring approximately  1.8 x 2.0 cm, not significantly changed from prior measurements of 1.6 cm x 2.3  cm. There are  multiple prominent and mildly enlarged retroperitoneal lymph nodes, not  significantly changed from prior. For example, there is a left para-aortic  retroperitoneal lymph node that measures 1.1 x 1.7 cm, not significantly changed  from prior measurements of 1.2 x 1.6 and a 10 x 14 mm aortocaval lymph node, not  significantly changed from prior measurements of 9 x 13 mm. Right iliac lymph  nodes are little changed, though the largest has slightly increased in size  measuring 1.3 x 1.6 cm, previously 1.1 x 1.4 cm. Right internal obturator is not  significantly changed measuring 1.1 x 2.7 cm, previously 1.3 x 3.0 cm. Prominent  right inguinal lymph nodes are not significantly changed in size measuring 1.0 x  1.4 cm. IMPRESSION: No significant interval change apart from a single right common  iliac pelvic lymph nodes which shows slight increase in size by measurement from  1.1 x 1.4 cm to 1.3 x 1.6 cm.    11/18/21 CT ch/abd/pelvis:  LYMPH NODES: Extensive increased retroperitoneal and portacaval adenopathy. 3-67 aortocaval node 25 x 36 mm previously 18 x 9 mm.  3-71 left retroperitoneal node 31 x 26 mm on the previous exam 17 x 11 mm. Right lower quadrant mesenteric adenopathy 3-78 25 x 30 mm previously 14 x 10  mm. VASCULATURE: No aneurysm or dissection. REPRODUCTIVE ORGANS: Uterus and ovaries are unremarkable to  URINARY BLADDER: No mass or calculus. BONES: No destructive bone lesion. ABDOMINAL WALL: No mass or hernia. ADDITIONAL COMMENTS: N/A  IMPRESSION  Imaging findings are consistent with interval progression of disease. Extensive increased mesenteric and retroperitoneal lymphadenopathy with index  lesion measurements as described above. 12/10/21 PET:  FINDINGS:  HEAD/NECK: No apparent foci of abnormal hypermetabolism.  Cerebral evaluation is  limited by normal intense activity. CHEST: Left supraclavicular adenopathy SUV 7.5, previous 4.2. New left internal  mammary SUV 3.3. Right diaphragmatic node with SUV 5.  ABDOMEN/PELVIS: Extensive new hypermetabolic and enlarged nodes: upper abdomen  (SUV 9), mesentery, retrocrural, retroperitoneal, right pelvic (SUV 6) and right  groin (SUV 4.5). A previously measured left aortic node is SUV 6.4, previous  5.5. No splenic activity. SKELETON: No foci of abnormal hypermetabolism in the axial and visualized  appendicular skeleton. IMPRESSION  New/progressed multi level bill disease ([place] 5).    Each FDG-avid (or previously FDG avid) lesion is rated independently. Reference values:  Mediastinal blood pool: 1.9 SUV  Liver (background): 2.2 SUV    2/17/22 CXR  FINDINGS: The cardiac silhouette is normal in size. There is hyperaeration of  the lungs. There is no evidence of active lung disease. There is evidence of  mild, mid thoracic scoliosis convex to the right. IMPRESSION  Evidence of pulmonary hyperaeration. No evidence of active lung  disease. Assessment & Plan:   Naa Gonzales is a 47 y.o. female with Depression comes in for evaluation and management of Follicular lymphoma. 1. Chest and neck pressure / Back pain / SOB:   CXR negative for fluid, but showed pulmonary hyperaeration which is sometimes associated with asthma or COPD. EKG normal. Back pain likely due to thoracic scoliosis. The SOB with lying flat is likely related to scoliosis and uncomfortable pressure on certain points in her spine. Advised recreating PT environment by using padding (yoga mat or towel) behind neck or upper and mid back when completing the PT exercises at home. -- PFTs to determine cause of hyperaeration. -- Continue PT for back pain; Use towel or roll under back. Checking urinalysis. -- Lidocaine patches prn back pain. -- cbc to r/o anemia and d-dimer to r/o PE.      2. Follicular lymphoma / Fatigue:   Stage III (based on L supraclav and abd/pelvis LAD). Normal LDH and no cytopenias or B symptoms present. Discussed that this is an indolent lymphoma which does not necessarily recommend treatment at time of diagnosis. Patients are observed with H&P and labs every 3-6 months. Treatment is for those who develop B symptoms, cytopenias or bulky disease (LN >7cm). At this time, I am unclear whether patient's symptoms are related to the disease. Furthermore, there are no \"bulky\" LNs > 7cm, but there is a 4cm LN in the right retroperitoneal LN region which if it increased significantly in size could risk of compression of renal vasculature. Bone marrow biopsy shows < 1% monoclonal CD10 positive B cell population (seen on flow cytometry), which is considered negative for BM involvement. PET 2/2020 showed only mildly hypermetabolic uptake with max SUV of 5.5. Previously discussed that treatment options for Stage III, grade 1/2 FL include observation vs chemoimmunotherapy with BR regimen vs single agent Rituxan. Now with CT 11/2021 and 12/2021 PET imaging that show interval progression of mesenteric and retroperitoneal LAD. Pt also with worsening fatigue. At this time, I recommend treatment with BR regimen given local GI symptoms related to mesenteric LAD and fatigue. We discussed the risks and benefits of R-Bendamustine chemotherapy. The potential side effects include, but are not limited to: nausea, vomiting, diarrhea, constipation, flu-like symptoms, infusion reaction, allergic reaction, flushing, taste changes, increased risk of infection, anemia, fatigue, alopecia, mucositis, myelosuppression, infertility and rarely, death. The patient has consented to begin therapy. Supportive medications include: ondansetron, prochlorperazine. -- Completed C2 of BR regimen on 2/3/22.  -- Tentative plan for repeat PET 3 weeks after C3   -- Return in 2 weeks for C3, MD/NP visit. 3. Depression / ADHD / Anxiety:  On Buproprion, Fluoxetine, Vyvanse. 4. Abdominal pain: Improved after cholecystectomy in Dec 2020. Likely unrelated to the lymphoma. We previously discussed that shon-menopausal state can cause GI upset as can factors such as diet, stress. Reviewed healthy options. 5. Diarrhea alternating with constipation: May be related to lymphoma vs irritable bowel syndrome. Advised daily miralax to prevent constipation. 6. Bruising:  Likely due to SSRI. No significant bleeding episodes. 7. Vaccine counseling:  Had Covid booster and flu shot 12/14/21.     8. Fatigue:   Likely related to #1 and treatment. Encouraged light aerobic exercise. Emotional well being: Pt is coping well with his/her disease and has excellent support. I appreciate the opportunity to participate in Ms. Luis Rubio care. I personally saw and evaluated the patient and performed the key components of medical decision making. The history, physical exam, and documentation were performed by Sophia Duncan NP. I reviewed and verified the above documentation and modified it as needed.      Signed By: Daniele Stern MD     February 18, 2022

## 2022-02-18 NOTE — TELEPHONE ENCOUNTER
02/18/22 1:55 PM Called patient and advised ddimer is normal and no evidence of anemia. Urinalysis negative for bacteria or nitrates, did show moderate leuks which is likely due to some inflammation of the urinary tract. No indication for antibiotics at this time. Reviewed symptoms. Patient verbalized understanding.

## 2022-02-18 NOTE — PROGRESS NOTES
Chief Complaint   Patient presents with    Follow-up     Francisco Javier Brewer is a pleasant 47year old woman who presents as a follow up.  She reports back pain

## 2022-02-28 RX ORDER — DIPHENHYDRAMINE HYDROCHLORIDE 50 MG/ML
50 INJECTION, SOLUTION INTRAMUSCULAR; INTRAVENOUS AS NEEDED
Status: CANCELLED
Start: 2022-03-07

## 2022-02-28 RX ORDER — PALONOSETRON 0.05 MG/ML
0.25 INJECTION, SOLUTION INTRAVENOUS ONCE
Status: CANCELLED | OUTPATIENT
Start: 2022-03-07 | End: 2022-03-07

## 2022-02-28 RX ORDER — EPINEPHRINE 1 MG/ML
0.3 INJECTION, SOLUTION, CONCENTRATE INTRAVENOUS AS NEEDED
Status: CANCELLED | OUTPATIENT
Start: 2022-03-08

## 2022-02-28 RX ORDER — ALBUTEROL SULFATE 0.83 MG/ML
2.5 SOLUTION RESPIRATORY (INHALATION) AS NEEDED
Status: CANCELLED
Start: 2022-03-07

## 2022-02-28 RX ORDER — HYDROCORTISONE SODIUM SUCCINATE 100 MG/2ML
100 INJECTION, POWDER, FOR SOLUTION INTRAMUSCULAR; INTRAVENOUS AS NEEDED
Status: CANCELLED | OUTPATIENT
Start: 2022-03-07

## 2022-02-28 RX ORDER — DIPHENHYDRAMINE HYDROCHLORIDE 50 MG/ML
50 INJECTION, SOLUTION INTRAMUSCULAR; INTRAVENOUS AS NEEDED
Status: CANCELLED
Start: 2022-03-08

## 2022-02-28 RX ORDER — DIPHENHYDRAMINE HYDROCHLORIDE 50 MG/ML
25 INJECTION, SOLUTION INTRAMUSCULAR; INTRAVENOUS AS NEEDED
Status: CANCELLED
Start: 2022-03-08

## 2022-02-28 RX ORDER — SODIUM CHLORIDE 9 MG/ML
10 INJECTION INTRAMUSCULAR; INTRAVENOUS; SUBCUTANEOUS AS NEEDED
Status: CANCELLED | OUTPATIENT
Start: 2022-03-07

## 2022-02-28 RX ORDER — DEXAMETHASONE SODIUM PHOSPHATE 100 MG/10ML
10 INJECTION INTRAMUSCULAR; INTRAVENOUS ONCE
Status: CANCELLED | OUTPATIENT
Start: 2022-03-07 | End: 2022-03-07

## 2022-02-28 RX ORDER — ACETAMINOPHEN 325 MG/1
650 TABLET ORAL AS NEEDED
Status: CANCELLED
Start: 2022-03-07

## 2022-02-28 RX ORDER — SODIUM CHLORIDE 0.9 % (FLUSH) 0.9 %
10 SYRINGE (ML) INJECTION AS NEEDED
Status: CANCELLED | OUTPATIENT
Start: 2022-03-08

## 2022-02-28 RX ORDER — SODIUM CHLORIDE 9 MG/ML
25 INJECTION, SOLUTION INTRAVENOUS CONTINUOUS
Status: CANCELLED | OUTPATIENT
Start: 2022-03-08

## 2022-02-28 RX ORDER — ACETAMINOPHEN 325 MG/1
650 TABLET ORAL AS NEEDED
Status: CANCELLED
Start: 2022-03-08

## 2022-02-28 RX ORDER — ACETAMINOPHEN 325 MG/1
650 TABLET ORAL ONCE
Status: CANCELLED
Start: 2022-03-07 | End: 2022-03-07

## 2022-02-28 RX ORDER — SODIUM CHLORIDE 0.9 % (FLUSH) 0.9 %
10 SYRINGE (ML) INJECTION AS NEEDED
Status: CANCELLED | OUTPATIENT
Start: 2022-03-07

## 2022-02-28 RX ORDER — HEPARIN 100 UNIT/ML
300-500 SYRINGE INTRAVENOUS AS NEEDED
Status: CANCELLED
Start: 2022-03-07

## 2022-02-28 RX ORDER — SODIUM CHLORIDE 9 MG/ML
25 INJECTION, SOLUTION INTRAVENOUS CONTINUOUS
Status: CANCELLED | OUTPATIENT
Start: 2022-03-07

## 2022-02-28 RX ORDER — DEXAMETHASONE SODIUM PHOSPHATE 100 MG/10ML
10 INJECTION INTRAMUSCULAR; INTRAVENOUS ONCE
Status: CANCELLED | OUTPATIENT
Start: 2022-03-08 | End: 2022-03-08

## 2022-02-28 RX ORDER — DIPHENHYDRAMINE HYDROCHLORIDE 50 MG/ML
50 INJECTION, SOLUTION INTRAMUSCULAR; INTRAVENOUS ONCE
Status: CANCELLED
Start: 2022-03-07 | End: 2022-03-07

## 2022-02-28 RX ORDER — SODIUM CHLORIDE 9 MG/ML
10 INJECTION INTRAMUSCULAR; INTRAVENOUS; SUBCUTANEOUS AS NEEDED
Status: CANCELLED | OUTPATIENT
Start: 2022-03-08

## 2022-02-28 RX ORDER — DIPHENHYDRAMINE HYDROCHLORIDE 50 MG/ML
25 INJECTION, SOLUTION INTRAMUSCULAR; INTRAVENOUS AS NEEDED
Status: CANCELLED
Start: 2022-03-07

## 2022-02-28 RX ORDER — EPINEPHRINE 1 MG/ML
0.3 INJECTION, SOLUTION, CONCENTRATE INTRAVENOUS AS NEEDED
Status: CANCELLED | OUTPATIENT
Start: 2022-03-07

## 2022-02-28 RX ORDER — HEPARIN 100 UNIT/ML
300-500 SYRINGE INTRAVENOUS AS NEEDED
Status: CANCELLED
Start: 2022-03-08

## 2022-02-28 RX ORDER — ONDANSETRON 2 MG/ML
8 INJECTION INTRAMUSCULAR; INTRAVENOUS AS NEEDED
Status: CANCELLED | OUTPATIENT
Start: 2022-03-07

## 2022-02-28 RX ORDER — ONDANSETRON 2 MG/ML
8 INJECTION INTRAMUSCULAR; INTRAVENOUS AS NEEDED
Status: CANCELLED | OUTPATIENT
Start: 2022-03-08

## 2022-02-28 RX ORDER — ALBUTEROL SULFATE 0.83 MG/ML
2.5 SOLUTION RESPIRATORY (INHALATION) AS NEEDED
Status: CANCELLED
Start: 2022-03-08

## 2022-02-28 RX ORDER — HYDROCORTISONE SODIUM SUCCINATE 100 MG/2ML
100 INJECTION, POWDER, FOR SOLUTION INTRAMUSCULAR; INTRAVENOUS AS NEEDED
Status: CANCELLED | OUTPATIENT
Start: 2022-03-08

## 2022-03-01 RX ORDER — ALBUTEROL SULFATE 0.83 MG/ML
2.5 SOLUTION RESPIRATORY (INHALATION) AS NEEDED
Status: CANCELLED
Start: 2022-04-05

## 2022-03-01 RX ORDER — PALONOSETRON 0.05 MG/ML
0.25 INJECTION, SOLUTION INTRAVENOUS ONCE
Status: CANCELLED | OUTPATIENT
Start: 2022-04-04 | End: 2022-04-04

## 2022-03-01 RX ORDER — DIPHENHYDRAMINE HYDROCHLORIDE 50 MG/ML
50 INJECTION, SOLUTION INTRAMUSCULAR; INTRAVENOUS AS NEEDED
Status: CANCELLED
Start: 2022-04-04

## 2022-03-01 RX ORDER — SODIUM CHLORIDE 9 MG/ML
10 INJECTION INTRAMUSCULAR; INTRAVENOUS; SUBCUTANEOUS AS NEEDED
Status: CANCELLED | OUTPATIENT
Start: 2022-04-04

## 2022-03-01 RX ORDER — DIPHENHYDRAMINE HYDROCHLORIDE 50 MG/ML
50 INJECTION, SOLUTION INTRAMUSCULAR; INTRAVENOUS ONCE
Status: CANCELLED
Start: 2022-04-04 | End: 2022-04-04

## 2022-03-01 RX ORDER — ONDANSETRON 2 MG/ML
8 INJECTION INTRAMUSCULAR; INTRAVENOUS AS NEEDED
Status: CANCELLED | OUTPATIENT
Start: 2022-04-04

## 2022-03-01 RX ORDER — SODIUM CHLORIDE 9 MG/ML
25 INJECTION, SOLUTION INTRAVENOUS CONTINUOUS
Status: CANCELLED | OUTPATIENT
Start: 2022-04-05

## 2022-03-01 RX ORDER — DEXAMETHASONE SODIUM PHOSPHATE 100 MG/10ML
10 INJECTION INTRAMUSCULAR; INTRAVENOUS ONCE
Status: CANCELLED | OUTPATIENT
Start: 2022-04-04 | End: 2022-04-04

## 2022-03-01 RX ORDER — HEPARIN 100 UNIT/ML
300-500 SYRINGE INTRAVENOUS AS NEEDED
Status: CANCELLED
Start: 2022-04-04

## 2022-03-01 RX ORDER — HYDROCORTISONE SODIUM SUCCINATE 100 MG/2ML
100 INJECTION, POWDER, FOR SOLUTION INTRAMUSCULAR; INTRAVENOUS AS NEEDED
Status: CANCELLED | OUTPATIENT
Start: 2022-04-04

## 2022-03-01 RX ORDER — DEXAMETHASONE SODIUM PHOSPHATE 100 MG/10ML
10 INJECTION INTRAMUSCULAR; INTRAVENOUS ONCE
Status: CANCELLED | OUTPATIENT
Start: 2022-04-05 | End: 2022-04-05

## 2022-03-01 RX ORDER — ONDANSETRON 2 MG/ML
8 INJECTION INTRAMUSCULAR; INTRAVENOUS AS NEEDED
Status: CANCELLED | OUTPATIENT
Start: 2022-04-05

## 2022-03-01 RX ORDER — DIPHENHYDRAMINE HYDROCHLORIDE 50 MG/ML
50 INJECTION, SOLUTION INTRAMUSCULAR; INTRAVENOUS AS NEEDED
Status: CANCELLED
Start: 2022-04-05

## 2022-03-01 RX ORDER — SODIUM CHLORIDE 9 MG/ML
10 INJECTION INTRAMUSCULAR; INTRAVENOUS; SUBCUTANEOUS AS NEEDED
Status: CANCELLED | OUTPATIENT
Start: 2022-04-05

## 2022-03-01 RX ORDER — ACETAMINOPHEN 325 MG/1
650 TABLET ORAL AS NEEDED
Status: CANCELLED
Start: 2022-04-04

## 2022-03-01 RX ORDER — DIPHENHYDRAMINE HYDROCHLORIDE 50 MG/ML
25 INJECTION, SOLUTION INTRAMUSCULAR; INTRAVENOUS AS NEEDED
Status: CANCELLED
Start: 2022-04-05

## 2022-03-01 RX ORDER — HEPARIN 100 UNIT/ML
300-500 SYRINGE INTRAVENOUS AS NEEDED
Status: CANCELLED
Start: 2022-04-05

## 2022-03-01 RX ORDER — ACETAMINOPHEN 325 MG/1
650 TABLET ORAL ONCE
Status: CANCELLED
Start: 2022-04-04 | End: 2022-04-04

## 2022-03-01 RX ORDER — ALBUTEROL SULFATE 0.83 MG/ML
2.5 SOLUTION RESPIRATORY (INHALATION) AS NEEDED
Status: CANCELLED
Start: 2022-04-04

## 2022-03-01 RX ORDER — ACETAMINOPHEN 325 MG/1
650 TABLET ORAL AS NEEDED
Status: CANCELLED
Start: 2022-04-05

## 2022-03-01 RX ORDER — SODIUM CHLORIDE 0.9 % (FLUSH) 0.9 %
10 SYRINGE (ML) INJECTION AS NEEDED
Status: CANCELLED | OUTPATIENT
Start: 2022-04-05

## 2022-03-01 RX ORDER — DIPHENHYDRAMINE HYDROCHLORIDE 50 MG/ML
25 INJECTION, SOLUTION INTRAMUSCULAR; INTRAVENOUS AS NEEDED
Status: CANCELLED
Start: 2022-04-04

## 2022-03-01 RX ORDER — HYDROCORTISONE SODIUM SUCCINATE 100 MG/2ML
100 INJECTION, POWDER, FOR SOLUTION INTRAMUSCULAR; INTRAVENOUS AS NEEDED
Status: CANCELLED | OUTPATIENT
Start: 2022-04-05

## 2022-03-01 RX ORDER — EPINEPHRINE 1 MG/ML
0.3 INJECTION, SOLUTION, CONCENTRATE INTRAVENOUS AS NEEDED
Status: CANCELLED | OUTPATIENT
Start: 2022-04-05

## 2022-03-01 RX ORDER — SODIUM CHLORIDE 9 MG/ML
25 INJECTION, SOLUTION INTRAVENOUS CONTINUOUS
Status: CANCELLED | OUTPATIENT
Start: 2022-04-04

## 2022-03-01 RX ORDER — SODIUM CHLORIDE 0.9 % (FLUSH) 0.9 %
10 SYRINGE (ML) INJECTION AS NEEDED
Status: CANCELLED | OUTPATIENT
Start: 2022-04-04

## 2022-03-01 RX ORDER — EPINEPHRINE 1 MG/ML
0.3 INJECTION, SOLUTION, CONCENTRATE INTRAVENOUS AS NEEDED
Status: CANCELLED | OUTPATIENT
Start: 2022-04-04

## 2022-03-02 NOTE — PROGRESS NOTES
[id number] [facility] Oncology at [address] [place] [address]  [phone]    Hematology / Oncology Established Visit    Reason for Visit:   Nalini Kahn is a 47 y.o. female who is seen for follow up of lymphoma. PCP is Dr. Patrick Sutton. Hematology Oncology Treatment History:     Diagnosis: Follicular lymphoma    Stage: III; FLIPI2 low risk    Pathology:   -1/20/20 Right inguinal LN excision: Follicular lymphoma, grade 1-2, follicular pattern, OR38 positive. Comment:   The LN specimen demonstrates increased follicles, lacking normal germinal centers. IHC markers are performed with good controls. Tumor cells are positive for CD20 and BCL2. CD3 marks background T cells. Cyclin D1 is negative in the lymphoid population. CD21 highlights intact dendritic meshworks. Flow cytometry shows positive expression of CD10 and kappa LC restriction. Ki-67 is pending. Focally there are a few follicles with greater than fifteen centroblasts per HPF; however the majority of follicles have less than 15 centroblasts/HPF, consistent with grade 1-2. Diffuse areas are not seen.    -2/5/20 Bone marrow biopsy: Normocellular marrow with multilineage hematopoiesis and <1% involvement by a CD10 positive B-cell lymphoproliferative disorder. Negative for immunophenotypic or morphologic evidence of dysplasia no increase in blasts. Comment   Flow cytometric analysis reveals a small population of CD10 positive clonal B cells compatible with involvement by the patient's recently diagnosed follicular lymphoma. FISH studies are pending and will be reported. Prior Treatment: None    Current Treatment: Bendamustine-Rituximab x 6 cycles, started 1/2022    History of Present Illness:   Nalini Kahn is a 47 y.o. female who comes in for evaluation of Follicular lymphoma. She states she has had vague symptoms for several years as follows. Pt reports h/o pruritus which started 6 yrs ago, which finally subsided with Zyrtec.  She saw a hematologist at that time, who told her symptoms might surface as a blood disorder in the future. In 2016, pt went to see her Gyn (Dr. Karma Pineda) for abdominal cramping. Workup normal at time time. In summer of 2019, pt developed fatigue as well as same of the chronic pain in abdominal area. In fall of 2019, she developed memory issues with work. She went to see her Psychiatrist due to these symptoms and switched her from generic Wellbutrin to brand name Wellbutrin. No improvement after 3-4 weeks. She was okay with sleeping during that time, but continued to have severe fatigue out of character for her. She went to see her PCP and described a right inguinal LN enlargement. She went to see a surgeon, Dr. Dianna Rojas. Ultrasound of that region did demonstrate an enlarged LN. FNA was inconclusive. Core needle biopsy was suspicious for B cell lymphoma. Excisional LN biopsy 6/16/98 revealed follicular lymphoma. CT of chest/abd/pelvis was notable for retroperitoneal, pelvic and portacaval LAD. Pt also reports left hip pain, left foot tingling. No fevers, chills, weight loss. She does have occasional sweats more notable a few months ago, but not drenching or nightly. Patient underwent staging bone marrow biopsy and PET scan. Previously saw [facility] for a 2nd opinion. She was told her GI symptoms are not related to lymphoma. Interval History:  Patient comes in for C3 of BR. Reports intermittent stabbing pain in left lung. Described as tissue paper rubbing together. Has history of pleurisy. Reports pain is slowly improving with extra strength tylenol. Slight pain in left lung with deep breathing. Dyspnea with moderate exertion. Reports intermittent wheezing. Describes the taste of blood in throat but never noticed blood. Reports back and abdominal pain is improving. No fevers, chills.          PMHx: Chronic pain, Depression, Fibrocystic breast  PSurgHx: None aside from bilat breast biopsy and wisdom teeth extraction  SHx: Quit smoking 1/2000. Drinks 5 glasses of wine per week. FHx: Mother had breast cancer age 61. Father had CVA. Review of Systems: A complete review of systems was obtained, negative except as described above. Physical Exam:     Visit Vitals  /60   Pulse 83   Temp 97.7 °F (36.5 °C)   Ht 5' 7.5\" (1.715 m)   Wt 154 lb 4.8 oz (70 kg)   SpO2 96%   BMI 23.81 kg/m²     ECOG PS: 0  General: no distress  Eyes: anicteric sclerae  HENT: oropharynx clear  Neck: supple  Lymphatic: no cervical, axillary adenopathy; prior R inguinal LN and L supraclavicular LN no longer evident  Respiratory: normal respiratory effort  CV: no peripheral edema  GI: soft, nontender, nondistended, no masses  Skin: no rashes; no ecchymoses; no petechiae      Results:     Lab Results   Component Value Date/Time    WBC 3.6 03/07/2022 07:58 AM    HGB 13.2 03/07/2022 07:58 AM    HCT 38.3 03/07/2022 07:58 AM    PLATELET 433 56/35/4814 07:58 AM    MCV 94.1 03/07/2022 07:58 AM    ABS. NEUTROPHILS PENDING 03/07/2022 07:58 AM     Lab Results   Component Value Date/Time    Sodium 137 03/07/2022 07:58 AM    Potassium 4.2 03/07/2022 07:58 AM    Chloride 106 03/07/2022 07:58 AM    CO2 28 03/07/2022 07:58 AM    Glucose 86 03/07/2022 07:58 AM    BUN 14 03/07/2022 07:58 AM    Creatinine 0.83 03/07/2022 07:58 AM    GFR est AA >60 03/07/2022 07:58 AM    GFR est non-AA >60 03/07/2022 07:58 AM    Calcium 9.2 03/07/2022 07:58 AM    Glucose (POC) 88 12/10/2021 07:07 AM     Lab Results   Component Value Date/Time    Bilirubin, total 0.6 03/07/2022 07:58 AM    ALT (SGPT) 25 03/07/2022 07:58 AM    Alk.  phosphatase 99 03/07/2022 07:58 AM    Protein, total 6.7 03/07/2022 07:58 AM    Albumin 3.9 03/07/2022 07:58 AM    Globulin 2.8 03/07/2022 07:58 AM     No results found for: IRON, FE, TIBC, IBCT, PSAT, FERR    No results found for: B12LT, FOL, RBCF  Lab Results   Component Value Date/Time    TSH 1.830 09/25/2019 12:45 PM     Lab Results   Component Value Date/Time    Hepatitis B surface Ag 0.40 01/06/2022 09:13 AM    Hepatitis B surface Ab <3.10 01/06/2022 09:13 AM    Hep B Core Ab, total Negative 01/06/2022 09:13 AM     12/2/21 LD: 170    Imaging:     Chest CT  1/24/20:  FINDINGS:  The normal-sized axillary lymph nodes are unchanged. THYROID: No nodule. MEDIASTINUM: No mass or lymphadenopathy. MARCE: No mass or lymphadenopathy. THORACIC AORTA: No aneurysm. MAIN PULMONARY ARTERY: Normal in caliber. TRACHEA/BRONCHI: Patent. ESOPHAGUS: No wall thickening or dilatation. HEART: Normal in size. PLEURA: No effusion or pneumothorax. LUNGS: Laterally in the left lower lobe on image 53 is a 2 to 3 mm pulmonary  nodule which is unchanged when compared with the examination of 6/27/2017. This  is a benign nodule and not significant. .  There are normal size lymph nodes adjacent to the descending thoracic aorta and  paraspinal region which have increased in size but remain normal in size. .  Abdomen and pelvis: There has been interval development of adenopathy. In the portacaval space there  is a lymph node that measures 17 mm in short axis. There are retroperitoneal  lymph nodes that are enlarged as well. The largest lymph node in the  retroperitoneum is posterior to the inferior vena cava beginning at the level of  the right renal artery this extends inferiorly. This measures 2.4 x 1.0 x 4.0  cm. There are also enlarged lymph nodes in the left aortic region largest  measuring 12 mm in short axis. There are also lymph nodes posterior to the head of the pancreas and anterior to  the inferior vena cava which are enlarged the largest measures 10 mm in short  axis  There are enlarged lymph nodes in the interaortocaval space. Enlarged lymph  nodes along the right common iliac artery largest measuring approximately 12 mm  in short axis. There are enlarged lymph nodes in the right external iliac chain  largest measuring 9 mm.   There is an enlarged lymph node along the right pelvic sidewall measuring 2.5 x  1.3 cm. There are postsurgical changes in the right inguinal region with one  tiny locule of air. There are residual lymph nodes in the right inguinal largest  measuring 10 mm. .  The uterus images normally. There is a 1 cm cyst in the right ovary. There is no free fluid. Review of the bone windows reveals no destructive lesions. The liver and gallbladder are unremarkable. The spleen is normal in size and  configuration. The pancreas and adrenal glands and kidneys are unremarkable. The aorta and inferior vena cava are unremarkable. IMPRESSION:  1. There is is adenopathy as described. There is adenopathy in the right  inguinal region, along the right pelvic sidewall, along the right iliac chain,  bilateral retroperitoneum, and in the portacaval space as well as posterior to  the pancreas head. 2. In the posterior mediastinum adjacent to the descending thoracic aorta are  lymph nodes that measure less than 1 cm in size but these have increased in the  interval.  3. Please see above text    PET 2/10/20:   FINDINGS:  HEAD/NECK: No apparent foci of abnormal hypermetabolism. Cerebral evaluation is  limited by normal intense activity. CHEST: Mildly hypermetabolic left supraclavicular lymph node, maximum SUV 4.2. ABDOMEN/PELVIS: Mildly hypermetabolic portacaval, aortocaval, and left  para-aortic lymph nodes are noted, maximum SUV 5.5 of an aortocaval lymph node. Hypermetabolic right common iliac, right external iliac, right obturator, and  right inguinal lymph nodes.   SKELETON: No foci of abnormal hypermetabolism in the axial and visualized  appendicular skeleton. IMPRESSION: Mildly hypermetabolic left supraclavicular, abdominal,  retroperitoneal, and pelvic lymph nodes. Ch/abd/pelvis CT 5/7/20:  Lymph nodes in chest: There is no new mediastinal, hilar or axillary lymphadenopathy. Subcentimeter bilateral axillary lymph nodes are unchanged.  Subcentimeter  posterior mediastinal lymph node abutting the descending thoracic aorta is  unchanged compared to prior CT dated January 2020. 1.3 cm x 1.1 cm left  supraclavicular lymph node is unchanged compared to prior CT dated January 2020. Lymph nodes in abdom/pelvis: There is a prominent portacaval lymph node measuring approximately  1.6 cm x 2.3 cm, unchanged compared to prior CT dated January 2020. There are multiple prominent and mildly enlarged retroperitoneal lymph nodes, unchanged  compared to prior CT dated January 2020. For example, there is a left  para-aortic retroperitoneal lymph node measures 1.2 cm x 1.6 cm, unchanged  compared to prior CT dated January 2020. As an additional example, there is a 9  mm x 1.3 cm aortocaval lymph node, unchanged compared to prior CT dated January 2020. Right iliac lymph nodes are unchanged compared to prior CT dated January 2020. For example, the largest right iliac lymph node measures 1.1 cm x 1.4 cm,  unchanged compared to prior CT dated January 2020. Right internal obturator  lymph node is unchanged compared to prior CT dated January 2020) measuring  approximately 2.8 cm x 1.4 cm. Prominent right inguinal lymph nodes are not  significantly changed in size. For example, there is a 1 cm x 1.4 cm right  inguinal lymph node, unchanged compared to prior CT dated January 2020. IMPRESSION: No interval change. Discussed with radiology - the right RP LN near R renal artery is unchanged. The 4cm dimension is craniocaudal.    11/9/20 CT c/a/p:  FINDINGS:   LYMPH NODES: There is a prominent portacaval lymph node measuring approximately  1.8 x 2.0 cm, not significantly changed from prior measurements of 1.6 cm x 2.3  cm. There are  multiple prominent and mildly enlarged retroperitoneal lymph nodes, not  significantly changed from prior.  For example, there is a left para-aortic  retroperitoneal lymph node that measures 1.1 x 1.7 cm, not significantly changed  from prior measurements of 1.2 x 1.6 and a 10 x 14 mm aortocaval lymph node, not  significantly changed from prior measurements of 9 x 13 mm. Right iliac lymph  nodes are little changed, though the largest has slightly increased in size  measuring 1.3 x 1.6 cm, previously 1.1 x 1.4 cm. Right internal obturator is not  significantly changed measuring 1.1 x 2.7 cm, previously 1.3 x 3.0 cm. Prominent  right inguinal lymph nodes are not significantly changed in size measuring 1.0 x  1.4 cm. IMPRESSION: No significant interval change apart from a single right common  iliac pelvic lymph nodes which shows slight increase in size by measurement from  1.1 x 1.4 cm to 1.3 x 1.6 cm.    11/18/21 CT ch/abd/pelvis:  LYMPH NODES: Extensive increased retroperitoneal and portacaval adenopathy. 3-67 aortocaval node 25 x 36 mm previously 18 x 9 mm.  3-71 left retroperitoneal node 31 x 26 mm on the previous exam 17 x 11 mm. Right lower quadrant mesenteric adenopathy 3-78 25 x 30 mm previously 14 x 10  mm. VASCULATURE: No aneurysm or dissection. REPRODUCTIVE ORGANS: Uterus and ovaries are unremarkable to  URINARY BLADDER: No mass or calculus. BONES: No destructive bone lesion. ABDOMINAL WALL: No mass or hernia. ADDITIONAL COMMENTS: N/A  IMPRESSION  Imaging findings are consistent with interval progression of disease. Extensive increased mesenteric and retroperitoneal lymphadenopathy with index  lesion measurements as described above. 12/10/21 PET:  FINDINGS:  HEAD/NECK: No apparent foci of abnormal hypermetabolism. Cerebral evaluation is  limited by normal intense activity. CHEST: Left supraclavicular adenopathy SUV 7.5, previous 4.2. New left internal  mammary SUV 3.3. Right diaphragmatic node with SUV 5.  ABDOMEN/PELVIS: Extensive new hypermetabolic and enlarged nodes: upper abdomen  (SUV 9), mesentery, retrocrural, retroperitoneal, right pelvic (SUV 6) and right  groin (SUV 4.5). A previously measured left aortic node is SUV 6.4, previous  5.5.  No splenic activity. SKELETON: No foci of abnormal hypermetabolism in the axial and visualized  appendicular skeleton. IMPRESSION  New/progressed multi level bill disease (Deauville 5).    Each FDG-avid (or previously FDG avid) lesion is rated independently. Reference values:  Mediastinal blood pool: 1.9 SUV  Liver (background): 2.2 SUV    2/17/22 CXR  FINDINGS: The cardiac silhouette is normal in size. There is hyperaeration of  the lungs. There is no evidence of active lung disease. There is evidence of  mild, mid thoracic scoliosis convex to the right. IMPRESSION  Evidence of pulmonary hyperaeration. No evidence of active lung disease. Assessment & Plan:   Lori Novak is a 47 y.o. female with Depression comes in for evaluation and management of Follicular lymphoma. 1. Follicular lymphoma / Fatigue:   Stage III (based on L supraclav and abd/pelvis LAD). Normal LDH and no cytopenias or B symptoms present. Discussed that this is an indolent lymphoma which does not necessarily recommend treatment at time of diagnosis. Patients are observed with H&P and labs every 3-6 months. Treatment is for those who develop B symptoms, cytopenias or bulky disease (LN >7cm). At this time, I am unclear whether patient's symptoms are related to the disease. Furthermore, there are no \"bulky\" LNs > 7cm, but there is a 4cm LN in the right retroperitoneal LN region which if it increased significantly in size could risk of compression of renal vasculature. Bone marrow biopsy shows < 1% monoclonal CD10 positive B cell population (seen on flow cytometry), which is considered negative for BM involvement. PET 2/2020 showed only mildly hypermetabolic uptake with max SUV of 5.5. Previously discussed that treatment options for Stage III, grade 1/2 FL include observation vs chemoimmunotherapy with BR regimen vs single agent Rituxan.  Now with CT 11/2021 and 12/2021 PET imaging that show interval progression of mesenteric and retroperitoneal LAD. Pt also with worsening fatigue. At this time, I recommend treatment with BR regimen given local GI symptoms related to mesenteric LAD and fatigue. We discussed the risks and benefits of R-Bendamustine chemotherapy. The potential side effects include, but are not limited to: nausea, vomiting, diarrhea, constipation, flu-like symptoms, infusion reaction, allergic reaction, flushing, taste changes, increased risk of infection, anemia, fatigue, alopecia, mucositis, myelosuppression, infertility and rarely, death. The patient has consented to begin therapy. Supportive medications include: ondansetron, prochlorperazine. -- Proceed with C3 of BR regimen today  -- Repeat PET 3 weeks after C3   -- Return in 4 weeks for C4, MD/NP visit. 2. Chest and neck pressure / Back pain / SOB:   Symptoms are improving, and left chest discomfort is likely pleurisy. Recent CXR negative for fluid, but showed pulmonary hyperaeration which is sometimes associated with asthma or COPD. EKG normal. Back pain likely due to thoracic scoliosis. The SOB with lying flat is related to discomfort from scoliosis and uncomfortable pressure on certain points in her spine rather than lung pathology. Advised recreating PT environment by using padding (yoga mat or towel) behind neck or upper and mid back when completing the PT exercises at home. -- PFTs to determine cause of hyperaeration, scheduled 3/10/22. -- Continue PT for back pain; Use towel or roll under back. Checking urinalysis. -- Advised prn ibuprofen for left lung pain. 3. Depression / ADHD / Anxiety: On Buproprion, Fluoxetine, Vyvanse. 4. Abdominal pain: Improved after cholecystectomy in Dec 2020. Likely unrelated to the lymphoma. We previously discussed that shon-menopausal state can cause GI upset as can factors such as diet, stress. Reviewed healthy options.     5. Diarrhea alternating with constipation: May be related to lymphoma vs irritable bowel syndrome. Advised daily miralax to prevent constipation. 6. Bruising:  Likely due to SSRI. No significant bleeding episodes. 7. Vaccine counseling:  Had Covid booster and flu shot 12/14/21.     8. Fatigue:   Likely related to #1 and treatment. Encouraged light aerobic exercise. Emotional well being: Pt is coping well with his/her disease and has excellent support. I appreciate the opportunity to participate in Ms. Figueroa [facility] care. I personally saw and evaluated the patient and performed the key components of medical decision making. The history, physical exam, and documentation were performed by Misty Cochran NP. I reviewed and verified the above documentation and modified it as needed. Most of the prior symptoms have resolved and pt is tolerating treatment well so far without any significant cytopenias. Will obtain PET prior to next visit.     Signed By: Get Peterson MD     March 7, 2022

## 2022-03-03 ENCOUNTER — APPOINTMENT (OUTPATIENT)
Dept: INFUSION THERAPY | Age: 52
End: 2022-03-03

## 2022-03-03 DIAGNOSIS — R09.89 PULMONARY HYPERINFLATION: Primary | ICD-10-CM

## 2022-03-04 ENCOUNTER — APPOINTMENT (OUTPATIENT)
Dept: INFUSION THERAPY | Age: 52
End: 2022-03-04

## 2022-03-07 ENCOUNTER — OFFICE VISIT (OUTPATIENT)
Dept: ONCOLOGY | Age: 52
End: 2022-03-07
Payer: COMMERCIAL

## 2022-03-07 ENCOUNTER — HOSPITAL ENCOUNTER (OUTPATIENT)
Dept: INFUSION THERAPY | Age: 52
Discharge: HOME | End: 2022-03-07
Payer: COMMERCIAL

## 2022-03-07 VITALS
BODY MASS INDEX: 23.39 KG/M2 | WEIGHT: 154.3 LBS | SYSTOLIC BLOOD PRESSURE: 109 MMHG | TEMPERATURE: 97.5 F | HEIGHT: 68 IN | HEART RATE: 78 BPM | DIASTOLIC BLOOD PRESSURE: 63 MMHG | OXYGEN SATURATION: 98 % | RESPIRATION RATE: 18 BRPM

## 2022-03-07 VITALS
HEIGHT: 68 IN | SYSTOLIC BLOOD PRESSURE: 105 MMHG | TEMPERATURE: 97.7 F | DIASTOLIC BLOOD PRESSURE: 60 MMHG | OXYGEN SATURATION: 96 % | BODY MASS INDEX: 23.39 KG/M2 | WEIGHT: 154.3 LBS | HEART RATE: 83 BPM

## 2022-03-07 DIAGNOSIS — Z51.11 CHEMOTHERAPY MANAGEMENT, ENCOUNTER FOR: ICD-10-CM

## 2022-03-07 DIAGNOSIS — R06.02 SOB (SHORTNESS OF BREATH): ICD-10-CM

## 2022-03-07 DIAGNOSIS — C82.13 FOLLICULAR LYMPHOMA GRADE II OF INTRA-ABDOMINAL LYMPH NODES (HCC): Primary | ICD-10-CM

## 2022-03-07 DIAGNOSIS — R07.9 LEFT-SIDED CHEST PAIN: ICD-10-CM

## 2022-03-07 DIAGNOSIS — R53.82 CHRONIC FATIGUE: ICD-10-CM

## 2022-03-07 DIAGNOSIS — R09.89 PULMONARY HYPERINFLATION: ICD-10-CM

## 2022-03-07 LAB
ALBUMIN SERPL-MCNC: 3.9 G/DL (ref 3.5–5)
ALBUMIN/GLOB SERPL: 1.4 {RATIO} (ref 1.1–2.2)
ALP SERPL-CCNC: 99 U/L (ref 45–117)
ALT SERPL-CCNC: 25 U/L (ref 12–78)
ANION GAP SERPL CALC-SCNC: 3 MMOL/L (ref 5–15)
AST SERPL-CCNC: 16 U/L (ref 15–37)
BASOPHILS # BLD: 0.1 K/UL (ref 0–0.1)
BASOPHILS NFR BLD: 2 % (ref 0–1)
BILIRUB SERPL-MCNC: 0.6 MG/DL (ref 0.2–1)
BUN SERPL-MCNC: 14 MG/DL (ref 6–20)
BUN/CREAT SERPL: 17 (ref 12–20)
CALCIUM SERPL-MCNC: 9.2 MG/DL (ref 8.5–10.1)
CHLORIDE SERPL-SCNC: 106 MMOL/L (ref 97–108)
CO2 SERPL-SCNC: 28 MMOL/L (ref 21–32)
CREAT SERPL-MCNC: 0.83 MG/DL (ref 0.55–1.02)
DIFFERENTIAL METHOD BLD: ABNORMAL
EOSINOPHIL # BLD: 0.2 K/UL (ref 0–0.4)
EOSINOPHIL NFR BLD: 6 % (ref 0–7)
ERYTHROCYTE [DISTWIDTH] IN BLOOD BY AUTOMATED COUNT: 12.5 % (ref 11.5–14.5)
GLOBULIN SER CALC-MCNC: 2.8 G/DL (ref 2–4)
GLUCOSE SERPL-MCNC: 86 MG/DL (ref 65–100)
HCT VFR BLD AUTO: 38.3 % (ref 35–47)
HGB BLD-MCNC: 13.2 G/DL (ref 11.5–16)
IMM GRANULOCYTES # BLD AUTO: 0 K/UL (ref 0–0.04)
IMM GRANULOCYTES NFR BLD AUTO: 1 % (ref 0–0.5)
LYMPHOCYTES # BLD: 0.4 K/UL (ref 0.8–3.5)
LYMPHOCYTES NFR BLD: 12 % (ref 12–49)
MCH RBC QN AUTO: 32.4 PG (ref 26–34)
MCHC RBC AUTO-ENTMCNC: 34.5 G/DL (ref 30–36.5)
MCV RBC AUTO: 94.1 FL (ref 80–99)
MONOCYTES # BLD: 0.7 K/UL (ref 0–1)
MONOCYTES NFR BLD: 20 % (ref 5–13)
NEUTS SEG # BLD: 2.2 K/UL (ref 1.8–8)
NEUTS SEG NFR BLD: 59 % (ref 32–75)
NRBC # BLD: 0 K/UL (ref 0–0.01)
NRBC BLD-RTO: 0 PER 100 WBC
PLATELET # BLD AUTO: 282 K/UL (ref 150–400)
PMV BLD AUTO: 8.8 FL (ref 8.9–12.9)
POTASSIUM SERPL-SCNC: 4.2 MMOL/L (ref 3.5–5.1)
PROT SERPL-MCNC: 6.7 G/DL (ref 6.4–8.2)
RBC # BLD AUTO: 4.07 M/UL (ref 3.8–5.2)
RBC MORPH BLD: ABNORMAL
SODIUM SERPL-SCNC: 137 MMOL/L (ref 136–145)
WBC # BLD AUTO: 3.6 K/UL (ref 3.6–11)

## 2022-03-07 PROCEDURE — 80053 COMPREHEN METABOLIC PANEL: CPT

## 2022-03-07 PROCEDURE — 36415 COLL VENOUS BLD VENIPUNCTURE: CPT

## 2022-03-07 PROCEDURE — 85025 COMPLETE CBC W/AUTO DIFF WBC: CPT

## 2022-03-07 PROCEDURE — 74011250637 HC RX REV CODE- 250/637: Performed by: NURSE PRACTITIONER

## 2022-03-07 PROCEDURE — 74011250636 HC RX REV CODE- 250/636: Performed by: NURSE PRACTITIONER

## 2022-03-07 PROCEDURE — 74011000258 HC RX REV CODE- 258: Performed by: NURSE PRACTITIONER

## 2022-03-07 PROCEDURE — 96375 TX/PRO/DX INJ NEW DRUG ADDON: CPT

## 2022-03-07 PROCEDURE — 96413 CHEMO IV INFUSION 1 HR: CPT

## 2022-03-07 PROCEDURE — 96411 CHEMO IV PUSH ADDL DRUG: CPT

## 2022-03-07 PROCEDURE — 99215 OFFICE O/P EST HI 40 MIN: CPT | Performed by: INTERNAL MEDICINE

## 2022-03-07 RX ORDER — ACETAMINOPHEN 325 MG/1
650 TABLET ORAL ONCE
Status: COMPLETED | OUTPATIENT
Start: 2022-03-07 | End: 2022-03-07

## 2022-03-07 RX ORDER — PALONOSETRON 0.05 MG/ML
0.25 INJECTION, SOLUTION INTRAVENOUS ONCE
Status: COMPLETED | OUTPATIENT
Start: 2022-03-07 | End: 2022-03-07

## 2022-03-07 RX ORDER — SODIUM CHLORIDE 9 MG/ML
25 INJECTION, SOLUTION INTRAVENOUS CONTINUOUS
Status: DISPENSED | OUTPATIENT
Start: 2022-03-07 | End: 2022-03-07

## 2022-03-07 RX ORDER — DIPHENHYDRAMINE HYDROCHLORIDE 50 MG/ML
50 INJECTION, SOLUTION INTRAMUSCULAR; INTRAVENOUS ONCE
Status: COMPLETED | OUTPATIENT
Start: 2022-03-07 | End: 2022-03-07

## 2022-03-07 RX ORDER — DEXAMETHASONE SODIUM PHOSPHATE 100 MG/10ML
10 INJECTION INTRAMUSCULAR; INTRAVENOUS ONCE
Status: COMPLETED | OUTPATIENT
Start: 2022-03-07 | End: 2022-03-07

## 2022-03-07 RX ADMIN — SODIUM CHLORIDE 683 MG: 9 INJECTION, SOLUTION INTRAVENOUS at 11:08

## 2022-03-07 RX ADMIN — PALONOSETRON 0.25 MG: 0.05 INJECTION, SOLUTION INTRAVENOUS at 12:50

## 2022-03-07 RX ADMIN — ACETAMINOPHEN 650 MG: 325 TABLET ORAL at 10:27

## 2022-03-07 RX ADMIN — DEXAMETHASONE SODIUM PHOSPHATE 10 MG: 10 INJECTION INTRAMUSCULAR; INTRAVENOUS at 12:51

## 2022-03-07 RX ADMIN — DIPHENHYDRAMINE HYDROCHLORIDE 50 MG: 50 INJECTION, SOLUTION INTRAMUSCULAR; INTRAVENOUS at 10:29

## 2022-03-07 RX ADMIN — SODIUM CHLORIDE 25 ML/HR: 9 INJECTION, SOLUTION INTRAVENOUS at 10:27

## 2022-03-07 RX ADMIN — BENDAMUSTINE HYDROCHLORIDE 165 MG: 25 INJECTION, SOLUTION INTRAVENOUS at 13:29

## 2022-03-07 NOTE — PROGRESS NOTES
Hunter Schumacher is a 47 y.o. female here for follow up of follicular lymphoma. Patient with complaints of pain in left lung, rates as a 2 out of 10.

## 2022-03-07 NOTE — PROGRESS NOTES
[facility] Progress Note    Date: 2022    Name: Jono Whittaker    MRN: [id number]         : 1970    Ms. Man Arrived ambulatory and in no distress for C3D1 of Ruxience + Bendamustine Regimen. Assessment completed DEX Mejia RN. Pt reporting PIV to L AC is hurting - removed & 24 G PIV established to R forearm, + blood return. Chemotherapy Flowsheet 3/7/2022   Cycle C3D1   Date 3/7/2022   Drug / Regimen Ruxience + Bendamustine   Pre Meds -   Notes -       0800. Patient proceed to appointment with Dr. Emmett Eubanks. Ms. De Sung vitals were reviewed. Patient Vitals for the past 12 hrs:   Temp Pulse Resp BP SpO2   22 1340 -- 78 18 109/63 98 %   22 1138 97.5 °F (36.4 °C) 69 18 120/67 --   22 0747 97.7 °F (36.5 °C) 83 16 105/60 96 %         Lab results were obtained and reviewed. Recent Results (from the past 12 hour(s))   CBC WITH AUTOMATED DIFF    Collection Time: 22  7:58 AM   Result Value Ref Range    WBC 3.6 3.6 - 11.0 K/uL    RBC 4.07 3.80 - 5.20 M/uL    HGB 13.2 11.5 - 16.0 g/dL    HCT 38.3 35.0 - 47.0 %    MCV 94.1 80.0 - 99.0 FL    MCH 32.4 26.0 - 34.0 PG    MCHC 34.5 30.0 - 36.5 g/dL    RDW 12.5 11.5 - 14.5 %    PLATELET 543 257 - 698 K/uL    MPV 8.8 (L) 8.9 - 12.9 FL    NRBC 0.0 0  WBC    ABSOLUTE NRBC 0.00 0.00 - 0.01 K/uL    NEUTROPHILS 59 32 - 75 %    LYMPHOCYTES 12 12 - 49 %    MONOCYTES 20 (H) 5 - 13 %    EOSINOPHILS 6 0 - 7 %    BASOPHILS 2 (H) 0 - 1 %    IMMATURE GRANULOCYTES 1 (H) 0.0 - 0.5 %    ABS. NEUTROPHILS 2.2 1.8 - 8.0 K/UL    ABS. LYMPHOCYTES 0.4 (L) 0.8 - 3.5 K/UL    ABS. MONOCYTES 0.7 0.0 - 1.0 K/UL    ABS. EOSINOPHILS 0.2 0.0 - 0.4 K/UL    ABS. BASOPHILS 0.1 0.0 - 0.1 K/UL    ABS. IMM.  GRANS. 0.0 0.00 - 0.04 K/UL    DF SMEAR SCANNED      RBC COMMENTS NORMOCYTIC, NORMOCHROMIC     METABOLIC PANEL, COMPREHENSIVE    Collection Time: 22  7:58 AM   Result Value Ref Range    Sodium 137 136 - 145 mmol/L    Potassium 4.2 3.5 - 5.1 mmol/L Chloride 106 97 - 108 mmol/L    CO2 28 21 - 32 mmol/L    Anion gap 3 (L) 5 - 15 mmol/L    Glucose 86 65 - 100 mg/dL    BUN 14 6 - 20 MG/DL    Creatinine 0.83 0.55 - 1.02 MG/DL    BUN/Creatinine ratio 17 12 - 20      GFR est AA >60 >60 ml/min/1.73m2    GFR est non-AA >60 >60 ml/min/1.73m2    Calcium 9.2 8.5 - 10.1 MG/DL    Bilirubin, total 0.6 0.2 - 1.0 MG/DL    ALT (SGPT) 25 12 - 78 U/L    AST (SGOT) 16 15 - 37 U/L    Alk.  phosphatase 99 45 - 117 U/L    Protein, total 6.7 6.4 - 8.2 g/dL    Albumin 3.9 3.5 - 5.0 g/dL    Globulin 2.8 2.0 - 4.0 g/dL    A-G Ratio 1.4 1.1 - 2.2         Medications:    Medications Administered     0.9% sodium chloride infusion     Admin Date  03/07/2022 Action  New Bag Dose  25 mL/hr Rate  25 mL/hr Route  IntraVENous Administered By  Clint Fernandez RN          acetaminophen (TYLENOL) tablet 650 mg     Admin Date  03/07/2022 Action  Given Dose  650 mg Route  Oral Administered By  Clint Fernandez RN          bendamustine 165 mg in 0.9% sodium chloride 50 mL, overfill volume 5 mL chemo infusion     Admin Date  03/07/2022 Action  New Bag Dose  165 mg Rate  369.6 mL/hr Route  IntraVENous Administered By  Clint Fernandez RN          dexamethasone (DECADRON) 10 mg/mL injection 10 mg     Admin Date  03/07/2022 Action  Given Dose  10 mg Route  IntraVENous Administered By  Clint Fernandez RN          diphenhydrAMINE (BENADRYL) injection 50 mg     Admin Date  03/07/2022 Action  Given Dose  50 mg Route  IntraVENous Administered By  Clint Fernandez RN          palonosetron HCl (ALOXI) injection 0.25 mg     Admin Date  03/07/2022 Action  Given Dose  0.25 mg Route  IntraVENous Administered By  Clint Fernandez RN          riTUXimab-pvvr (RUXIENCE) 683 mg in 0.9% sodium chloride 250 mL RAPID infusion     Admin Date  03/07/2022 Action  New Bag Dose  683 mg Rate  100 mL/hr Route  IntraVENous Administered By  Clint Fernandez RN           Admin Date  03/07/2022 Action  Rate Change Dose   Rate  200 mL/hr Route  IntraVENous Administered By  [place], RN                    Ms. Ela Crawford tolerated treatment well and was discharged from [place] [address] in stable condition. PIV flushed & removed. She is to return on 3/8/22 for her next appointment.     Santos Chase RN  March 7, 2022

## 2022-03-08 ENCOUNTER — HOSPITAL ENCOUNTER (OUTPATIENT)
Dept: INFUSION THERAPY | Age: 52
Discharge: HOME | End: 2022-03-08
Payer: COMMERCIAL

## 2022-03-08 VITALS
HEART RATE: 75 BPM | BODY MASS INDEX: 24.33 KG/M2 | TEMPERATURE: 97.6 F | OXYGEN SATURATION: 99 % | DIASTOLIC BLOOD PRESSURE: 73 MMHG | SYSTOLIC BLOOD PRESSURE: 126 MMHG | WEIGHT: 155 LBS | HEIGHT: 67 IN | RESPIRATION RATE: 18 BRPM

## 2022-03-08 DIAGNOSIS — C82.13 FOLLICULAR LYMPHOMA GRADE II OF INTRA-ABDOMINAL LYMPH NODES (HCC): ICD-10-CM

## 2022-03-08 DIAGNOSIS — R06.02 SOB (SHORTNESS OF BREATH): Primary | ICD-10-CM

## 2022-03-08 PROCEDURE — 74011000258 HC RX REV CODE- 258: Performed by: NURSE PRACTITIONER

## 2022-03-08 PROCEDURE — 74011250636 HC RX REV CODE- 250/636: Performed by: NURSE PRACTITIONER

## 2022-03-08 PROCEDURE — 96375 TX/PRO/DX INJ NEW DRUG ADDON: CPT

## 2022-03-08 PROCEDURE — 96409 CHEMO IV PUSH SNGL DRUG: CPT

## 2022-03-08 PROCEDURE — 96374 THER/PROPH/DIAG INJ IV PUSH: CPT

## 2022-03-08 RX ORDER — SODIUM CHLORIDE 9 MG/ML
25 INJECTION, SOLUTION INTRAVENOUS CONTINUOUS
Status: DISPENSED | OUTPATIENT
Start: 2022-03-08 | End: 2022-03-08

## 2022-03-08 RX ORDER — HEPARIN 100 UNIT/ML
300-500 SYRINGE INTRAVENOUS AS NEEDED
Status: ACTIVE | OUTPATIENT
Start: 2022-03-08 | End: 2022-03-08

## 2022-03-08 RX ORDER — SODIUM CHLORIDE 0.9 % (FLUSH) 0.9 %
10 SYRINGE (ML) INJECTION AS NEEDED
Status: DISPENSED | OUTPATIENT
Start: 2022-03-08 | End: 2022-03-08

## 2022-03-08 RX ORDER — DEXAMETHASONE SODIUM PHOSPHATE 100 MG/10ML
10 INJECTION INTRAMUSCULAR; INTRAVENOUS ONCE
Status: COMPLETED | OUTPATIENT
Start: 2022-03-08 | End: 2022-03-08

## 2022-03-08 RX ORDER — SODIUM CHLORIDE 9 MG/ML
10 INJECTION INTRAMUSCULAR; INTRAVENOUS; SUBCUTANEOUS AS NEEDED
Status: ACTIVE | OUTPATIENT
Start: 2022-03-08 | End: 2022-03-08

## 2022-03-08 RX ADMIN — DEXAMETHASONE SODIUM PHOSPHATE 10 MG: 10 INJECTION INTRAMUSCULAR; INTRAVENOUS at 12:35

## 2022-03-08 RX ADMIN — SODIUM CHLORIDE 25 ML/HR: 9 INJECTION, SOLUTION INTRAVENOUS at 12:35

## 2022-03-08 RX ADMIN — BENDAMUSTINE HYDROCHLORIDE 165 MG: 25 INJECTION, SOLUTION INTRAVENOUS at 13:12

## 2022-03-10 ENCOUNTER — HOSPITAL ENCOUNTER (OUTPATIENT)
Dept: PULMONOLOGY | Age: 52
Discharge: HOME | End: 2022-03-10
Attending: NURSE PRACTITIONER
Payer: COMMERCIAL

## 2022-03-10 VITALS — OXYGEN SATURATION: 95 %

## 2022-03-10 DIAGNOSIS — R09.89 PULMONARY HYPERINFLATION: ICD-10-CM

## 2022-03-10 PROCEDURE — 94726 PLETHYSMOGRAPHY LUNG VOLUMES: CPT

## 2022-03-10 PROCEDURE — 94760 N-INVAS EAR/PLS OXIMETRY 1: CPT

## 2022-03-10 PROCEDURE — 94375 RESPIRATORY FLOW VOLUME LOOP: CPT

## 2022-03-10 PROCEDURE — 94729 DIFFUSING CAPACITY: CPT

## 2022-03-11 DIAGNOSIS — R09.89 HYPERINFLATION OF LUNGS: Primary | ICD-10-CM

## 2022-03-11 NOTE — PROGRESS NOTES
Please let patient know her pulmonary function test showed mild hyperinflation of her lungs. Advise I am placing a referral to pulmonology for further evaluation.

## 2022-03-11 NOTE — PROCEDURES
[facility] [facility]  PULMONARY FUNCTION TEST    Name:  Marco Johnson  MR#:  [id number]  :  1970  ACCOUNT #:  [de-identified]  DATE OF SERVICE:  03/10/2022      Spirometry reveals a normal FEV1-to-FVC ratio of 81% predicted and this is not consistent with an obstructive defect. FVC is within normal limits at 3.78 L and 104% predicted. FEV1 is normal at 3.06 L and 111% predicted. There is air trapping with a residual volume of 130% predicted and mild hyperinflation with a total lung capacity of 115% predicted. DLCO is normal at 93% predicted.       Queenie Bellamy MD [id number]/  D:  03/10/2022 15:56  T:  03/10/2022 20:27  JOB #:  [id number]

## 2022-03-19 PROBLEM — R06.02 SOB (SHORTNESS OF BREATH): Status: ACTIVE | Noted: 2022-02-18

## 2022-03-19 PROBLEM — T78.40XA ACUTE ALLERGIC REACTION: Status: ACTIVE | Noted: 2021-06-24

## 2022-03-19 PROBLEM — C82.13 FOLLICULAR LYMPHOMA GRADE II OF INTRA-ABDOMINAL LYMPH NODES (HCC): Status: ACTIVE | Noted: 2021-12-14

## 2022-03-20 PROBLEM — T78.3XXA ANGIOEDEMA: Status: ACTIVE | Noted: 2021-06-24

## 2022-03-25 NOTE — PROGRESS NOTES
[id number] [facility] at [facility]  [phone]    Hematology / Oncology Established Visit    Reason for Visit:   Jordan Gomez is a 47 y.o. female who is seen for follow up of lymphoma. PCP is Dr. Frederick Alvarenga. Hematology Oncology Treatment History:     Diagnosis: Follicular lymphoma    Stage: III; FLIPI2 low risk    Pathology:   -1/20/20 Right inguinal LN excision: Follicular lymphoma, grade 1-2, follicular pattern, AT84 positive. Comment:   The LN specimen demonstrates increased follicles, lacking normal germinal centers. IHC markers are performed with good controls. Tumor cells are positive for CD20 and BCL2. CD3 marks background T cells. Cyclin D1 is negative in the lymphoid population. CD21 highlights intact dendritic meshworks. Flow cytometry shows positive expression of CD10 and kappa LC restriction. Ki-67 is pending. Focally there are a few follicles with greater than fifteen centroblasts per HPF; however the majority of follicles have less than 15 centroblasts/HPF, consistent with grade 1-2. Diffuse areas are not seen.    -2/5/20 Bone marrow biopsy: Normocellular marrow with multilineage hematopoiesis and <1% involvement by a CD10 positive B-cell lymphoproliferative disorder. Negative for immunophenotypic or morphologic evidence of dysplasia no increase in blasts. Comment   Flow cytometric analysis reveals a small population of CD10 positive clonal B cells compatible with involvement by the patient's recently diagnosed follicular lymphoma. FISH studies are pending and will be reported. Prior Treatment: None    Current Treatment: Bendamustine-Rituximab x 6 cycles, started 1/2022    History of Present Illness:   Jordan Gomez is a 47 y.o. female who comes in for evaluation of Follicular lymphoma. She states she has had vague symptoms for several years as follows. Pt reports h/o pruritus which started 6 yrs ago, which finally subsided with Zyrtec.  She saw a hematologist at that time, who told her symptoms might surface as a blood disorder in the future. In 2016, pt went to see her Gyn (Dr. Viv Reagan) for abdominal cramping. Workup normal at time time. In summer of 2019, pt developed fatigue as well as same of the chronic pain in abdominal area. In fall of 2019, she developed memory issues with work. She went to see her Psychiatrist due to these symptoms and switched her from generic Wellbutrin to brand name Wellbutrin. No improvement after 3-4 weeks. She was okay with sleeping during that time, but continued to have severe fatigue out of character for her. She went to see her PCP and described a right inguinal LN enlargement. She went to see a surgeon, Dr. Teodoro Newsome. Ultrasound of that region did demonstrate an enlarged LN. FNA was inconclusive. Core needle biopsy was suspicious for B cell lymphoma. Excisional LN biopsy 4/08/17 revealed follicular lymphoma. CT of chest/abd/pelvis was notable for retroperitoneal, pelvic and portacaval LAD. Pt also reports left hip pain, left foot tingling. No fevers, chills, weight loss. She does have occasional sweats more notable a few months ago, but not drenching or nightly. Patient underwent staging bone marrow biopsy and PET scan. Previously saw [facility] for a 2nd opinion. She was told her GI symptoms are not related to lymphoma. Interval History:  Patient comes in for C4 of BR. Completed PET. Healing anterior rib fractures seen on PET. Reports history of rib fracture at least 10 years ago. Reports left sided pain has resolved. Reports SOB/dyspnea has resolved. Report intermittent dry cough. Reports sometimes when she drinks liquids she experiences intermittent burning sensation in neck. None currently. Reports intermittent hot flashes - may be due to menopause given no menses x 3 months. No recent fevers, chills, CP or rashes. Ongoing constipation alternating with diarrhea.        PMHx: Chronic pain, Depression, Fibrocystic breast  PSurgHx: None aside from bilat breast biopsy and wisdom teeth extraction  SHx: Quit smoking 1/2000. Drinks 5 glasses of wine per week. FHx: Mother had breast cancer age 61. Father had CVA. Review of Systems: A complete review of systems was obtained, negative except as described above. Physical Exam:     Visit Vitals  BP (!) 123/58   Pulse 89   Temp 98.2 °F (36.8 °C)   Ht 5' 7\" (1.702 m)   Wt 156 lb 9.6 oz (71 kg)   SpO2 98%   BMI 24.53 kg/m²     ECOG PS: 0  General: no distress  Eyes: anicteric sclerae  HENT: oropharynx clear  Neck: supple  Lymphatic: no cervical, axillary adenopathy; prior R inguinal LN and L supraclavicular LN no longer evident  Respiratory: normal respiratory effort  CV: no peripheral edema  GI: soft, nontender, nondistended, no masses  Skin: no rashes; no ecchymoses; no petechiae      Results:     Lab Results   Component Value Date/Time    WBC 3.6 03/07/2022 07:58 AM    HGB 13.2 03/07/2022 07:58 AM    HCT 38.3 03/07/2022 07:58 AM    PLATELET 016 79/42/0192 07:58 AM    MCV 94.1 03/07/2022 07:58 AM    ABS. NEUTROPHILS 2.2 03/07/2022 07:58 AM     Lab Results   Component Value Date/Time    Sodium 137 03/07/2022 07:58 AM    Potassium 4.2 03/07/2022 07:58 AM    Chloride 106 03/07/2022 07:58 AM    CO2 28 03/07/2022 07:58 AM    Glucose 86 03/07/2022 07:58 AM    BUN 14 03/07/2022 07:58 AM    Creatinine 0.83 03/07/2022 07:58 AM    GFR est AA >60 03/07/2022 07:58 AM    GFR est non-AA >60 03/07/2022 07:58 AM    Calcium 9.2 03/07/2022 07:58 AM    Glucose (POC) 88 12/10/2021 07:07 AM     Lab Results   Component Value Date/Time    Bilirubin, total 0.6 03/07/2022 07:58 AM    ALT (SGPT) 25 03/07/2022 07:58 AM    Alk.  phosphatase 99 03/07/2022 07:58 AM    Protein, total 6.7 03/07/2022 07:58 AM    Albumin 3.9 03/07/2022 07:58 AM    Globulin 2.8 03/07/2022 07:58 AM     No results found for: IRON, FE, TIBC, IBCT, PSAT, FERR    No results found for: B12LT, FOL, RBCF  Lab Results Component Value Date/Time    TSH 1.830 09/25/2019 12:45 PM     Lab Results   Component Value Date/Time    Hepatitis B surface Ag 0.40 01/06/2022 09:13 AM    Hepatitis B surface Ab <3.10 01/06/2022 09:13 AM    Hep B Core Ab, total Negative 01/06/2022 09:13 AM     12/2/21 LD: 170    Imaging:     Chest CT  1/24/20:  FINDINGS:  The normal-sized axillary lymph nodes are unchanged. THYROID: No nodule. MEDIASTINUM: No mass or lymphadenopathy. MARCE: No mass or lymphadenopathy. THORACIC AORTA: No aneurysm. MAIN PULMONARY ARTERY: Normal in caliber. TRACHEA/BRONCHI: Patent. ESOPHAGUS: No wall thickening or dilatation. HEART: Normal in size. PLEURA: No effusion or pneumothorax. LUNGS: Laterally in the left lower lobe on image 53 is a 2 to 3 mm pulmonary  nodule which is unchanged when compared with the examination of 6/27/2017. This  is a benign nodule and not significant. .  There are normal size lymph nodes adjacent to the descending thoracic aorta and  paraspinal region which have increased in size but remain normal in size. .  Abdomen and pelvis: There has been interval development of adenopathy. In the portacaval space there  is a lymph node that measures 17 mm in short axis. There are retroperitoneal  lymph nodes that are enlarged as well. The largest lymph node in the  retroperitoneum is posterior to the inferior vena cava beginning at the level of  the right renal artery this extends inferiorly. This measures 2.4 x 1.0 x 4.0  cm. There are also enlarged lymph nodes in the left aortic region largest  measuring 12 mm in short axis. There are also lymph nodes posterior to the head of the pancreas and anterior to  the inferior vena cava which are enlarged the largest measures 10 mm in short  axis  There are enlarged lymph nodes in the interaortocaval space. Enlarged lymph  nodes along the right common iliac artery largest measuring approximately 12 mm  in short axis.  There are enlarged lymph nodes in the right external iliac chain  largest measuring 9 mm. There is an enlarged lymph node along the right pelvic sidewall measuring 2.5 x  1.3 cm. There are postsurgical changes in the right inguinal region with one  tiny locule of air. There are residual lymph nodes in the right inguinal largest  measuring 10 mm. .  The uterus images normally. There is a 1 cm cyst in the right ovary. There is no free fluid. Review of the bone windows reveals no destructive lesions. The liver and gallbladder are unremarkable. The spleen is normal in size and  configuration. The pancreas and adrenal glands and kidneys are unremarkable. The aorta and inferior vena cava are unremarkable. IMPRESSION:  1. There is is adenopathy as described. There is adenopathy in the right  inguinal region, along the right pelvic sidewall, along the right iliac chain,  bilateral retroperitoneum, and in the portacaval space as well as posterior to  the pancreas head. 2. In the posterior mediastinum adjacent to the descending thoracic aorta are  lymph nodes that measure less than 1 cm in size but these have increased in the  interval.  3. Please see above text    PET 2/10/20:   FINDINGS:  HEAD/NECK: No apparent foci of abnormal hypermetabolism. Cerebral evaluation is  limited by normal intense activity. CHEST: Mildly hypermetabolic left supraclavicular lymph node, maximum SUV 4.2. ABDOMEN/PELVIS: Mildly hypermetabolic portacaval, aortocaval, and left  para-aortic lymph nodes are noted, maximum SUV 5.5 of an aortocaval lymph node. Hypermetabolic right common iliac, right external iliac, right obturator, and  right inguinal lymph nodes.   SKELETON: No foci of abnormal hypermetabolism in the axial and visualized  appendicular skeleton. IMPRESSION: Mildly hypermetabolic left supraclavicular, abdominal,  retroperitoneal, and pelvic lymph nodes.     Ch/abd/pelvis CT 5/7/20:  Lymph nodes in chest: There is no new mediastinal, hilar or axillary lymphadenopathy. Subcentimeter bilateral axillary lymph nodes are unchanged. Subcentimeter  posterior mediastinal lymph node abutting the descending thoracic aorta is  unchanged compared to prior CT dated January 2020. 1.3 cm x 1.1 cm left  supraclavicular lymph node is unchanged compared to prior CT dated January 2020. Lymph nodes in abdom/pelvis: There is a prominent portacaval lymph node measuring approximately  1.6 cm x 2.3 cm, unchanged compared to prior CT dated January 2020. There are multiple prominent and mildly enlarged retroperitoneal lymph nodes, unchanged  compared to prior CT dated January 2020. For example, there is a left  para-aortic retroperitoneal lymph node measures 1.2 cm x 1.6 cm, unchanged  compared to prior CT dated January 2020. As an additional example, there is a 9  mm x 1.3 cm aortocaval lymph node, unchanged compared to prior CT dated January 2020. Right iliac lymph nodes are unchanged compared to prior CT dated January 2020. For example, the largest right iliac lymph node measures 1.1 cm x 1.4 cm,  unchanged compared to prior CT dated January 2020. Right internal obturator  lymph node is unchanged compared to prior CT dated January 2020) measuring  approximately 2.8 cm x 1.4 cm. Prominent right inguinal lymph nodes are not  significantly changed in size. For example, there is a 1 cm x 1.4 cm right  inguinal lymph node, unchanged compared to prior CT dated January 2020. IMPRESSION: No interval change. Discussed with radiology - the right RP LN near R renal artery is unchanged. The 4cm dimension is craniocaudal.    11/9/20 CT c/a/p:  FINDINGS:   LYMPH NODES: There is a prominent portacaval lymph node measuring approximately  1.8 x 2.0 cm, not significantly changed from prior measurements of 1.6 cm x 2.3  cm. There are  multiple prominent and mildly enlarged retroperitoneal lymph nodes, not  significantly changed from prior.  For example, there is a left para-aortic  retroperitoneal lymph node that measures 1.1 x 1.7 cm, not significantly changed  from prior measurements of 1.2 x 1.6 and a 10 x 14 mm aortocaval lymph node, not  significantly changed from prior measurements of 9 x 13 mm. Right iliac lymph  nodes are little changed, though the largest has slightly increased in size  measuring 1.3 x 1.6 cm, previously 1.1 x 1.4 cm. Right internal obturator is not  significantly changed measuring 1.1 x 2.7 cm, previously 1.3 x 3.0 cm. Prominent  right inguinal lymph nodes are not significantly changed in size measuring 1.0 x  1.4 cm. IMPRESSION: No significant interval change apart from a single right common  iliac pelvic lymph nodes which shows slight increase in size by measurement from  1.1 x 1.4 cm to 1.3 x 1.6 cm.    11/18/21 CT ch/abd/pelvis:  LYMPH NODES: Extensive increased retroperitoneal and portacaval adenopathy. 3-67 aortocaval node 25 x 36 mm previously 18 x 9 mm.  3-71 left retroperitoneal node 31 x 26 mm on the previous exam 17 x 11 mm. Right lower quadrant mesenteric adenopathy 3-78 25 x 30 mm previously 14 x 10  mm. VASCULATURE: No aneurysm or dissection. REPRODUCTIVE ORGANS: Uterus and ovaries are unremarkable to  URINARY BLADDER: No mass or calculus. BONES: No destructive bone lesion. ABDOMINAL WALL: No mass or hernia. ADDITIONAL COMMENTS: N/A  IMPRESSION  Imaging findings are consistent with interval progression of disease. Extensive increased mesenteric and retroperitoneal lymphadenopathy with index  lesion measurements as described above. 12/10/21 PET:  FINDINGS:  HEAD/NECK: No apparent foci of abnormal hypermetabolism. Cerebral evaluation is  limited by normal intense activity. CHEST: Left supraclavicular adenopathy SUV 7.5, previous 4.2. New left internal  mammary SUV 3.3.  Right diaphragmatic node with SUV 5.  ABDOMEN/PELVIS: Extensive new hypermetabolic and enlarged nodes: upper abdomen  (SUV 9), mesentery, retrocrural, retroperitoneal, right pelvic (SUV 6) and right  groin (SUV 4.5). A previously measured left aortic node is SUV 6.4, previous  5.5. No splenic activity. SKELETON: No foci of abnormal hypermetabolism in the axial and visualized  appendicular skeleton. IMPRESSION  New/progressed multi level bill disease (Deauville 5).    Each FDG-avid (or previously FDG avid) lesion is rated independently. Reference values:  Mediastinal blood pool: 1.9 SUV  Liver (background): 2.2 SUV    2/17/22 CXR  FINDINGS: The cardiac silhouette is normal in size. There is hyperaeration of  the lungs. There is no evidence of active lung disease. There is evidence of  mild, mid thoracic scoliosis convex to the right. IMPRESSION  Evidence of pulmonary hyperaeration. No evidence of active lung disease. 3/29/22 PET:   FINDINGS:  HEAD/NECK: Maxillary activity is again noted and likely related to underlying  dental disease. Cerebral evaluation is limited by normal intense activity. CHEST: No foci of abnormal hypermetabolism. Resolved left supraclavicular, and  LC, mediastinal, and right cardiophrenic angle lymph node activity. ABDOMEN/PELVIS: No foci of abnormal hypermetabolism. Resolved abdominal,  retrocrural, retroperitoneal, pelvic, and inguinal lymph node activity. SKELETON: Mildly hypermetabolic healing left anterior rib fractures are noted. IMPRESSION  Resolution of all previously described areas of lymph node activity  (all previously described lesions now Deauville 1). Increased tracer activity  corresponds to healing left anterior rib fractures. Maxillary activity likely  related to dental disease. Assessment & Plan:   Jarod Newton is a 47 y.o. female with Depression comes in for evaluation and management of Follicular lymphoma. 1. Follicular lymphoma / Fatigue:   Stage III (based on L supraclav and abd/pelvis LAD). Normal LDH and no cytopenias or B symptoms present.  Discussed that this is an indolent lymphoma which does not necessarily recommend treatment at time of diagnosis. Patients are observed with H&P and labs every 3-6 months. Treatment is for those who develop B symptoms, cytopenias or bulky disease (LN >7cm). At this time, I am unclear whether patient's symptoms are related to the disease. Furthermore, there are no \"bulky\" LNs > 7cm, but there is a 4cm LN in the right retroperitoneal LN region which if it increased significantly in size could risk of compression of renal vasculature. Bone marrow biopsy shows < 1% monoclonal CD10 positive B cell population (seen on flow cytometry), which is considered negative for BM involvement. PET 2/2020 showed only mildly hypermetabolic uptake with max SUV of 5.5. Previously discussed that treatment options for Stage III, grade 1/2 FL include observation vs chemoimmunotherapy with BR regimen vs single agent Rituxan. CT 11/2021 and 12/2021 PET imaging showed interval progression of mesenteric and retroperitoneal LAD. Pt also with worsening fatigue. At this time, I recommend treatment with BR regimen given local GI symptoms related to mesenteric LAD and fatigue. We discussed the risks and benefits of R-Bendamustine chemotherapy. The potential side effects include, but are not limited to: nausea, vomiting, diarrhea, constipation, flu-like symptoms, infusion reaction, allergic reaction, flushing, taste changes, increased risk of infection, anemia, fatigue, alopecia, mucositis, myelosuppression, infertility and rarely, death. The patient has consented to begin therapy. Supportive medications include: ondansetron, prochlorperazine. 3/29/22 mid-treatment PET showed resolution of all previously described areas of lymph node activity. -- Proceed with C4 of BR regimen today  -- Repeat PET at completion of treatment. -- Return in 4 weeks for C5, MD/NP visit. 2. Chest and neck pressure / Back pain / SOB:   Symptoms of  left chest discomfort, SOB have resolved.  Has some residual neck pressure. Prior symptoms likely due to healing rib fractures seen on recent PET. Recent CXR negative for fluid, but showed pulmonary hyperaeration which is sometimes associated with asthma or COPD. EKG normal. Back pain likely due to thoracic scoliosis and deferred pain from rib fractures. The SOB with lying flat is related to discomfort from scoliosis and uncomfortable pressure on certain points in her spine rather than lung pathology. Advised recreating PT environment by using padding (yoga mat or towel) behind neck or upper and mid back when completing the PT exercises at home. PFTs showed hyperinflation. -- Consultation with pulmonology scheduled for this month to evaluate hyperinflation seen on PFTs. -- DEXA scan to evaluate for osteoporosis given hx fractures - call with results. 3. Depression / ADHD / Anxiety: On Buproprion, Fluoxetine, Vyvanse. 4. Abdominal pain: Improved after cholecystectomy in Dec 2020. Likely unrelated to the lymphoma. We previously discussed that shon-menopausal state can cause GI upset as can factors such as diet, stress. Reviewed healthy options. 5. Diarrhea alternating with constipation:  May be related to lymphoma vs irritable bowel syndrome. Advised daily miralax to prevent constipation. Discussed adding more fiber to diet, at least 25 grams daily. 6. Bruising:  Likely due to SSRI. No significant bleeding episodes. 7. Vaccine counseling:  Had Covid booster and flu shot 12/14/21.     8. Fatigue:   Likely related to #1 and treatment. Encouraged light aerobic exercise. Emotional well being: Pt is coping well with his/her disease and has excellent support. I appreciate the opportunity to participate in Ms. Wu Barclay care. I personally provided this service today.      Signed By: Triny Paul NP     March 25, 2022

## 2022-03-29 ENCOUNTER — HOSPITAL ENCOUNTER (OUTPATIENT)
Dept: PET IMAGING | Age: 52
Discharge: HOME | End: 2022-03-29
Attending: INTERNAL MEDICINE
Payer: COMMERCIAL

## 2022-03-29 VITALS — WEIGHT: 155 LBS | HEIGHT: 67 IN | BODY MASS INDEX: 24.33 KG/M2

## 2022-03-29 DIAGNOSIS — C82.13 FOLLICULAR LYMPHOMA GRADE II OF INTRA-ABDOMINAL LYMPH NODES (HCC): ICD-10-CM

## 2022-03-29 LAB
GLUCOSE BLD STRIP.AUTO-MCNC: 87 MG/DL (ref 65–117)
SERVICE CMNT-IMP: NORMAL

## 2022-03-29 PROCEDURE — A9552 F18 FDG: HCPCS

## 2022-03-29 RX ORDER — FLUDEOXYGLUCOSE F-18 200 MCI/ML
10 INJECTION INTRAVENOUS ONCE
Status: COMPLETED | OUTPATIENT
Start: 2022-03-29 | End: 2022-03-29

## 2022-03-29 RX ADMIN — FLUDEOXYGLUCOSE F-18 9.92 MILLICURIE: 200 INJECTION INTRAVENOUS at 08:12

## 2022-03-31 ENCOUNTER — APPOINTMENT (OUTPATIENT)
Dept: INFUSION THERAPY | Age: 52
End: 2022-03-31
Payer: COMMERCIAL

## 2022-04-01 ENCOUNTER — APPOINTMENT (OUTPATIENT)
Dept: INFUSION THERAPY | Age: 52
End: 2022-04-01
Payer: COMMERCIAL

## 2022-04-04 ENCOUNTER — HOSPITAL ENCOUNTER (OUTPATIENT)
Dept: INFUSION THERAPY | Age: 52
Discharge: HOME | End: 2022-04-04
Payer: COMMERCIAL

## 2022-04-04 ENCOUNTER — OFFICE VISIT (OUTPATIENT)
Dept: ONCOLOGY | Age: 52
End: 2022-04-04
Payer: COMMERCIAL

## 2022-04-04 VITALS
OXYGEN SATURATION: 98 % | RESPIRATION RATE: 16 BRPM | WEIGHT: 156.6 LBS | BODY MASS INDEX: 24.58 KG/M2 | DIASTOLIC BLOOD PRESSURE: 60 MMHG | TEMPERATURE: 98.2 F | HEART RATE: 80 BPM | HEIGHT: 67 IN | SYSTOLIC BLOOD PRESSURE: 106 MMHG

## 2022-04-04 VITALS
TEMPERATURE: 98.2 F | SYSTOLIC BLOOD PRESSURE: 123 MMHG | OXYGEN SATURATION: 98 % | BODY MASS INDEX: 24.58 KG/M2 | WEIGHT: 156.6 LBS | HEART RATE: 89 BPM | HEIGHT: 67 IN | DIASTOLIC BLOOD PRESSURE: 58 MMHG

## 2022-04-04 DIAGNOSIS — Z51.11 CHEMOTHERAPY MANAGEMENT, ENCOUNTER FOR: ICD-10-CM

## 2022-04-04 DIAGNOSIS — C82.13 FOLLICULAR LYMPHOMA GRADE II OF INTRA-ABDOMINAL LYMPH NODES (HCC): Primary | ICD-10-CM

## 2022-04-04 DIAGNOSIS — S22.42XD CLOSED FRACTURE OF MULTIPLE RIBS OF LEFT SIDE WITH ROUTINE HEALING, SUBSEQUENT ENCOUNTER: Primary | ICD-10-CM

## 2022-04-04 DIAGNOSIS — R09.89 PULMONARY HYPERINFLATION: ICD-10-CM

## 2022-04-04 DIAGNOSIS — C82.13 FOLLICULAR LYMPHOMA GRADE II OF INTRA-ABDOMINAL LYMPH NODES (HCC): ICD-10-CM

## 2022-04-04 LAB
ALBUMIN SERPL-MCNC: 4 G/DL (ref 3.5–5)
ALBUMIN/GLOB SERPL: 1.3 {RATIO} (ref 1.1–2.2)
ALP SERPL-CCNC: 90 U/L (ref 45–117)
ALT SERPL-CCNC: 38 U/L (ref 12–78)
ANION GAP SERPL CALC-SCNC: 8 MMOL/L (ref 5–15)
AST SERPL-CCNC: 34 U/L (ref 15–37)
BASOPHILS # BLD: 0.1 K/UL (ref 0–0.1)
BASOPHILS NFR BLD: 2 % (ref 0–1)
BILIRUB SERPL-MCNC: 1 MG/DL (ref 0.2–1)
BUN SERPL-MCNC: 12 MG/DL (ref 6–20)
BUN/CREAT SERPL: 15 (ref 12–20)
CALCIUM SERPL-MCNC: 9.3 MG/DL (ref 8.5–10.1)
CHLORIDE SERPL-SCNC: 104 MMOL/L (ref 97–108)
CO2 SERPL-SCNC: 26 MMOL/L (ref 21–32)
CREAT SERPL-MCNC: 0.82 MG/DL (ref 0.55–1.02)
DIFFERENTIAL METHOD BLD: ABNORMAL
EOSINOPHIL # BLD: 0.3 K/UL (ref 0–0.4)
EOSINOPHIL NFR BLD: 8 % (ref 0–7)
ERYTHROCYTE [DISTWIDTH] IN BLOOD BY AUTOMATED COUNT: 13.3 % (ref 11.5–14.5)
GLOBULIN SER CALC-MCNC: 3.2 G/DL (ref 2–4)
GLUCOSE SERPL-MCNC: 102 MG/DL (ref 65–100)
HCT VFR BLD AUTO: 40.5 % (ref 35–47)
HGB BLD-MCNC: 13.8 G/DL (ref 11.5–16)
IMM GRANULOCYTES # BLD AUTO: 0 K/UL (ref 0–0.04)
IMM GRANULOCYTES NFR BLD AUTO: 1 % (ref 0–0.5)
LYMPHOCYTES # BLD: 0.3 K/UL (ref 0.8–3.5)
LYMPHOCYTES NFR BLD: 7 % (ref 12–49)
MCH RBC QN AUTO: 32.9 PG (ref 26–34)
MCHC RBC AUTO-ENTMCNC: 34.1 G/DL (ref 30–36.5)
MCV RBC AUTO: 96.4 FL (ref 80–99)
MONOCYTES # BLD: 0.9 K/UL (ref 0–1)
MONOCYTES NFR BLD: 22 % (ref 5–13)
NEUTS SEG # BLD: 2.6 K/UL (ref 1.8–8)
NEUTS SEG NFR BLD: 60 % (ref 32–75)
NRBC # BLD: 0 K/UL (ref 0–0.01)
NRBC BLD-RTO: 0 PER 100 WBC
PLATELET # BLD AUTO: 252 K/UL (ref 150–400)
PMV BLD AUTO: 8.9 FL (ref 8.9–12.9)
POTASSIUM SERPL-SCNC: 4 MMOL/L (ref 3.5–5.1)
PROT SERPL-MCNC: 7.2 G/DL (ref 6.4–8.2)
RBC # BLD AUTO: 4.2 M/UL (ref 3.8–5.2)
RBC MORPH BLD: ABNORMAL
SODIUM SERPL-SCNC: 138 MMOL/L (ref 136–145)
WBC # BLD AUTO: 4.2 K/UL (ref 3.6–11)

## 2022-04-04 PROCEDURE — 96411 CHEMO IV PUSH ADDL DRUG: CPT

## 2022-04-04 PROCEDURE — 96375 TX/PRO/DX INJ NEW DRUG ADDON: CPT

## 2022-04-04 PROCEDURE — 74011250636 HC RX REV CODE- 250/636: Performed by: NURSE PRACTITIONER

## 2022-04-04 PROCEDURE — 80053 COMPREHEN METABOLIC PANEL: CPT

## 2022-04-04 PROCEDURE — 74011000258 HC RX REV CODE- 258: Performed by: NURSE PRACTITIONER

## 2022-04-04 PROCEDURE — 96415 CHEMO IV INFUSION ADDL HR: CPT

## 2022-04-04 PROCEDURE — 74011250637 HC RX REV CODE- 250/637: Performed by: NURSE PRACTITIONER

## 2022-04-04 PROCEDURE — 85025 COMPLETE CBC W/AUTO DIFF WBC: CPT

## 2022-04-04 PROCEDURE — 96413 CHEMO IV INFUSION 1 HR: CPT

## 2022-04-04 PROCEDURE — 99213 OFFICE O/P EST LOW 20 MIN: CPT | Performed by: NURSE PRACTITIONER

## 2022-04-04 PROCEDURE — 36415 COLL VENOUS BLD VENIPUNCTURE: CPT

## 2022-04-04 RX ORDER — SODIUM CHLORIDE 0.9 % (FLUSH) 0.9 %
10 SYRINGE (ML) INJECTION AS NEEDED
Status: DISPENSED | OUTPATIENT
Start: 2022-04-04 | End: 2022-04-04

## 2022-04-04 RX ORDER — ACETAMINOPHEN 325 MG/1
650 TABLET ORAL ONCE
Status: COMPLETED | OUTPATIENT
Start: 2022-04-04 | End: 2022-04-04

## 2022-04-04 RX ORDER — SODIUM CHLORIDE 9 MG/ML
25 INJECTION, SOLUTION INTRAVENOUS CONTINUOUS
Status: DISPENSED | OUTPATIENT
Start: 2022-04-04 | End: 2022-04-04

## 2022-04-04 RX ORDER — HEPARIN 100 UNIT/ML
300-500 SYRINGE INTRAVENOUS AS NEEDED
Status: ACTIVE | OUTPATIENT
Start: 2022-04-04 | End: 2022-04-04

## 2022-04-04 RX ORDER — PALONOSETRON 0.05 MG/ML
0.25 INJECTION, SOLUTION INTRAVENOUS ONCE
Status: COMPLETED | OUTPATIENT
Start: 2022-04-04 | End: 2022-04-04

## 2022-04-04 RX ORDER — DIPHENHYDRAMINE HYDROCHLORIDE 50 MG/ML
50 INJECTION, SOLUTION INTRAMUSCULAR; INTRAVENOUS ONCE
Status: COMPLETED | OUTPATIENT
Start: 2022-04-04 | End: 2022-04-04

## 2022-04-04 RX ORDER — DEXAMETHASONE SODIUM PHOSPHATE 100 MG/10ML
10 INJECTION INTRAMUSCULAR; INTRAVENOUS ONCE
Status: COMPLETED | OUTPATIENT
Start: 2022-04-04 | End: 2022-04-04

## 2022-04-04 RX ORDER — SODIUM CHLORIDE 9 MG/ML
10 INJECTION INTRAMUSCULAR; INTRAVENOUS; SUBCUTANEOUS AS NEEDED
Status: ACTIVE | OUTPATIENT
Start: 2022-04-04 | End: 2022-04-04

## 2022-04-04 RX ADMIN — SODIUM CHLORIDE 683 MG: 9 INJECTION, SOLUTION INTRAVENOUS at 10:00

## 2022-04-04 RX ADMIN — PALONOSETRON 0.25 MG: 0.05 INJECTION, SOLUTION INTRAVENOUS at 11:43

## 2022-04-04 RX ADMIN — BENDAMUSTINE HYDROCHLORIDE 165 MG: 25 INJECTION, SOLUTION INTRAVENOUS at 12:26

## 2022-04-04 RX ADMIN — ACETAMINOPHEN 650 MG: 325 TABLET ORAL at 09:20

## 2022-04-04 RX ADMIN — DIPHENHYDRAMINE HYDROCHLORIDE 50 MG: 50 INJECTION, SOLUTION INTRAMUSCULAR; INTRAVENOUS at 09:24

## 2022-04-04 RX ADMIN — DEXAMETHASONE SODIUM PHOSPHATE 10 MG: 10 INJECTION INTRAMUSCULAR; INTRAVENOUS at 11:46

## 2022-04-04 RX ADMIN — SODIUM CHLORIDE 25 ML/HR: 9 INJECTION, SOLUTION INTRAVENOUS at 09:24

## 2022-04-04 NOTE — PROGRESS NOTES
Daly Jonas is a 47 y.o. female here for follow up of lymphoma. Patient with complaints of intermittent pain in lymph nodes of neck, rates as a 2 out of 10.

## 2022-04-04 NOTE — PATIENT INSTRUCTIONS
At least 30 gram fiber daily    Calcium 3 servings or 600-800mg calcium daily and 2000 international units vitamin D

## 2022-04-04 NOTE — PROGRESS NOTES
[facility] Progress Note    Date: 2022    Name: Tonya Carvalho    MRN: [id number]         : 1970    Ms. Man Arrived ambulatory and in no distress for cycle 4 day 1 ofRuxience and Bendamustine regimen. Assessment was completed, no acute issues at this time completed by [facility] RN no new complaints voiced. Left arm PIV accessed without difficulty, labs drawn and in process by Eugenia RN. Chemotherapy Flowsheet 2022   Cycle C4D1   Date 2022   Drug / Regimen Ruxience+Bendamustine   Pre Meds given   Notes given          Proceeded to appt with Dr. Florence Quintana    Ms. Man's vitals were reviewed. Visit Vitals  BP (!) 123/58   Pulse 89   Temp 98.2 °F (36.8 °C)   Resp 18   Ht 5' 7\" (1.702 m)   Wt 71 kg (156 lb 9.6 oz)   SpO2 98%   BMI 24.53 kg/m²       Lab results were obtained and reviewed. Recent Results (from the past 12 hour(s))   CBC WITH AUTOMATED DIFF    Collection Time: 22  7:56 AM   Result Value Ref Range    WBC 4.2 3.6 - 11.0 K/uL    RBC 4.20 3.80 - 5.20 M/uL    HGB 13.8 11.5 - 16.0 g/dL    HCT 40.5 35.0 - 47.0 %    MCV 96.4 80.0 - 99.0 FL    MCH 32.9 26.0 - 34.0 PG    MCHC 34.1 30.0 - 36.5 g/dL    RDW 13.3 11.5 - 14.5 %    PLATELET 803 820 - 863 K/uL    MPV 8.9 8.9 - 12.9 FL    NRBC 0.0 0  WBC    ABSOLUTE NRBC 0.00 0.00 - 0.01 K/uL    NEUTROPHILS 60 32 - 75 %    LYMPHOCYTES 7 (L) 12 - 49 %    MONOCYTES 22 (H) 5 - 13 %    EOSINOPHILS 8 (H) 0 - 7 %    BASOPHILS 2 (H) 0 - 1 %    IMMATURE GRANULOCYTES 1 (H) 0.0 - 0.5 %    ABS. NEUTROPHILS 2.6 1.8 - 8.0 K/UL    ABS. LYMPHOCYTES 0.3 (L) 0.8 - 3.5 K/UL    ABS. MONOCYTES 0.9 0.0 - 1.0 K/UL    ABS. EOSINOPHILS 0.3 0.0 - 0.4 K/UL    ABS. BASOPHILS 0.1 0.0 - 0.1 K/UL    ABS. IMM.  GRANS. 0.0 0.00 - 0.04 K/UL    DF SMEAR SCANNED      RBC COMMENTS NORMOCYTIC, NORMOCHROMIC     METABOLIC PANEL, COMPREHENSIVE    Collection Time: 22  7:56 AM   Result Value Ref Range    Sodium 138 136 - 145 mmol/L    Potassium 4.0 3.5 - 5.1 mmol/L Chloride 104 97 - 108 mmol/L    CO2 26 21 - 32 mmol/L    Anion gap 8 5 - 15 mmol/L    Glucose 102 (H) 65 - 100 mg/dL    BUN 12 6 - 20 MG/DL    Creatinine 0.82 0.55 - 1.02 MG/DL    BUN/Creatinine ratio 15 12 - 20      GFR est AA >60 >60 ml/min/1.73m2    GFR est non-AA >60 >60 ml/min/1.73m2    Calcium 9.3 8.5 - 10.1 MG/DL    Bilirubin, total 1.0 0.2 - 1.0 MG/DL    ALT (SGPT) 38 12 - 78 U/L    AST (SGOT) 34 15 - 37 U/L    Alk. phosphatase 90 45 - 117 U/L    Protein, total 7.2 6.4 - 8.2 g/dL    Albumin 4.0 3.5 - 5.0 g/dL    Globulin 3.2 2.0 - 4.0 g/dL    A-G Ratio 1.3 1.1 - 2.2         Pre-medications  were administered as ordered and chemotherapy was initiated.      Medications Administered     0.9% sodium chloride infusion     Admin Date  04/04/2022 Action  New Bag Dose  25 mL/hr Rate  25 mL/hr Route  IntraVENous Administered By  [employer]          acetaminophen (TYLENOL) tablet 650 mg     Admin Date  04/04/2022 Action  Given Dose  650 mg Route  Oral Administered By  [employer]          bendamustine 165 mg in 0.9% sodium chloride 50 mL, overfill volume 5 mL chemo infusion     Admin Date  04/04/2022 Action  New Bag Dose  165 mg Rate  369.6 mL/hr Route  IntraVENous Administered By  [employer]          dexamethasone (DECADRON) 10 mg/mL injection 10 mg     Admin Date  04/04/2022 Action  Given Dose  10 mg Route  IntraVENous Administered By  [employer]          diphenhydrAMINE (BENADRYL) injection 50 mg     Admin Date  04/04/2022 Action  Given Dose  50 mg Route  IntraVENous Administered By  [employer]          palonosetron HCl (ALOXI) injection 0.25 mg     Admin Date  04/04/2022 Action  Given Dose  0.25 mg Route  IntraVENous Administered By  [employer]          riTUXimab-pvvr (Purvi Vernon) 683 mg in 0.9% sodium chloride 250 mL RAPID infusion     Admin Date  04/04/2022 Action  New Bag Dose  683 mg Rate   Route  IntraVENous Administered By  Renetta Miller Date  04/04/2022 Action  Rate Change Dose   Rate  200 mL/hr Route  IntraVENous Administered By  Benito Alvarez                    Ms. Man tolerated treatment well and was discharged from [place] [address] in stable condition. She is to return on 4/5/22 for her next appointment.     [facility]  April 4, 2022

## 2022-04-05 ENCOUNTER — HOSPITAL ENCOUNTER (OUTPATIENT)
Dept: INFUSION THERAPY | Age: 52
Discharge: HOME | End: 2022-04-05
Payer: COMMERCIAL

## 2022-04-05 VITALS
WEIGHT: 156.59 LBS | SYSTOLIC BLOOD PRESSURE: 129 MMHG | HEIGHT: 67 IN | DIASTOLIC BLOOD PRESSURE: 74 MMHG | TEMPERATURE: 97 F | HEART RATE: 84 BPM | OXYGEN SATURATION: 98 % | BODY MASS INDEX: 24.58 KG/M2 | RESPIRATION RATE: 16 BRPM

## 2022-04-05 DIAGNOSIS — C82.13 FOLLICULAR LYMPHOMA GRADE II OF INTRA-ABDOMINAL LYMPH NODES (HCC): Primary | ICD-10-CM

## 2022-04-05 PROCEDURE — 74011250636 HC RX REV CODE- 250/636: Performed by: NURSE PRACTITIONER

## 2022-04-05 PROCEDURE — 96409 CHEMO IV PUSH SNGL DRUG: CPT

## 2022-04-05 PROCEDURE — 74011000258 HC RX REV CODE- 258: Performed by: NURSE PRACTITIONER

## 2022-04-05 PROCEDURE — 96375 TX/PRO/DX INJ NEW DRUG ADDON: CPT

## 2022-04-05 PROCEDURE — 96374 THER/PROPH/DIAG INJ IV PUSH: CPT

## 2022-04-05 PROCEDURE — 96413 CHEMO IV INFUSION 1 HR: CPT

## 2022-04-05 RX ORDER — HEPARIN 100 UNIT/ML
300-500 SYRINGE INTRAVENOUS AS NEEDED
Status: ACTIVE | OUTPATIENT
Start: 2022-04-05 | End: 2022-04-05

## 2022-04-05 RX ORDER — SODIUM CHLORIDE 9 MG/ML
10 INJECTION INTRAMUSCULAR; INTRAVENOUS; SUBCUTANEOUS AS NEEDED
Status: ACTIVE | OUTPATIENT
Start: 2022-04-05 | End: 2022-04-05

## 2022-04-05 RX ORDER — SODIUM CHLORIDE 9 MG/ML
25 INJECTION, SOLUTION INTRAVENOUS CONTINUOUS
Status: DISPENSED | OUTPATIENT
Start: 2022-04-05 | End: 2022-04-05

## 2022-04-05 RX ORDER — SODIUM CHLORIDE 0.9 % (FLUSH) 0.9 %
10 SYRINGE (ML) INJECTION AS NEEDED
Status: DISPENSED | OUTPATIENT
Start: 2022-04-05 | End: 2022-04-05

## 2022-04-05 RX ORDER — DEXAMETHASONE SODIUM PHOSPHATE 100 MG/10ML
10 INJECTION INTRAMUSCULAR; INTRAVENOUS ONCE
Status: COMPLETED | OUTPATIENT
Start: 2022-04-05 | End: 2022-04-05

## 2022-04-05 RX ADMIN — SODIUM CHLORIDE 25 ML/HR: 9 INJECTION, SOLUTION INTRAVENOUS at 09:53

## 2022-04-05 RX ADMIN — BENDAMUSTINE HYDROCHLORIDE 165 MG: 25 INJECTION, SOLUTION INTRAVENOUS at 10:30

## 2022-04-05 RX ADMIN — DEXAMETHASONE SODIUM PHOSPHATE 10 MG: 10 INJECTION INTRAMUSCULAR; INTRAVENOUS at 09:55

## 2022-04-05 NOTE — PROGRESS NOTES
Outpatient [facility]fusion [facility] Short Visit Progress Note     Patient admitted to [facility] for Bendamustine ambulatory in stable condition. Assessment completed. No new concerns voiced. Covid Screening      1. Do you have any symptoms of COVID-19? SOB, coughing, fever, or generally not feeling well ? NO  2. Have you been exposed to COVID-19 recently? NO  3. Have you had any recent contact with family/friend that has a pending COVID test? NO    Vital Signs:  Visit Vitals  /74   Pulse 84   Temp 97 °F (36.1 °C)   Resp 16   Ht 5' 7\" (1.702 m)   Wt 71 kg (156 lb 9.5 oz)   SpO2 98%   BMI 24.53 kg/m²         PIV #24 left wrist with positive blood return. Medications:  Medications Administered     0.9% sodium chloride infusion     Admin Date  04/05/2022 Action  New Bag Dose  25 mL/hr Rate  25 mL/hr Route  IntraVENous Administered By  Katelyn Farah RN          bendamustine 165 mg in 0.9% sodium chloride 50 mL, overfill volume 5 mL chemo infusion     Admin Date  04/05/2022 Action  New Bag Dose  165 mg Rate  369.6 mL/hr Route  IntraVENous Administered By  Katelyn Farah RN          dexamethasone (DECADRON) 10 mg/mL injection 10 mg     Admin Date  04/05/2022 Action  Given Dose  10 mg Route  IntraVENous Administered By  Katelyn Farah RN                 Patient tolerated treatment well. Patient discharged from [place] [address] ambulatory in no distress. Patient aware of next appointment.     Future Appointments   Date Time Provider Yvonne Stone   5/2/2022  7:30 AM SS INF4 CH2 >7H [facility]   5/2/2022  8:15 AM Cedric Winter NP [facility] AMB   5/3/2022 10:00 AM SS INF3 CH4 <2H [facility]   5/31/2022  7:30 [facility]   6/1/2022 10:00 AM SS INF3 CH4 <2H [facility] [address] [place] [address]

## 2022-04-22 RX ORDER — ACETAMINOPHEN 325 MG/1
650 TABLET ORAL AS NEEDED
Status: CANCELLED
Start: 2022-05-02

## 2022-04-22 RX ORDER — ALBUTEROL SULFATE 0.83 MG/ML
2.5 SOLUTION RESPIRATORY (INHALATION) AS NEEDED
Status: CANCELLED
Start: 2022-05-03

## 2022-04-22 RX ORDER — HEPARIN 100 UNIT/ML
300-500 SYRINGE INTRAVENOUS AS NEEDED
Status: CANCELLED
Start: 2022-05-03

## 2022-04-22 RX ORDER — DIPHENHYDRAMINE HYDROCHLORIDE 50 MG/ML
50 INJECTION, SOLUTION INTRAMUSCULAR; INTRAVENOUS AS NEEDED
Status: CANCELLED
Start: 2022-05-02

## 2022-04-22 RX ORDER — ONDANSETRON 2 MG/ML
8 INJECTION INTRAMUSCULAR; INTRAVENOUS AS NEEDED
Status: CANCELLED | OUTPATIENT
Start: 2022-05-03

## 2022-04-22 RX ORDER — ONDANSETRON 2 MG/ML
8 INJECTION INTRAMUSCULAR; INTRAVENOUS AS NEEDED
Status: CANCELLED | OUTPATIENT
Start: 2022-05-02

## 2022-04-22 RX ORDER — SODIUM CHLORIDE 9 MG/ML
10 INJECTION INTRAMUSCULAR; INTRAVENOUS; SUBCUTANEOUS AS NEEDED
Status: CANCELLED | OUTPATIENT
Start: 2022-05-03

## 2022-04-22 RX ORDER — EPINEPHRINE 1 MG/ML
0.3 INJECTION, SOLUTION, CONCENTRATE INTRAVENOUS AS NEEDED
Status: CANCELLED | OUTPATIENT
Start: 2022-05-02

## 2022-04-22 RX ORDER — DIPHENHYDRAMINE HYDROCHLORIDE 50 MG/ML
25 INJECTION, SOLUTION INTRAMUSCULAR; INTRAVENOUS AS NEEDED
Status: CANCELLED
Start: 2022-05-02

## 2022-04-22 RX ORDER — DEXAMETHASONE SODIUM PHOSPHATE 100 MG/10ML
10 INJECTION INTRAMUSCULAR; INTRAVENOUS ONCE
Status: CANCELLED | OUTPATIENT
Start: 2022-05-03 | End: 2022-05-03

## 2022-04-22 RX ORDER — DIPHENHYDRAMINE HYDROCHLORIDE 50 MG/ML
50 INJECTION, SOLUTION INTRAMUSCULAR; INTRAVENOUS AS NEEDED
Status: CANCELLED
Start: 2022-05-03

## 2022-04-22 RX ORDER — EPINEPHRINE 1 MG/ML
0.3 INJECTION, SOLUTION, CONCENTRATE INTRAVENOUS AS NEEDED
Status: CANCELLED | OUTPATIENT
Start: 2022-05-03

## 2022-04-22 RX ORDER — ALBUTEROL SULFATE 0.83 MG/ML
2.5 SOLUTION RESPIRATORY (INHALATION) AS NEEDED
Status: CANCELLED
Start: 2022-05-02

## 2022-04-22 RX ORDER — SODIUM CHLORIDE 9 MG/ML
25 INJECTION, SOLUTION INTRAVENOUS CONTINUOUS
Status: CANCELLED | OUTPATIENT
Start: 2022-05-03

## 2022-04-22 RX ORDER — HYDROCORTISONE SODIUM SUCCINATE 100 MG/2ML
100 INJECTION, POWDER, FOR SOLUTION INTRAMUSCULAR; INTRAVENOUS AS NEEDED
Status: CANCELLED | OUTPATIENT
Start: 2022-05-02

## 2022-04-22 RX ORDER — ACETAMINOPHEN 325 MG/1
650 TABLET ORAL AS NEEDED
Status: CANCELLED
Start: 2022-05-03

## 2022-04-22 RX ORDER — HYDROCORTISONE SODIUM SUCCINATE 100 MG/2ML
100 INJECTION, POWDER, FOR SOLUTION INTRAMUSCULAR; INTRAVENOUS AS NEEDED
Status: CANCELLED | OUTPATIENT
Start: 2022-05-03

## 2022-04-22 RX ORDER — DIPHENHYDRAMINE HYDROCHLORIDE 50 MG/ML
25 INJECTION, SOLUTION INTRAMUSCULAR; INTRAVENOUS AS NEEDED
Status: CANCELLED
Start: 2022-05-03

## 2022-04-22 RX ORDER — SODIUM CHLORIDE 0.9 % (FLUSH) 0.9 %
10 SYRINGE (ML) INJECTION AS NEEDED
Status: CANCELLED | OUTPATIENT
Start: 2022-05-03

## 2022-04-28 NOTE — PROGRESS NOTES
[id number] [facility] Oncology at [address] [place] [address]  [phone]    Hematology / Oncology Established Visit    Reason for Visit:   Lilibeth Vega is a 47 y.o. female who is seen for follow up of lymphoma. PCP is Dr. George Pérez. Hematology Oncology Treatment History:     Diagnosis: Follicular lymphoma    Stage: III; FLIPI2 low risk    Pathology:   -1/20/20 Right inguinal LN excision: Follicular lymphoma, grade 1-2, follicular pattern, HV36 positive. Comment:   The LN specimen demonstrates increased follicles, lacking normal germinal centers. IHC markers are performed with good controls. Tumor cells are positive for CD20 and BCL2. CD3 marks background T cells. Cyclin D1 is negative in the lymphoid population. CD21 highlights intact dendritic meshworks. Flow cytometry shows positive expression of CD10 and kappa LC restriction. Ki-67 is pending. Focally there are a few follicles with greater than fifteen centroblasts per HPF; however the majority of follicles have less than 15 centroblasts/HPF, consistent with grade 1-2. Diffuse areas are not seen.    -2/5/20 Bone marrow biopsy: Normocellular marrow with multilineage hematopoiesis and <1% involvement by a CD10 positive B-cell lymphoproliferative disorder. Negative for immunophenotypic or morphologic evidence of dysplasia no increase in blasts. Comment   Flow cytometric analysis reveals a small population of CD10 positive clonal B cells compatible with involvement by the patient's recently diagnosed follicular lymphoma. FISH studies are pending and will be reported. Prior Treatment: None    Current Treatment: Bendamustine-Rituximab x 6 cycles, started 1/2022    History of Present Illness:   Lilibeth Vega is a 47 y.o. female who comes in for evaluation of Follicular lymphoma. She states she has had vague symptoms for several years as follows. Pt reports h/o pruritus which started 6 yrs ago, which finally subsided with Zyrtec.  She saw a hematologist at that time, who told her symptoms might surface as a blood disorder in the future. In 2016, pt went to see her Gyn (Dr. Franca Swanson) for abdominal cramping. Workup normal at time time. In summer of 2019, pt developed fatigue as well as same of the chronic pain in abdominal area. In fall of 2019, she developed memory issues with work. She went to see her Psychiatrist due to these symptoms and switched her from generic Wellbutrin to brand name Wellbutrin. No improvement after 3-4 weeks. She was okay with sleeping during that time, but continued to have severe fatigue out of character for her. She went to see her PCP and described a right inguinal LN enlargement. She went to see a surgeon, Dr. Zach Dennis. Ultrasound of that region did demonstrate an enlarged LN. FNA was inconclusive. Core needle biopsy was suspicious for B cell lymphoma. Excisional LN biopsy 4/14/48 revealed follicular lymphoma. CT of chest/abd/pelvis was notable for retroperitoneal, pelvic and portacaval LAD. Pt also reports left hip pain, left foot tingling. No fevers, chills, weight loss. She does have occasional sweats more notable a few months ago, but not drenching or nightly. Patient underwent staging bone marrow biopsy and PET scan. Previously saw [facility] for a 2nd opinion. She was told her GI symptoms are not related to lymphoma. Interval History:  Patient comes in for C5 of BR. She has some chest congestion and cough that started a few days ago. No fevers, chills, sweats. Is tired today, but feels that her fatigue has overall improved compared to Dec 2021. Had some RLQ shooting pain which resolved spontaneously. Also had some constipation which resolved. PMHx: Chronic pain, Depression, Fibrocystic breast  PSurgHx: None aside from bilat breast biopsy and wisdom teeth extraction  SHx: Quit smoking 1/2000. Drinks 5 glasses of wine per week. FHx: Mother had breast cancer age 61. Father had CVA.    Review of Systems: A complete review of systems was obtained, negative except as described above. Physical Exam:     Visit Vitals  /60   Pulse 85   Temp 98.7 °F (37.1 °C) (Temporal)   Resp 18   Ht 5' 7\" (1.702 m)   Wt 154 lb (69.9 kg)   SpO2 95%   BMI 24.12 kg/m²     ECOG PS: 0  General: no distress  Eyes: anicteric sclerae  HENT: oropharynx clear  Neck: supple  Lymphatic: no cervical, axillary adenopathy; prior R inguinal LN and L supraclavicular LN no longer evident  Respiratory: normal respiratory effort  CV: no peripheral edema  GI: soft, nontender, nondistended, no masses  Skin: no rashes; no ecchymoses; no petechiae      Results:     Lab Results   Component Value Date/Time    WBC 3.3 (L) 05/02/2022 07:43 AM    HGB 13.3 05/02/2022 07:43 AM    HCT 39.5 05/02/2022 07:43 AM    PLATELET 261 15/78/8427 07:43 AM    .5 (H) 05/02/2022 07:43 AM    ABS. NEUTROPHILS 2.1 05/02/2022 07:43 AM     Lab Results   Component Value Date/Time    Sodium 138 05/02/2022 07:43 AM    Potassium 4.3 05/02/2022 07:43 AM    Chloride 106 05/02/2022 07:43 AM    CO2 26 05/02/2022 07:43 AM    Glucose 95 05/02/2022 07:43 AM    BUN 15 05/02/2022 07:43 AM    Creatinine 0.72 05/02/2022 07:43 AM    GFR est AA >60 05/02/2022 07:43 AM    GFR est non-AA >60 05/02/2022 07:43 AM    Calcium 8.7 05/02/2022 07:43 AM    Glucose (POC) 87 03/29/2022 08:07 AM     Lab Results   Component Value Date/Time    Bilirubin, total 0.3 05/02/2022 07:43 AM    ALT (SGPT) 45 05/02/2022 07:43 AM    Alk.  phosphatase 103 05/02/2022 07:43 AM    Protein, total 6.9 05/02/2022 07:43 AM    Albumin 4.0 05/02/2022 07:43 AM    Globulin 2.9 05/02/2022 07:43 AM     No results found for: IRON, FE, TIBC, IBCT, PSAT, FERR    No results found for: B12LT, FOL, RBCF  Lab Results   Component Value Date/Time    TSH 1.830 09/25/2019 12:45 PM     Lab Results   Component Value Date/Time    Hepatitis B surface Ag 0.40 01/06/2022 09:13 AM    Hepatitis B surface Ab <3.10 01/06/2022 09:13 AM Hep B Core Ab, total Negative 01/06/2022 09:13 AM     12/2/21 LD: 170    Imaging:     Chest CT  1/24/20:  FINDINGS:  The normal-sized axillary lymph nodes are unchanged. THYROID: No nodule. MEDIASTINUM: No mass or lymphadenopathy. MARCE: No mass or lymphadenopathy. THORACIC AORTA: No aneurysm. MAIN PULMONARY ARTERY: Normal in caliber. TRACHEA/BRONCHI: Patent. ESOPHAGUS: No wall thickening or dilatation. HEART: Normal in size. PLEURA: No effusion or pneumothorax. LUNGS: Laterally in the left lower lobe on image 53 is a 2 to 3 mm pulmonary  nodule which is unchanged when compared with the examination of 6/27/2017. This  is a benign nodule and not significant. .  There are normal size lymph nodes adjacent to the descending thoracic aorta and  paraspinal region which have increased in size but remain normal in size. .  Abdomen and pelvis: There has been interval development of adenopathy. In the portacaval space there  is a lymph node that measures 17 mm in short axis. There are retroperitoneal  lymph nodes that are enlarged as well. The largest lymph node in the  retroperitoneum is posterior to the inferior vena cava beginning at the level of  the right renal artery this extends inferiorly. This measures 2.4 x 1.0 x 4.0  cm. There are also enlarged lymph nodes in the left aortic region largest  measuring 12 mm in short axis. There are also lymph nodes posterior to the head of the pancreas and anterior to  the inferior vena cava which are enlarged the largest measures 10 mm in short  axis  There are enlarged lymph nodes in the interaortocaval space. Enlarged lymph  nodes along the right common iliac artery largest measuring approximately 12 mm  in short axis. There are enlarged lymph nodes in the right external iliac chain  largest measuring 9 mm. There is an enlarged lymph node along the right pelvic sidewall measuring 2.5 x  1.3 cm.  There are postsurgical changes in the right inguinal region with one  tiny locule of air. There are residual lymph nodes in the right inguinal largest  measuring 10 mm. .  The uterus images normally. There is a 1 cm cyst in the right ovary. There is no free fluid. Review of the bone windows reveals no destructive lesions. The liver and gallbladder are unremarkable. The spleen is normal in size and  configuration. The pancreas and adrenal glands and kidneys are unremarkable. The aorta and inferior vena cava are unremarkable. IMPRESSION:  1. There is is adenopathy as described. There is adenopathy in the right  inguinal region, along the right pelvic sidewall, along the right iliac chain,  bilateral retroperitoneum, and in the portacaval space as well as posterior to  the pancreas head. 2. In the posterior mediastinum adjacent to the descending thoracic aorta are  lymph nodes that measure less than 1 cm in size but these have increased in the  interval.  3. Please see above text    PET 2/10/20:   FINDINGS:  HEAD/NECK: No apparent foci of abnormal hypermetabolism. Cerebral evaluation is  limited by normal intense activity. CHEST: Mildly hypermetabolic left supraclavicular lymph node, maximum SUV 4.2. ABDOMEN/PELVIS: Mildly hypermetabolic portacaval, aortocaval, and left  para-aortic lymph nodes are noted, maximum SUV 5.5 of an aortocaval lymph node. Hypermetabolic right common iliac, right external iliac, right obturator, and  right inguinal lymph nodes.   SKELETON: No foci of abnormal hypermetabolism in the axial and visualized  appendicular skeleton. IMPRESSION: Mildly hypermetabolic left supraclavicular, abdominal,  retroperitoneal, and pelvic lymph nodes. Ch/abd/pelvis CT 5/7/20:  Lymph nodes in chest: There is no new mediastinal, hilar or axillary lymphadenopathy. Subcentimeter bilateral axillary lymph nodes are unchanged. Subcentimeter  posterior mediastinal lymph node abutting the descending thoracic aorta is  unchanged compared to prior CT dated January 2020.  1.3 cm x 1.1 cm left  supraclavicular lymph node is unchanged compared to prior CT dated January 2020. Lymph nodes in abdom/pelvis: There is a prominent portacaval lymph node measuring approximately  1.6 cm x 2.3 cm, unchanged compared to prior CT dated January 2020. There are multiple prominent and mildly enlarged retroperitoneal lymph nodes, unchanged  compared to prior CT dated January 2020. For example, there is a left  para-aortic retroperitoneal lymph node measures 1.2 cm x 1.6 cm, unchanged  compared to prior CT dated January 2020. As an additional example, there is a 9  mm x 1.3 cm aortocaval lymph node, unchanged compared to prior CT dated January 2020. Right iliac lymph nodes are unchanged compared to prior CT dated January 2020. For example, the largest right iliac lymph node measures 1.1 cm x 1.4 cm,  unchanged compared to prior CT dated January 2020. Right internal obturator  lymph node is unchanged compared to prior CT dated January 2020) measuring  approximately 2.8 cm x 1.4 cm. Prominent right inguinal lymph nodes are not  significantly changed in size. For example, there is a 1 cm x 1.4 cm right  inguinal lymph node, unchanged compared to prior CT dated January 2020. IMPRESSION: No interval change. Discussed with radiology - the right RP LN near R renal artery is unchanged. The 4cm dimension is craniocaudal.    11/9/20 CT c/a/p:  FINDINGS:   LYMPH NODES: There is a prominent portacaval lymph node measuring approximately  1.8 x 2.0 cm, not significantly changed from prior measurements of 1.6 cm x 2.3  cm. There are  multiple prominent and mildly enlarged retroperitoneal lymph nodes, not  significantly changed from prior. For example, there is a left para-aortic  retroperitoneal lymph node that measures 1.1 x 1.7 cm, not significantly changed  from prior measurements of 1.2 x 1.6 and a 10 x 14 mm aortocaval lymph node, not  significantly changed from prior measurements of 9 x 13 mm.  Right iliac lymph  nodes are little changed, though the largest has slightly increased in size  measuring 1.3 x 1.6 cm, previously 1.1 x 1.4 cm. Right internal obturator is not  significantly changed measuring 1.1 x 2.7 cm, previously 1.3 x 3.0 cm. Prominent  right inguinal lymph nodes are not significantly changed in size measuring 1.0 x  1.4 cm. IMPRESSION: No significant interval change apart from a single right common  iliac pelvic lymph nodes which shows slight increase in size by measurement from  1.1 x 1.4 cm to 1.3 x 1.6 cm.    11/18/21 CT ch/abd/pelvis:  LYMPH NODES: Extensive increased retroperitoneal and portacaval adenopathy. 3-67 aortocaval node 25 x 36 mm previously 18 x 9 mm.  3-71 left retroperitoneal node 31 x 26 mm on the previous exam 17 x 11 mm. Right lower quadrant mesenteric adenopathy 3-78 25 x 30 mm previously 14 x 10  mm. VASCULATURE: No aneurysm or dissection. REPRODUCTIVE ORGANS: Uterus and ovaries are unremarkable to  URINARY BLADDER: No mass or calculus. BONES: No destructive bone lesion. ABDOMINAL WALL: No mass or hernia. ADDITIONAL COMMENTS: N/A  IMPRESSION  Imaging findings are consistent with interval progression of disease. Extensive increased mesenteric and retroperitoneal lymphadenopathy with index  lesion measurements as described above. 12/10/21 PET:  FINDINGS:  HEAD/NECK: No apparent foci of abnormal hypermetabolism. Cerebral evaluation is  limited by normal intense activity. CHEST: Left supraclavicular adenopathy SUV 7.5, previous 4.2. New left internal  mammary SUV 3.3. Right diaphragmatic node with SUV 5.  ABDOMEN/PELVIS: Extensive new hypermetabolic and enlarged nodes: upper abdomen  (SUV 9), mesentery, retrocrural, retroperitoneal, right pelvic (SUV 6) and right  groin (SUV 4.5). A previously measured left aortic node is SUV 6.4, previous  5.5. No splenic activity.   SKELETON: No foci of abnormal hypermetabolism in the axial and visualized  appendicular skeleton. IMPRESSION  New/progressed multi level bill disease (Deauville 5).    Each FDG-avid (or previously FDG avid) lesion is rated independently. Reference values:  Mediastinal blood pool: 1.9 SUV  Liver (background): 2.2 SUV    2/17/22 CXR  FINDINGS: The cardiac silhouette is normal in size. There is hyperaeration of  the lungs. There is no evidence of active lung disease. There is evidence of  mild, mid thoracic scoliosis convex to the right. IMPRESSION  Evidence of pulmonary hyperaeration. No evidence of active lung disease. 3/29/22 PET:   FINDINGS:  HEAD/NECK: Maxillary activity is again noted and likely related to underlying  dental disease. Cerebral evaluation is limited by normal intense activity. CHEST: No foci of abnormal hypermetabolism. Resolved left supraclavicular, and  LC, mediastinal, and right cardiophrenic angle lymph node activity. ABDOMEN/PELVIS: No foci of abnormal hypermetabolism. Resolved abdominal,  retrocrural, retroperitoneal, pelvic, and inguinal lymph node activity. SKELETON: Mildly hypermetabolic healing left anterior rib fractures are noted. IMPRESSION  Resolution of all previously described areas of lymph node activity  (all previously described lesions now Deauville 1). Increased tracer activity  corresponds to healing left anterior rib fractures. Maxillary activity likely  related to dental disease. Assessment & Plan:   Gavi Gonzales is a 47 y.o. female with Depression comes in for evaluation and management of Follicular lymphoma. 1. Follicular lymphoma / Fatigue:   Stage III (based on L supraclav and abd/pelvis LAD). Normal LDH and no cytopenias or B symptoms present. Discussed that this is an indolent lymphoma which does not necessarily recommend treatment at time of diagnosis. Patients are observed with H&P and labs every 3-6 months. Treatment is for those who develop B symptoms, cytopenias or bulky disease (LN >7cm).  At this time, I am unclear whether patient's symptoms are related to the disease. Furthermore, there are no \"bulky\" LNs > 7cm, but there is a 4cm LN in the right retroperitoneal LN region which if it increased significantly in size could risk of compression of renal vasculature. Bone marrow biopsy shows < 1% monoclonal CD10 positive B cell population (seen on flow cytometry), which is considered negative for BM involvement. PET 2/2020 showed only mildly hypermetabolic uptake with max SUV of 5.5. Previously discussed that treatment options for Stage III, grade 1/2 FL include observation vs chemoimmunotherapy with BR regimen vs single agent Rituxan. CT 11/2021 and 12/2021 PET imaging showed interval progression of mesenteric and retroperitoneal LAD. Pt also with worsening fatigue. At this time, I recommend treatment with BR regimen given local GI symptoms related to mesenteric LAD and fatigue. We discussed the risks and benefits of R-Bendamustine chemotherapy. The potential side effects include, but are not limited to: nausea, vomiting, diarrhea, constipation, flu-like symptoms, infusion reaction, allergic reaction, flushing, taste changes, increased risk of infection, anemia, fatigue, alopecia, mucositis, myelosuppression, infertility and rarely, death. The patient has consented to begin therapy. Supportive medications include: ondansetron, prochlorperazine. 3/29/22 mid-treatment PET showed resolution of all previously described areas of lymph node activity. -- Proceed with C5 of BR regimen today  -- Repeat PET at completion of treatment. -- Return in 4 weeks for C6, MD/NP visit. 2. Chest and neck pressure / Back pain / SOB:   Symptoms of  left chest discomfort, SOB have resolved. Has some residual neck pressure. Prior symptoms likely due to healing rib fractures seen on recent PET. Recent CXR negative for fluid, but showed pulmonary hyperaeration which is sometimes associated with asthma or COPD.  EKG normal. Back pain likely due to thoracic scoliosis and deferred pain from rib fractures. The SOB with lying flat is related to discomfort from scoliosis and uncomfortable pressure on certain points in her spine rather than lung pathology. Advised recreating PT environment by using padding (yoga mat or towel) behind neck or upper and mid back when completing the PT exercises at home. PFTs showed hyperinflation. -- Consultation with pulmonology scheduled for this month to evaluate hyperinflation seen on PFTs. -- DEXA scan to evaluate for osteoporosis given hx fractures - call with results. 3. Depression / ADHD / Anxiety: On Buproprion, Fluoxetine, Vyvanse. 4. H/o abdominal pain: Improved after cholecystectomy in Dec 2020. Likely unrelated to the lymphoma. We previously discussed that shon-menopausal state can cause GI upset as can factors such as diet, stress. Reviewed healthy options. 5. Diarrhea alternating with constipation:  Since no lymphoma evident on imaging any longer, I suspect this is 2/2 irritable bowel syndrome. Advised daily miralax to prevent constipation. Discussed adding more fiber to diet, at least 25 grams daily. 6. Bruising:  Likely due to SSRI. No significant bleeding episodes. 7. Vaccine counseling:  Had Covid booster and flu shot 12/14/21.     8. Fatigue:   Likely related to #1 and treatment. Previously encouraged light aerobic exercise. Emotional well being: Pt is coping well with his/her disease and has excellent support. I appreciate the opportunity to participate in Ms. Mateus bowser. I personally provided this service today.      Signed By: Linh Kapadia MD     May 2, 2022

## 2022-05-02 ENCOUNTER — HOSPITAL ENCOUNTER (OUTPATIENT)
Dept: INFUSION THERAPY | Age: 52
Discharge: HOME | End: 2022-05-02
Payer: COMMERCIAL

## 2022-05-02 ENCOUNTER — OFFICE VISIT (OUTPATIENT)
Dept: ONCOLOGY | Age: 52
End: 2022-05-02
Payer: COMMERCIAL

## 2022-05-02 VITALS
RESPIRATION RATE: 18 BRPM | TEMPERATURE: 98.7 F | HEIGHT: 67 IN | BODY MASS INDEX: 24.17 KG/M2 | DIASTOLIC BLOOD PRESSURE: 60 MMHG | OXYGEN SATURATION: 95 % | SYSTOLIC BLOOD PRESSURE: 108 MMHG | WEIGHT: 154 LBS | HEART RATE: 85 BPM

## 2022-05-02 VITALS
DIASTOLIC BLOOD PRESSURE: 54 MMHG | BODY MASS INDEX: 24.25 KG/M2 | RESPIRATION RATE: 18 BRPM | OXYGEN SATURATION: 94 % | HEIGHT: 67 IN | HEART RATE: 77 BPM | WEIGHT: 154.5 LBS | TEMPERATURE: 98 F | SYSTOLIC BLOOD PRESSURE: 106 MMHG

## 2022-05-02 DIAGNOSIS — C82.13 FOLLICULAR LYMPHOMA GRADE II OF INTRA-ABDOMINAL LYMPH NODES (HCC): Primary | ICD-10-CM

## 2022-05-02 DIAGNOSIS — R53.82 CHRONIC FATIGUE: ICD-10-CM

## 2022-05-02 DIAGNOSIS — K59.09 OTHER CONSTIPATION: ICD-10-CM

## 2022-05-02 DIAGNOSIS — C82.13 FOLLICULAR LYMPHOMA GRADE II OF INTRA-ABDOMINAL LYMPH NODES (HCC): ICD-10-CM

## 2022-05-02 DIAGNOSIS — R09.89 PULMONARY HYPERINFLATION: ICD-10-CM

## 2022-05-02 DIAGNOSIS — Z51.11 CHEMOTHERAPY MANAGEMENT, ENCOUNTER FOR: Primary | ICD-10-CM

## 2022-05-02 DIAGNOSIS — S22.42XD CLOSED FRACTURE OF MULTIPLE RIBS OF LEFT SIDE WITH ROUTINE HEALING, SUBSEQUENT ENCOUNTER: ICD-10-CM

## 2022-05-02 LAB
ALBUMIN SERPL-MCNC: 4 G/DL (ref 3.5–5)
ALBUMIN/GLOB SERPL: 1.4 {RATIO} (ref 1.1–2.2)
ALP SERPL-CCNC: 103 U/L (ref 45–117)
ALT SERPL-CCNC: 45 U/L (ref 12–78)
ANION GAP SERPL CALC-SCNC: 6 MMOL/L (ref 5–15)
AST SERPL-CCNC: 38 U/L (ref 15–37)
BASOPHILS # BLD: 0 K/UL (ref 0–0.1)
BASOPHILS NFR BLD: 1 % (ref 0–1)
BILIRUB SERPL-MCNC: 0.3 MG/DL (ref 0.2–1)
BUN SERPL-MCNC: 15 MG/DL (ref 6–20)
BUN/CREAT SERPL: 21 (ref 12–20)
CALCIUM SERPL-MCNC: 8.7 MG/DL (ref 8.5–10.1)
CHLORIDE SERPL-SCNC: 106 MMOL/L (ref 97–108)
CO2 SERPL-SCNC: 26 MMOL/L (ref 21–32)
CREAT SERPL-MCNC: 0.72 MG/DL (ref 0.55–1.02)
DIFFERENTIAL METHOD BLD: ABNORMAL
EOSINOPHIL # BLD: 0 K/UL (ref 0–0.4)
EOSINOPHIL NFR BLD: 0 % (ref 0–7)
ERYTHROCYTE [DISTWIDTH] IN BLOOD BY AUTOMATED COUNT: 12.9 % (ref 11.5–14.5)
GLOBULIN SER CALC-MCNC: 2.9 G/DL (ref 2–4)
GLUCOSE SERPL-MCNC: 95 MG/DL (ref 65–100)
HCT VFR BLD AUTO: 39.5 % (ref 35–47)
HGB BLD-MCNC: 13.3 G/DL (ref 11.5–16)
IMM GRANULOCYTES # BLD AUTO: 0 K/UL
IMM GRANULOCYTES NFR BLD AUTO: 0 %
LYMPHOCYTES # BLD: 0.3 K/UL (ref 0.8–3.5)
LYMPHOCYTES NFR BLD: 10 % (ref 12–49)
MCH RBC QN AUTO: 33.8 PG (ref 26–34)
MCHC RBC AUTO-ENTMCNC: 33.7 G/DL (ref 30–36.5)
MCV RBC AUTO: 100.5 FL (ref 80–99)
MONOCYTES # BLD: 0.9 K/UL (ref 0–1)
MONOCYTES NFR BLD: 27 % (ref 5–13)
NEUTS BAND NFR BLD MANUAL: 4 % (ref 0–6)
NEUTS SEG # BLD: 2.1 K/UL (ref 1.8–8)
NEUTS SEG NFR BLD: 58 % (ref 32–75)
NRBC # BLD: 0 K/UL (ref 0–0.01)
NRBC BLD-RTO: 0 PER 100 WBC
PLATELET # BLD AUTO: 236 K/UL (ref 150–400)
PMV BLD AUTO: 8.8 FL (ref 8.9–12.9)
POTASSIUM SERPL-SCNC: 4.3 MMOL/L (ref 3.5–5.1)
PROT SERPL-MCNC: 6.9 G/DL (ref 6.4–8.2)
RBC # BLD AUTO: 3.93 M/UL (ref 3.8–5.2)
RBC MORPH BLD: ABNORMAL
SODIUM SERPL-SCNC: 138 MMOL/L (ref 136–145)
WBC # BLD AUTO: 3.3 K/UL (ref 3.6–11)

## 2022-05-02 PROCEDURE — 85025 COMPLETE CBC W/AUTO DIFF WBC: CPT

## 2022-05-02 PROCEDURE — 74011000258 HC RX REV CODE- 258: Performed by: INTERNAL MEDICINE

## 2022-05-02 PROCEDURE — 74011250637 HC RX REV CODE- 250/637: Performed by: INTERNAL MEDICINE

## 2022-05-02 PROCEDURE — 74011250636 HC RX REV CODE- 250/636: Performed by: INTERNAL MEDICINE

## 2022-05-02 PROCEDURE — 36415 COLL VENOUS BLD VENIPUNCTURE: CPT

## 2022-05-02 PROCEDURE — 96375 TX/PRO/DX INJ NEW DRUG ADDON: CPT

## 2022-05-02 PROCEDURE — 96413 CHEMO IV INFUSION 1 HR: CPT

## 2022-05-02 PROCEDURE — 99215 OFFICE O/P EST HI 40 MIN: CPT | Performed by: INTERNAL MEDICINE

## 2022-05-02 PROCEDURE — 96411 CHEMO IV PUSH ADDL DRUG: CPT

## 2022-05-02 PROCEDURE — 80053 COMPREHEN METABOLIC PANEL: CPT

## 2022-05-02 RX ORDER — ACETAMINOPHEN 325 MG/1
650 TABLET ORAL ONCE
Status: COMPLETED | OUTPATIENT
Start: 2022-05-02 | End: 2022-05-02

## 2022-05-02 RX ORDER — DIPHENHYDRAMINE HYDROCHLORIDE 50 MG/ML
50 INJECTION, SOLUTION INTRAMUSCULAR; INTRAVENOUS ONCE
Status: COMPLETED | OUTPATIENT
Start: 2022-05-02 | End: 2022-05-02

## 2022-05-02 RX ORDER — SODIUM CHLORIDE 9 MG/ML
10 INJECTION INTRAMUSCULAR; INTRAVENOUS; SUBCUTANEOUS AS NEEDED
Status: ACTIVE | OUTPATIENT
Start: 2022-05-02 | End: 2022-05-02

## 2022-05-02 RX ORDER — SODIUM CHLORIDE 9 MG/ML
25 INJECTION, SOLUTION INTRAVENOUS CONTINUOUS
Status: DISPENSED | OUTPATIENT
Start: 2022-05-02 | End: 2022-05-02

## 2022-05-02 RX ORDER — HEPARIN 100 UNIT/ML
300-500 SYRINGE INTRAVENOUS AS NEEDED
Status: ACTIVE | OUTPATIENT
Start: 2022-05-02 | End: 2022-05-02

## 2022-05-02 RX ORDER — DEXAMETHASONE SODIUM PHOSPHATE 100 MG/10ML
10 INJECTION INTRAMUSCULAR; INTRAVENOUS ONCE
Status: COMPLETED | OUTPATIENT
Start: 2022-05-02 | End: 2022-05-02

## 2022-05-02 RX ORDER — PALONOSETRON 0.05 MG/ML
0.25 INJECTION, SOLUTION INTRAVENOUS ONCE
Status: COMPLETED | OUTPATIENT
Start: 2022-05-02 | End: 2022-05-02

## 2022-05-02 RX ORDER — SODIUM CHLORIDE 0.9 % (FLUSH) 0.9 %
10 SYRINGE (ML) INJECTION AS NEEDED
Status: DISPENSED | OUTPATIENT
Start: 2022-05-02 | End: 2022-05-02

## 2022-05-02 RX ADMIN — SODIUM CHLORIDE 25 ML/HR: 9 INJECTION, SOLUTION INTRAVENOUS at 09:17

## 2022-05-02 RX ADMIN — SODIUM CHLORIDE 683 MG: 9 INJECTION, SOLUTION INTRAVENOUS at 10:10

## 2022-05-02 RX ADMIN — DEXAMETHASONE SODIUM PHOSPHATE 10 MG: 10 INJECTION INTRAMUSCULAR; INTRAVENOUS at 09:25

## 2022-05-02 RX ADMIN — PALONOSETRON HYDROCHLORIDE 0.25 MG: 0.25 INJECTION INTRAVENOUS at 09:24

## 2022-05-02 RX ADMIN — DIPHENHYDRAMINE HYDROCHLORIDE 50 MG: 50 INJECTION, SOLUTION INTRAMUSCULAR; INTRAVENOUS at 09:27

## 2022-05-02 RX ADMIN — ACETAMINOPHEN 650 MG: 325 TABLET ORAL at 09:22

## 2022-05-02 RX ADMIN — BENDAMUSTINE HYDROCHLORIDE 165 MG: 25 INJECTION, SOLUTION INTRAVENOUS at 12:20

## 2022-05-02 NOTE — PROGRESS NOTES
[facility] VISIT NOTE  Date: May 2, 2022    Name: Lesley Pillai    MRN: [id number]         : 1970    Ms. Man Arrived ambulatory and in no distress for C5D1 of Ruxience+ Bendamustine Regimen. Assessment was completed by Eugenia, no acute issues at this time, pt complained of chest cold. RFA 24G PIV placed by assessment nurse without difficulty, labs drawn & sent for processing. 1. Do you have any symptoms of COVID-19? SOB, coughing, fever, or generally not feeling well NO    2. Have you been exposed to COVID-19 recently? NO    3. Have you had any recent contact with family/friend that has a pending COVID test? NO       Chemotherapy Flowsheet 2022   Cycle C5D1   Date 2022   Drug / Regimen Ruxience+Bendamustine   Pre Meds given   Notes given           Ms. Man's vitals were reviewed. Patient Vitals for the past 12 hrs:   Temp Pulse Resp BP SpO2   22 1234 98 °F (36.7 °C) 77 18 (!) 106/54 94 %   22 1040 98.9 °F (37.2 °C) 76 18 108/62 94 %   22 0734 98.7 °F (37.1 °C) 85 18 108/60 95 %         Lab results were obtained and reviewed. Recent Results (from the past 12 hour(s))   CBC WITH AUTOMATED DIFF    Collection Time: 22  7:43 AM   Result Value Ref Range    WBC 3.3 (L) 3.6 - 11.0 K/uL    RBC 3.93 3.80 - 5.20 M/uL    HGB 13.3 11.5 - 16.0 g/dL    HCT 39.5 35.0 - 47.0 %    .5 (H) 80.0 - 99.0 FL    MCH 33.8 26.0 - 34.0 PG    MCHC 33.7 30.0 - 36.5 g/dL    RDW 12.9 11.5 - 14.5 %    PLATELET 903 217 - 663 K/uL    MPV 8.8 (L) 8.9 - 12.9 FL    NRBC 0.0 0  WBC    ABSOLUTE NRBC 0.00 0.00 - 0.01 K/uL    NEUTROPHILS 58 32 - 75 %    BAND NEUTROPHILS 4 0 - 6 %    LYMPHOCYTES 10 (L) 12 - 49 %    MONOCYTES 27 (H) 5 - 13 %    EOSINOPHILS 0 0 - 7 %    BASOPHILS 1 0 - 1 %    IMMATURE GRANULOCYTES 0 %    ABS. NEUTROPHILS 2.1 1.8 - 8.0 K/UL    ABS. LYMPHOCYTES 0.3 (L) 0.8 - 3.5 K/UL    ABS. MONOCYTES 0.9 0.0 - 1.0 K/UL    ABS. EOSINOPHILS 0.0 0.0 - 0.4 K/UL    ABS. BASOPHILS 0.0 0.0 - 0.1 K/UL    ABS. IMM. GRANS. 0.0 K/UL    DF MANUAL      RBC COMMENTS MACROCYTOSIS  1+       METABOLIC PANEL, COMPREHENSIVE    Collection Time: 05/02/22  7:43 AM   Result Value Ref Range    Sodium 138 136 - 145 mmol/L    Potassium 4.3 3.5 - 5.1 mmol/L    Chloride 106 97 - 108 mmol/L    CO2 26 21 - 32 mmol/L    Anion gap 6 5 - 15 mmol/L    Glucose 95 65 - 100 mg/dL    BUN 15 6 - 20 MG/DL    Creatinine 0.72 0.55 - 1.02 MG/DL    BUN/Creatinine ratio 21 (H) 12 - 20      GFR est AA >60 >60 ml/min/1.73m2    GFR est non-AA >60 >60 ml/min/1.73m2    Calcium 8.7 8.5 - 10.1 MG/DL    Bilirubin, total 0.3 0.2 - 1.0 MG/DL    ALT (SGPT) 45 12 - 78 U/L    AST (SGOT) 38 (H) 15 - 37 U/L    Alk.  phosphatase 103 45 - 117 U/L    Protein, total 6.9 6.4 - 8.2 g/dL    Albumin 4.0 3.5 - 5.0 g/dL    Globulin 2.9 2.0 - 4.0 g/dL    A-G Ratio 1.4 1.1 - 2.2         Medications received:  Medications Administered     0.9% sodium chloride infusion     Admin Date  05/02/2022 Action  New Bag Dose  25 mL/hr Rate  25 mL/hr Route  IntraVENous Administered By  Valentino Buerger, RN          acetaminophen (TYLENOL) tablet 650 mg     Admin Date  05/02/2022 Action  Given Dose  650 mg Route  Oral Administered By  Valentino Buerger, RN          bendamustine 165 mg in 0.9% sodium chloride 50 mL, overfill volume 5 mL chemo infusion     Admin Date  05/02/2022 Action  New Bag Dose  165 mg Rate  369.6 mL/hr Route  IntraVENous Administered By  Valentino Buerger, RN          dexamethasone (DECADRON) 10 mg/mL injection 10 mg     Admin Date  05/02/2022 Action  Given Dose  10 mg Route  IntraVENous Administered By  Valentino Buerger, RN          diphenhydrAMINE (BENADRYL) injection 50 mg     Admin Date  05/02/2022 Action  Given Dose  50 mg Route  IntraVENous Administered By  Valentino Buerger, RN          palonosetron HCl (ALOXI) injection 0.25 mg     Admin Date  05/02/2022 Action  Given Dose  0.25 mg Route  IntraVENous Administered By  Valentino Buerger, RN          riTUXimab-pvvr (RUXIENCE) 683 mg in 0.9% sodium chloride 250 mL RAPID infusion     Admin Date  05/02/2022 Action  New Bag Dose  683 mg Rate   Route  IntraVENous Administered By  Valentino Buerger, RN           Admin Date  05/02/2022 Action  Rate Change Dose   Rate  200 mL/hr Route  IntraVENous Administered By  Valentino Buerger, RN                 Ms. Oneal Kennedy tolerated treatment well and was discharged from [place] [address] in stable condition at 1240. PIV flushed  And removed per protocol. She is to return on  May 3, 2022 at 1000 for her next appointment. Junaid Cazares RN  May 2, 2022    Future Appointments:  Future Appointments   Date Time Provider Yvonne Stone   5/3/2022 10:00 AM SS INF3 CH4 <2H [facility]   5/6/2022  9:00 AM [facility] DEXA 1 [facility]   5/31/2022  7:30 AM SS INF4 CH2 >7H [facility]   5/31/2022  8:30 AM Candis Heath MD [facility] AMB   6/1/2022 10:00 AM SS INF3 CH4 <2H [facility]   6/27/2022  7:30 AM SS INF4 CH2 >7H [facility]   6/28/2022 10:00 AM SS INF3 CH4 <2H [facility] [address] [place] [address]

## 2022-05-03 ENCOUNTER — HOSPITAL ENCOUNTER (OUTPATIENT)
Dept: INFUSION THERAPY | Age: 52
Discharge: HOME | End: 2022-05-03
Payer: COMMERCIAL

## 2022-05-03 VITALS
WEIGHT: 156.3 LBS | HEIGHT: 67 IN | SYSTOLIC BLOOD PRESSURE: 106 MMHG | BODY MASS INDEX: 24.53 KG/M2 | HEART RATE: 73 BPM | OXYGEN SATURATION: 96 % | TEMPERATURE: 97.3 F | RESPIRATION RATE: 18 BRPM | DIASTOLIC BLOOD PRESSURE: 61 MMHG

## 2022-05-03 DIAGNOSIS — C82.13 FOLLICULAR LYMPHOMA GRADE II OF INTRA-ABDOMINAL LYMPH NODES (HCC): Primary | ICD-10-CM

## 2022-05-03 PROCEDURE — 74011250636 HC RX REV CODE- 250/636: Performed by: INTERNAL MEDICINE

## 2022-05-03 PROCEDURE — 96409 CHEMO IV PUSH SNGL DRUG: CPT

## 2022-05-03 PROCEDURE — 96375 TX/PRO/DX INJ NEW DRUG ADDON: CPT

## 2022-05-03 PROCEDURE — 74011000258 HC RX REV CODE- 258: Performed by: INTERNAL MEDICINE

## 2022-05-03 RX ORDER — DEXAMETHASONE SODIUM PHOSPHATE 100 MG/10ML
10 INJECTION INTRAMUSCULAR; INTRAVENOUS ONCE
Status: COMPLETED | OUTPATIENT
Start: 2022-05-03 | End: 2022-05-03

## 2022-05-03 RX ORDER — SODIUM CHLORIDE 9 MG/ML
25 INJECTION, SOLUTION INTRAVENOUS CONTINUOUS
Status: DISPENSED | OUTPATIENT
Start: 2022-05-03 | End: 2022-05-03

## 2022-05-03 RX ADMIN — DEXAMETHASONE SODIUM PHOSPHATE 10 MG: 10 INJECTION INTRAMUSCULAR; INTRAVENOUS at 10:36

## 2022-05-03 RX ADMIN — SODIUM CHLORIDE 25 ML/HR: 9 INJECTION, SOLUTION INTRAVENOUS at 10:28

## 2022-05-03 RX ADMIN — BENDAMUSTINE HYDROCHLORIDE 165 MG: 25 INJECTION, SOLUTION INTRAVENOUS at 11:31

## 2022-05-03 NOTE — PROGRESS NOTES
[facility]fusion [facility] Short Visit Progress Note    Patient admitted to [facility] for Bendamustine ambulatory in stable condition. Assessment completed. No new concerns voiced. RFA 24G PIV inserted without difficulty with blood return. Covid Screening      1. Do you have any symptoms of COVID-19? SOB, coughing, fever, or generally not feeling well ? NO  2. Have you been exposed to COVID-19 recently? NO  3. Have you had any recent contact with family/friend that has a pending COVID test? NO    Vital Signs:  Visit Vitals  /61   Pulse 73   Temp 97.3 °F (36.3 °C)   Resp 18   Ht 5' 7\" (1.702 m)   Wt 70.9 kg (156 lb 4.8 oz)   SpO2 96%   BMI 24.48 kg/m²     Patient Vitals for the past 12 hrs:   Temp Pulse Resp BP SpO2   05/03/22 1147 -- 73 18 106/61 96 %   05/03/22 1146 97.3 °F (36.3 °C) -- -- -- --   05/03/22 1017 97.7 °F (36.5 °C) 88 18 122/71 97 %           Lab Results:  N/A        Medications:  Medications Administered     0.9% sodium chloride infusion     Admin Date  05/03/2022 Action  New Bag Dose  25 mL/hr Rate  25 mL/hr Route  IntraVENous Administered By  Rudi Sanford RN          bendamustine 165 mg in 0.9% sodium chloride 50 mL, overfill volume 5 mL chemo infusion     Admin Date  05/03/2022 Action  New Bag Dose  165 mg Rate  369.6 mL/hr Route  IntraVENous Administered By  Rudi Sanford RN          dexamethasone (DECADRON) 10 mg/mL injection 10 mg     Admin Date  05/03/2022 Action  Given Dose  10 mg Route  IntraVENous Administered By  Rudi Sanford RN                Patient tolerated treatment well. At the end of infusion, PIV still have blood return and checked with Ciara Rodríguez RN. PIV flushed and removed per protocol. Patient discharged from [place] [address] ambulatory in no distress at 1150. Patient aware of next appointment. Future Appointments   Date Time Provider Yvonne Stone   5/6/2022  9:00 AM [facility] DEXA 1 [facility]   5/31/2022  7:30 AM SS INF4 CH2 >7H [facility] 5/31/2022  8:30 AM Karine Camarena MD [facility] AMB   6/1/2022 10:[facility]   6/27/2022  7:30 [facility]   6/28/2022 10:00 AM [facility]

## 2022-05-06 ENCOUNTER — HOSPITAL ENCOUNTER (OUTPATIENT)
Dept: MAMMOGRAPHY | Age: 52
Discharge: HOME | End: 2022-05-06
Attending: NURSE PRACTITIONER
Payer: COMMERCIAL

## 2022-05-06 ENCOUNTER — TELEPHONE (OUTPATIENT)
Dept: ONCOLOGY | Age: 52
End: 2022-05-06

## 2022-05-06 DIAGNOSIS — S22.42XD CLOSED FRACTURE OF MULTIPLE RIBS OF LEFT SIDE WITH ROUTINE HEALING, SUBSEQUENT ENCOUNTER: ICD-10-CM

## 2022-05-06 PROCEDURE — 77080 DXA BONE DENSITY AXIAL: CPT

## 2022-05-06 NOTE — TELEPHONE ENCOUNTER
05/06/22 3:12 PM Called patient and advised her DEXA shows osteoporosis. Advised she needs to start on calcium and vitamin D3 supplements. Advised she schedule follow up with PCP to discuss bisphosphonates. She verbalized understanding.

## 2022-05-09 NOTE — PROGRESS NOTES
[address] [facility] Oncology at [facility]  [phone]    Hematology / Oncology Established Visit    Reason for Visit:   Mono Christiansen is a 47 y.o. female who is seen for follow up of lymphoma. PCP is Dr. Juanito Andrews. Hematology Oncology Treatment History:     Diagnosis: Follicular lymphoma    Stage: III; FLIPI2 low risk    Pathology:   -1/20/20 Right inguinal LN excision: Follicular lymphoma, grade 1-2, follicular pattern, NH10 positive. Comment:   The LN specimen demonstrates increased follicles, lacking normal germinal centers. IHC markers are performed with good controls. Tumor cells are positive for CD20 and BCL2. CD3 marks background T cells. Cyclin D1 is negative in the lymphoid population. CD21 highlights intact dendritic meshworks. Flow cytometry shows positive expression of CD10 and kappa LC restriction. Ki-67 is pending. Focally there are a few follicles with greater than fifteen centroblasts per HPF; however the majority of follicles have less than 15 centroblasts/HPF, consistent with grade 1-2. Diffuse areas are not seen.    -2/5/20 Bone marrow biopsy: Normocellular marrow with multilineage hematopoiesis and <1% involvement by a CD10 positive B-cell lymphoproliferative disorder. Negative for immunophenotypic or morphologic evidence of dysplasia no increase in blasts. Comment   Flow cytometric analysis reveals a small population of CD10 positive clonal B cells compatible with involvement by the patient's recently diagnosed follicular lymphoma. FISH studies are pending and will be reported. Prior Treatment: None    Current Treatment: Bendamustine-Rituximab x 6 cycles, started 1/2022    History of Present Illness:   Mono Christiansen is a 47 y.o. female who comes in for evaluation of Follicular lymphoma. She states she has had vague symptoms for several years as follows. Pt reports h/o pruritus which started 6 yrs ago, which finally subsided with Zyrtec.  She saw a hematologist at that time, who told her symptoms might surface as a blood disorder in the future. In 2016, pt went to see her Gyn (Dr. Bart Blair) for abdominal cramping. Workup normal at time time. In summer of 2019, pt developed fatigue as well as same of the chronic pain in abdominal area. In fall of 2019, she developed memory issues with work. She went to see her Psychiatrist due to these symptoms and switched her from generic Wellbutrin to brand name Wellbutrin. No improvement after 3-4 weeks. She was okay with sleeping during that time, but continued to have severe fatigue out of character for her. She went to see her PCP and described a right inguinal LN enlargement. She went to see a surgeon, Dr. Andrei Johnston. Ultrasound of that region did demonstrate an enlarged LN. FNA was inconclusive. Core needle biopsy was suspicious for B cell lymphoma. Excisional LN biopsy 3/56/67 revealed follicular lymphoma. CT of chest/abd/pelvis was notable for retroperitoneal, pelvic and portacaval LAD. Pt also reports left hip pain, left foot tingling. No fevers, chills, weight loss. She does have occasional sweats more notable a few months ago, but not drenching or nightly. Patient underwent staging bone marrow biopsy and PET scan. Previously saw [facility] for a 2nd opinion. She was told her GI symptoms are not related to lymphoma. Interval History:  Patient comes in for C6 of BR. She states she feels very tired, exhausted at times. Sleeping well. No SOB or CP, but is lightheaded at times. Had a cough which lingered for 3 weeks and now improved. PMHx: Chronic pain, Depression, Fibrocystic breast  PSurgHx: None aside from bilat breast biopsy and wisdom teeth extraction  SHx: Quit smoking 1/2000. Drinks 5 glasses of wine per week. FHx: Mother had breast cancer age 61. Father had CVA. Review of Systems: A complete review of systems was obtained, negative except as described above.     Physical Exam:     Visit Vitals  /63   Pulse 77   Temp 97.4 °F (36.3 °C)   Ht 5' 7\" (1.702 m)   Wt 153 lb (69.4 kg)   SpO2 96%   BMI 23.96 kg/m²     ECOG PS: 0  General: no distress  Eyes: anicteric sclerae  HENT: oropharynx clear  Neck: supple  Lymphatic: no cervical, axillary adenopathy; prior R inguinal LN and L supraclavicular LN no longer evident  Respiratory: normal respiratory effort  CV: no peripheral edema  GI: soft, nontender, nondistended, no masses  Skin: no rashes; no ecchymoses; no petechiae      Results:     Lab Results   Component Value Date/Time    WBC 3.1 (L) 05/31/2022 07:48 AM    HGB 12.7 05/31/2022 07:48 AM    HCT 36.2 05/31/2022 07:48 AM    PLATELET 893 57/33/0181 07:48 AM    MCV 96.3 05/31/2022 07:48 AM    ABS. NEUTROPHILS PENDING 05/31/2022 07:48 AM     Lab Results   Component Value Date/Time    Sodium 138 05/02/2022 07:43 AM    Potassium 4.3 05/02/2022 07:43 AM    Chloride 106 05/02/2022 07:43 AM    CO2 26 05/02/2022 07:43 AM    Glucose 95 05/02/2022 07:43 AM    BUN 15 05/02/2022 07:43 AM    Creatinine 0.72 05/02/2022 07:43 AM    GFR est AA >60 05/02/2022 07:43 AM    GFR est non-AA >60 05/02/2022 07:43 AM    Calcium 8.7 05/02/2022 07:43 AM    Glucose (POC) 87 03/29/2022 08:07 AM     Lab Results   Component Value Date/Time    Bilirubin, total 0.3 05/02/2022 07:43 AM    ALT (SGPT) 45 05/02/2022 07:43 AM    Alk.  phosphatase 103 05/02/2022 07:43 AM    Protein, total 6.9 05/02/2022 07:43 AM    Albumin 4.0 05/02/2022 07:43 AM    Globulin 2.9 05/02/2022 07:43 AM     No results found for: IRON, FE, TIBC, IBCT, PSAT, FERR    No results found for: B12LT, FOL, RBCF  Lab Results   Component Value Date/Time    TSH 1.830 09/25/2019 12:45 PM     Lab Results   Component Value Date/Time    Hepatitis B surface Ag 0.40 01/06/2022 09:13 AM    Hepatitis B surface Ab <3.10 01/06/2022 09:13 AM    Hep B Core Ab, total Negative 01/06/2022 09:13 AM     12/2/21 LD: 170    Imaging:     Chest CT  1/24/20:  FINDINGS:  The normal-sized axillary lymph nodes are unchanged. THYROID: No nodule. MEDIASTINUM: No mass or lymphadenopathy. MARCE: No mass or lymphadenopathy. THORACIC AORTA: No aneurysm. MAIN PULMONARY ARTERY: Normal in caliber. TRACHEA/BRONCHI: Patent. ESOPHAGUS: No wall thickening or dilatation. HEART: Normal in size. PLEURA: No effusion or pneumothorax. LUNGS: Laterally in the left lower lobe on image 53 is a 2 to 3 mm pulmonary  nodule which is unchanged when compared with the examination of 6/27/2017. This  is a benign nodule and not significant. .  There are normal size lymph nodes adjacent to the descending thoracic aorta and  paraspinal region which have increased in size but remain normal in size. .  Abdomen and pelvis: There has been interval development of adenopathy. In the portacaval space there  is a lymph node that measures 17 mm in short axis. There are retroperitoneal  lymph nodes that are enlarged as well. The largest lymph node in the  retroperitoneum is posterior to the inferior vena cava beginning at the level of  the right renal artery this extends inferiorly. This measures 2.4 x 1.0 x 4.0  cm. There are also enlarged lymph nodes in the left aortic region largest  measuring 12 mm in short axis. There are also lymph nodes posterior to the head of the pancreas and anterior to  the inferior vena cava which are enlarged the largest measures 10 mm in short  axis  There are enlarged lymph nodes in the interaortocaval space. Enlarged lymph  nodes along the right common iliac artery largest measuring approximately 12 mm  in short axis. There are enlarged lymph nodes in the right external iliac chain  largest measuring 9 mm. There is an enlarged lymph node along the right pelvic sidewall measuring 2.5 x  1.3 cm. There are postsurgical changes in the right inguinal region with one  tiny locule of air. There are residual lymph nodes in the right inguinal largest  measuring 10 mm. .  The uterus images normally.  There is a 1 cm cyst in the right ovary. There is no free fluid. Review of the bone windows reveals no destructive lesions. The liver and gallbladder are unremarkable. The spleen is normal in size and  configuration. The pancreas and adrenal glands and kidneys are unremarkable. The aorta and inferior vena cava are unremarkable. IMPRESSION:  1. There is is adenopathy as described. There is adenopathy in the right  inguinal region, along the right pelvic sidewall, along the right iliac chain,  bilateral retroperitoneum, and in the portacaval space as well as posterior to  the pancreas head. 2. In the posterior mediastinum adjacent to the descending thoracic aorta are  lymph nodes that measure less than 1 cm in size but these have increased in the  interval.  3. Please see above text    PET 2/10/20:   FINDINGS:  HEAD/NECK: No apparent foci of abnormal hypermetabolism. Cerebral evaluation is  limited by normal intense activity. CHEST: Mildly hypermetabolic left supraclavicular lymph node, maximum SUV 4.2. ABDOMEN/PELVIS: Mildly hypermetabolic portacaval, aortocaval, and left  para-aortic lymph nodes are noted, maximum SUV 5.5 of an aortocaval lymph node. Hypermetabolic right common iliac, right external iliac, right obturator, and  right inguinal lymph nodes.   SKELETON: No foci of abnormal hypermetabolism in the axial and visualized  appendicular skeleton. IMPRESSION: Mildly hypermetabolic left supraclavicular, abdominal,  retroperitoneal, and pelvic lymph nodes. Ch/abd/pelvis CT 5/7/20:  Lymph nodes in chest: There is no new mediastinal, hilar or axillary lymphadenopathy. Subcentimeter bilateral axillary lymph nodes are unchanged. Subcentimeter  posterior mediastinal lymph node abutting the descending thoracic aorta is  unchanged compared to prior CT dated January 2020. 1.3 cm x 1.1 cm left  supraclavicular lymph node is unchanged compared to prior CT dated January 2020. Lymph nodes in abdom/pelvis:  There is a prominent portacaval lymph node measuring approximately  1.6 cm x 2.3 cm, unchanged compared to prior CT dated January 2020. There are multiple prominent and mildly enlarged retroperitoneal lymph nodes, unchanged  compared to prior CT dated January 2020. For example, there is a left  para-aortic retroperitoneal lymph node measures 1.2 cm x 1.6 cm, unchanged  compared to prior CT dated January 2020. As an additional example, there is a 9  mm x 1.3 cm aortocaval lymph node, unchanged compared to prior CT dated January 2020. Right iliac lymph nodes are unchanged compared to prior CT dated January 2020. For example, the largest right iliac lymph node measures 1.1 cm x 1.4 cm,  unchanged compared to prior CT dated January 2020. Right internal obturator  lymph node is unchanged compared to prior CT dated January 2020) measuring  approximately 2.8 cm x 1.4 cm. Prominent right inguinal lymph nodes are not  significantly changed in size. For example, there is a 1 cm x 1.4 cm right  inguinal lymph node, unchanged compared to prior CT dated January 2020. IMPRESSION: No interval change. Discussed with radiology - the right RP LN near R renal artery is unchanged. The 4cm dimension is craniocaudal.    11/9/20 CT c/a/p:  FINDINGS:   LYMPH NODES: There is a prominent portacaval lymph node measuring approximately  1.8 x 2.0 cm, not significantly changed from prior measurements of 1.6 cm x 2.3  cm. There are  multiple prominent and mildly enlarged retroperitoneal lymph nodes, not  significantly changed from prior. For example, there is a left para-aortic  retroperitoneal lymph node that measures 1.1 x 1.7 cm, not significantly changed  from prior measurements of 1.2 x 1.6 and a 10 x 14 mm aortocaval lymph node, not  significantly changed from prior measurements of 9 x 13 mm. Right iliac lymph  nodes are little changed, though the largest has slightly increased in size  measuring 1.3 x 1.6 cm, previously 1.1 x 1.4 cm.  Right internal obturator is not  significantly changed measuring 1.1 x 2.7 cm, previously 1.3 x 3.0 cm. Prominent  right inguinal lymph nodes are not significantly changed in size measuring 1.0 x  1.4 cm. IMPRESSION: No significant interval change apart from a single right common  iliac pelvic lymph nodes which shows slight increase in size by measurement from  1.1 x 1.4 cm to 1.3 x 1.6 cm.    11/18/21 CT ch/abd/pelvis:  LYMPH NODES: Extensive increased retroperitoneal and portacaval adenopathy. 3-67 aortocaval node 25 x 36 mm previously 18 x 9 mm.  3-71 left retroperitoneal node 31 x 26 mm on the previous exam 17 x 11 mm. Right lower quadrant mesenteric adenopathy 3-78 25 x 30 mm previously 14 x 10  mm. VASCULATURE: No aneurysm or dissection. REPRODUCTIVE ORGANS: Uterus and ovaries are unremarkable to  URINARY BLADDER: No mass or calculus. BONES: No destructive bone lesion. ABDOMINAL WALL: No mass or hernia. ADDITIONAL COMMENTS: N/A  IMPRESSION  Imaging findings are consistent with interval progression of disease. Extensive increased mesenteric and retroperitoneal lymphadenopathy with index  lesion measurements as described above. 12/10/21 PET:  FINDINGS:  HEAD/NECK: No apparent foci of abnormal hypermetabolism. Cerebral evaluation is  limited by normal intense activity. CHEST: Left supraclavicular adenopathy SUV 7.5, previous 4.2. New left internal  mammary SUV 3.3. Right diaphragmatic node with SUV 5.  ABDOMEN/PELVIS: Extensive new hypermetabolic and enlarged nodes: upper abdomen  (SUV 9), mesentery, retrocrural, retroperitoneal, right pelvic (SUV 6) and right  groin (SUV 4.5). A previously measured left aortic node is SUV 6.4, previous  5.5. No splenic activity. SKELETON: No foci of abnormal hypermetabolism in the axial and visualized  appendicular skeleton. IMPRESSION  New/progressed multi level bill disease (Deauville 5).      Each FDG-avid (or previously FDG avid) lesion is rated independently. Reference values:  Mediastinal blood pool: 1.9 SUV  Liver (background): 2.2 SUV    2/17/22 CXR  FINDINGS: The cardiac silhouette is normal in size. There is hyperaeration of  the lungs. There is no evidence of active lung disease. There is evidence of  mild, mid thoracic scoliosis convex to the right. IMPRESSION  Evidence of pulmonary hyperaeration. No evidence of active lung disease. 3/29/22 PET:   FINDINGS:  HEAD/NECK: Maxillary activity is again noted and likely related to underlying  dental disease. Cerebral evaluation is limited by normal intense activity. CHEST: No foci of abnormal hypermetabolism. Resolved left supraclavicular, and  LC, mediastinal, and right cardiophrenic angle lymph node activity. ABDOMEN/PELVIS: No foci of abnormal hypermetabolism. Resolved abdominal,  retrocrural, retroperitoneal, pelvic, and inguinal lymph node activity. SKELETON: Mildly hypermetabolic healing left anterior rib fractures are noted. IMPRESSION  Resolution of all previously described areas of lymph node activity  (all previously described lesions now Deauville 1). Increased tracer activity  corresponds to healing left anterior rib fractures. Maxillary activity likely  related to dental disease. Assessment & Plan:   Anika Keenan is a 47 y.o. female with Depression comes in for evaluation and management of Follicular lymphoma. 1. Follicular lymphoma / Fatigue:   Stage III (based on L supraclav and abd/pelvis LAD). Normal LDH and no cytopenias or B symptoms present. Discussed that this is an indolent lymphoma which does not necessarily recommend treatment at time of diagnosis. Patients are observed with H&P and labs every 3-6 months. Treatment is for those who develop B symptoms, cytopenias or bulky disease (LN >7cm). At this time, I am unclear whether patient's symptoms are related to the disease.  Furthermore, there are no \"bulky\" LNs > 7cm, but there is a 4cm LN in the right retroperitoneal LN region which if it increased significantly in size could risk of compression of renal vasculature. Bone marrow biopsy shows < 1% monoclonal CD10 positive B cell population (seen on flow cytometry), which is considered negative for BM involvement. PET 2/2020 showed only mildly hypermetabolic uptake with max SUV of 5.5. Previously discussed that treatment options for Stage III, grade 1/2 FL include observation vs chemoimmunotherapy with BR regimen vs single agent Rituxan. CT 11/2021 and 12/2021 PET imaging showed interval progression of mesenteric and retroperitoneal LAD. Pt also with worsening fatigue. At this time, I recommend treatment with BR regimen given local GI symptoms related to mesenteric LAD and fatigue. We discussed the risks and benefits of R-Bendamustine chemotherapy. The potential side effects include, but are not limited to: nausea, vomiting, diarrhea, constipation, flu-like symptoms, infusion reaction, allergic reaction, flushing, taste changes, increased risk of infection, anemia, fatigue, alopecia, mucositis, myelosuppression, infertility and rarely, death. The patient has consented to begin therapy. Supportive medications include: ondansetron, prochlorperazine. 3/29/22 mid-treatment PET showed resolution of all previously described areas of lymph node activity. -- Proceed with C6 of BR regimen today  -- Advised Evusheld in 2 weeks  -- Repeat PET in 4 weeks - call with results. -- Return in 6 weeks review labs, MD visit. 2. Depression / ADHD / Anxiety: On Buproprion, Fluoxetine, Vyvanse. 3. Irritable bowel syndrome. Currently improved. Advised daily miralax to prevent constipation, but not using currently. Discussed adding more fiber to diet, at least 25 grams daily. 4. Vaccine counseling:  Had Covid booster and flu shot 12/14/21. -- Evusheld in 2 weeks    5. Fatigue:   Likely related to treatment. Again encouraged light aerobic exercise daily.      6. Osteoporosis:  Seen on recent DEXA. Advised starting on calcium/vitamin D supplementation. Follow up with PCP to discuss bisphosphonates. Emotional well being: Pt is coping well with his/her disease and has excellent support. I appreciate the opportunity to participate in Ms. Destini Diop care. I personally provided this service today.     Signed By: Qing Carroll MD     May 31, 2022

## 2022-05-20 RX ORDER — SODIUM CHLORIDE 9 MG/ML
10 INJECTION INTRAMUSCULAR; INTRAVENOUS; SUBCUTANEOUS AS NEEDED
Status: CANCELLED | OUTPATIENT
Start: 2022-06-01

## 2022-05-20 RX ORDER — DIPHENHYDRAMINE HYDROCHLORIDE 50 MG/ML
50 INJECTION, SOLUTION INTRAMUSCULAR; INTRAVENOUS AS NEEDED
Status: CANCELLED
Start: 2022-06-01

## 2022-05-20 RX ORDER — DIPHENHYDRAMINE HYDROCHLORIDE 50 MG/ML
50 INJECTION, SOLUTION INTRAMUSCULAR; INTRAVENOUS AS NEEDED
Status: CANCELLED
Start: 2022-05-31

## 2022-05-20 RX ORDER — DIPHENHYDRAMINE HYDROCHLORIDE 50 MG/ML
25 INJECTION, SOLUTION INTRAMUSCULAR; INTRAVENOUS AS NEEDED
Status: CANCELLED
Start: 2022-05-31

## 2022-05-20 RX ORDER — ONDANSETRON 2 MG/ML
8 INJECTION INTRAMUSCULAR; INTRAVENOUS AS NEEDED
Status: CANCELLED | OUTPATIENT
Start: 2022-05-31

## 2022-05-20 RX ORDER — ACETAMINOPHEN 325 MG/1
650 TABLET ORAL AS NEEDED
Status: CANCELLED
Start: 2022-05-31

## 2022-05-20 RX ORDER — DIPHENHYDRAMINE HYDROCHLORIDE 50 MG/ML
25 INJECTION, SOLUTION INTRAMUSCULAR; INTRAVENOUS AS NEEDED
Status: CANCELLED
Start: 2022-06-01

## 2022-05-20 RX ORDER — ALBUTEROL SULFATE 0.83 MG/ML
2.5 SOLUTION RESPIRATORY (INHALATION) AS NEEDED
Status: CANCELLED
Start: 2022-06-01

## 2022-05-20 RX ORDER — HYDROCORTISONE SODIUM SUCCINATE 100 MG/2ML
100 INJECTION, POWDER, FOR SOLUTION INTRAMUSCULAR; INTRAVENOUS AS NEEDED
Status: CANCELLED | OUTPATIENT
Start: 2022-06-01

## 2022-05-20 RX ORDER — SODIUM CHLORIDE 0.9 % (FLUSH) 0.9 %
10 SYRINGE (ML) INJECTION AS NEEDED
Status: CANCELLED | OUTPATIENT
Start: 2022-06-01

## 2022-05-20 RX ORDER — HEPARIN 100 UNIT/ML
300-500 SYRINGE INTRAVENOUS AS NEEDED
Status: CANCELLED
Start: 2022-06-01

## 2022-05-20 RX ORDER — DEXAMETHASONE SODIUM PHOSPHATE 100 MG/10ML
10 INJECTION INTRAMUSCULAR; INTRAVENOUS ONCE
Status: CANCELLED | OUTPATIENT
Start: 2022-06-01 | End: 2022-06-01

## 2022-05-20 RX ORDER — SODIUM CHLORIDE 0.9 % (FLUSH) 0.9 %
10 SYRINGE (ML) INJECTION AS NEEDED
Status: CANCELLED | OUTPATIENT
Start: 2022-05-31

## 2022-05-20 RX ORDER — ONDANSETRON 2 MG/ML
8 INJECTION INTRAMUSCULAR; INTRAVENOUS AS NEEDED
Status: CANCELLED | OUTPATIENT
Start: 2022-06-01

## 2022-05-20 RX ORDER — HEPARIN 100 UNIT/ML
300-500 SYRINGE INTRAVENOUS AS NEEDED
Status: CANCELLED
Start: 2022-05-31

## 2022-05-20 RX ORDER — SODIUM CHLORIDE 9 MG/ML
10 INJECTION INTRAMUSCULAR; INTRAVENOUS; SUBCUTANEOUS AS NEEDED
Status: CANCELLED | OUTPATIENT
Start: 2022-05-31

## 2022-05-20 RX ORDER — SODIUM CHLORIDE 9 MG/ML
25 INJECTION, SOLUTION INTRAVENOUS CONTINUOUS
Status: CANCELLED | OUTPATIENT
Start: 2022-06-01

## 2022-05-20 RX ORDER — HYDROCORTISONE SODIUM SUCCINATE 100 MG/2ML
100 INJECTION, POWDER, FOR SOLUTION INTRAMUSCULAR; INTRAVENOUS AS NEEDED
Status: CANCELLED | OUTPATIENT
Start: 2022-05-31

## 2022-05-20 RX ORDER — ACETAMINOPHEN 325 MG/1
650 TABLET ORAL AS NEEDED
Status: CANCELLED
Start: 2022-06-01

## 2022-05-20 RX ORDER — EPINEPHRINE 1 MG/ML
0.3 INJECTION, SOLUTION, CONCENTRATE INTRAVENOUS AS NEEDED
Status: CANCELLED | OUTPATIENT
Start: 2022-06-01

## 2022-05-20 RX ORDER — EPINEPHRINE 1 MG/ML
0.3 INJECTION, SOLUTION, CONCENTRATE INTRAVENOUS AS NEEDED
Status: CANCELLED | OUTPATIENT
Start: 2022-05-31

## 2022-05-20 RX ORDER — ALBUTEROL SULFATE 0.83 MG/ML
2.5 SOLUTION RESPIRATORY (INHALATION) AS NEEDED
Status: CANCELLED
Start: 2022-05-31

## 2022-05-31 ENCOUNTER — OFFICE VISIT (OUTPATIENT)
Dept: ONCOLOGY | Age: 52
End: 2022-05-31
Payer: COMMERCIAL

## 2022-05-31 ENCOUNTER — HOSPITAL ENCOUNTER (OUTPATIENT)
Dept: INFUSION THERAPY | Age: 52
Discharge: HOME | End: 2022-05-31
Payer: COMMERCIAL

## 2022-05-31 VITALS
RESPIRATION RATE: 18 BRPM | SYSTOLIC BLOOD PRESSURE: 111 MMHG | HEART RATE: 81 BPM | HEIGHT: 67 IN | BODY MASS INDEX: 24.01 KG/M2 | TEMPERATURE: 97.8 F | OXYGEN SATURATION: 95 % | WEIGHT: 153 LBS | DIASTOLIC BLOOD PRESSURE: 69 MMHG

## 2022-05-31 VITALS
OXYGEN SATURATION: 96 % | SYSTOLIC BLOOD PRESSURE: 107 MMHG | WEIGHT: 153 LBS | HEIGHT: 67 IN | TEMPERATURE: 97.4 F | BODY MASS INDEX: 24.01 KG/M2 | HEART RATE: 77 BPM | DIASTOLIC BLOOD PRESSURE: 63 MMHG

## 2022-05-31 DIAGNOSIS — C82.13 FOLLICULAR LYMPHOMA GRADE II OF INTRA-ABDOMINAL LYMPH NODES (HCC): Primary | ICD-10-CM

## 2022-05-31 DIAGNOSIS — K58.2 IRRITABLE BOWEL SYNDROME WITH BOTH CONSTIPATION AND DIARRHEA: ICD-10-CM

## 2022-05-31 DIAGNOSIS — F41.8 ANXIETY ABOUT HEALTH: ICD-10-CM

## 2022-05-31 DIAGNOSIS — R53.82 CHRONIC FATIGUE: ICD-10-CM

## 2022-05-31 DIAGNOSIS — Z51.11 CHEMOTHERAPY MANAGEMENT, ENCOUNTER FOR: ICD-10-CM

## 2022-05-31 LAB
ALBUMIN SERPL-MCNC: 3.7 G/DL (ref 3.5–5)
ALBUMIN/GLOB SERPL: 1.2 {RATIO} (ref 1.1–2.2)
ALP SERPL-CCNC: 100 U/L (ref 45–117)
ALT SERPL-CCNC: 27 U/L (ref 12–78)
ANION GAP SERPL CALC-SCNC: 5 MMOL/L (ref 5–15)
AST SERPL-CCNC: 23 U/L (ref 15–37)
BASOPHILS # BLD: 0 K/UL (ref 0–0.1)
BASOPHILS NFR BLD: 1 % (ref 0–1)
BILIRUB SERPL-MCNC: 0.9 MG/DL (ref 0.2–1)
BUN SERPL-MCNC: 10 MG/DL (ref 6–20)
BUN/CREAT SERPL: 13 (ref 12–20)
CALCIUM SERPL-MCNC: 8.7 MG/DL (ref 8.5–10.1)
CHLORIDE SERPL-SCNC: 107 MMOL/L (ref 97–108)
CO2 SERPL-SCNC: 27 MMOL/L (ref 21–32)
CREAT SERPL-MCNC: 0.77 MG/DL (ref 0.55–1.02)
DIFFERENTIAL METHOD BLD: ABNORMAL
EOSINOPHIL # BLD: 0.2 K/UL (ref 0–0.4)
EOSINOPHIL NFR BLD: 7 % (ref 0–7)
ERYTHROCYTE [DISTWIDTH] IN BLOOD BY AUTOMATED COUNT: 11.8 % (ref 11.5–14.5)
GLOBULIN SER CALC-MCNC: 3.1 G/DL (ref 2–4)
GLUCOSE SERPL-MCNC: 112 MG/DL (ref 65–100)
HCT VFR BLD AUTO: 36.2 % (ref 35–47)
HGB BLD-MCNC: 12.7 G/DL (ref 11.5–16)
IMM GRANULOCYTES # BLD AUTO: 0 K/UL (ref 0–0.04)
IMM GRANULOCYTES NFR BLD AUTO: 0 % (ref 0–0.5)
LYMPHOCYTES # BLD: 0.3 K/UL (ref 0.8–3.5)
LYMPHOCYTES NFR BLD: 9 % (ref 12–49)
MCH RBC QN AUTO: 33.8 PG (ref 26–34)
MCHC RBC AUTO-ENTMCNC: 35.1 G/DL (ref 30–36.5)
MCV RBC AUTO: 96.3 FL (ref 80–99)
MONOCYTES # BLD: 0.6 K/UL (ref 0–1)
MONOCYTES NFR BLD: 18 % (ref 5–13)
NEUTS SEG # BLD: 2 K/UL (ref 1.8–8)
NEUTS SEG NFR BLD: 65 % (ref 32–75)
NRBC # BLD: 0 K/UL (ref 0–0.01)
NRBC BLD-RTO: 0 PER 100 WBC
PLATELET # BLD AUTO: 195 K/UL (ref 150–400)
PMV BLD AUTO: 8.9 FL (ref 8.9–12.9)
POTASSIUM SERPL-SCNC: 3.6 MMOL/L (ref 3.5–5.1)
PROT SERPL-MCNC: 6.8 G/DL (ref 6.4–8.2)
RBC # BLD AUTO: 3.76 M/UL (ref 3.8–5.2)
RBC MORPH BLD: ABNORMAL
SODIUM SERPL-SCNC: 139 MMOL/L (ref 136–145)
WBC # BLD AUTO: 3.1 K/UL (ref 3.6–11)

## 2022-05-31 PROCEDURE — 96411 CHEMO IV PUSH ADDL DRUG: CPT

## 2022-05-31 PROCEDURE — 74011250637 HC RX REV CODE- 250/637: Performed by: INTERNAL MEDICINE

## 2022-05-31 PROCEDURE — 85025 COMPLETE CBC W/AUTO DIFF WBC: CPT

## 2022-05-31 PROCEDURE — 74011250636 HC RX REV CODE- 250/636: Performed by: INTERNAL MEDICINE

## 2022-05-31 PROCEDURE — 96415 CHEMO IV INFUSION ADDL HR: CPT

## 2022-05-31 PROCEDURE — 99215 OFFICE O/P EST HI 40 MIN: CPT | Performed by: INTERNAL MEDICINE

## 2022-05-31 PROCEDURE — 36415 COLL VENOUS BLD VENIPUNCTURE: CPT

## 2022-05-31 PROCEDURE — 74011000258 HC RX REV CODE- 258: Performed by: INTERNAL MEDICINE

## 2022-05-31 PROCEDURE — 96375 TX/PRO/DX INJ NEW DRUG ADDON: CPT

## 2022-05-31 PROCEDURE — 80053 COMPREHEN METABOLIC PANEL: CPT

## 2022-05-31 PROCEDURE — 96413 CHEMO IV INFUSION 1 HR: CPT

## 2022-05-31 RX ORDER — DEXAMETHASONE SODIUM PHOSPHATE 100 MG/10ML
10 INJECTION INTRAMUSCULAR; INTRAVENOUS ONCE
Status: COMPLETED | OUTPATIENT
Start: 2022-05-31 | End: 2022-05-31

## 2022-05-31 RX ORDER — PALONOSETRON 0.05 MG/ML
0.25 INJECTION, SOLUTION INTRAVENOUS ONCE
Status: COMPLETED | OUTPATIENT
Start: 2022-05-31 | End: 2022-05-31

## 2022-05-31 RX ORDER — ACETAMINOPHEN 325 MG/1
650 TABLET ORAL ONCE
Status: COMPLETED | OUTPATIENT
Start: 2022-05-31 | End: 2022-05-31

## 2022-05-31 RX ORDER — SODIUM CHLORIDE 9 MG/ML
25 INJECTION, SOLUTION INTRAVENOUS CONTINUOUS
Status: DISPENSED | OUTPATIENT
Start: 2022-05-31 | End: 2022-05-31

## 2022-05-31 RX ORDER — DIPHENHYDRAMINE HYDROCHLORIDE 50 MG/ML
50 INJECTION, SOLUTION INTRAMUSCULAR; INTRAVENOUS ONCE
Status: COMPLETED | OUTPATIENT
Start: 2022-05-31 | End: 2022-05-31

## 2022-05-31 RX ADMIN — BENDAMUSTINE HYDROCHLORIDE 165 MG: 25 INJECTION, SOLUTION INTRAVENOUS at 11:38

## 2022-05-31 RX ADMIN — SODIUM CHLORIDE 25 ML/HR: 9 INJECTION, SOLUTION INTRAVENOUS at 09:11

## 2022-05-31 RX ADMIN — PALONOSETRON 0.25 MG: 0.25 INJECTION, SOLUTION INTRAVENOUS at 09:13

## 2022-05-31 RX ADMIN — SODIUM CHLORIDE 683 MG: 9 INJECTION, SOLUTION INTRAVENOUS at 09:55

## 2022-05-31 RX ADMIN — ACETAMINOPHEN 650 MG: 325 TABLET ORAL at 09:10

## 2022-05-31 RX ADMIN — DEXAMETHASONE SODIUM PHOSPHATE 10 MG: 10 INJECTION INTRAMUSCULAR; INTRAVENOUS at 09:16

## 2022-05-31 RX ADMIN — DIPHENHYDRAMINE HYDROCHLORIDE 50 MG: 50 INJECTION INTRAMUSCULAR; INTRAVENOUS at 09:18

## 2022-05-31 NOTE — PROGRESS NOTES
Shirley Cydney is a 47 y.o. female here for follow up of Follicular lymphoma. Patient with no complaints of pain at this time.

## 2022-06-01 ENCOUNTER — TELEPHONE (OUTPATIENT)
Dept: ONCOLOGY | Age: 52
End: 2022-06-01

## 2022-06-01 ENCOUNTER — HOSPITAL ENCOUNTER (OUTPATIENT)
Dept: INFUSION THERAPY | Age: 52
Discharge: HOME | End: 2022-06-01
Payer: COMMERCIAL

## 2022-06-01 VITALS
SYSTOLIC BLOOD PRESSURE: 106 MMHG | RESPIRATION RATE: 16 BRPM | HEART RATE: 70 BPM | TEMPERATURE: 97.9 F | OXYGEN SATURATION: 98 % | DIASTOLIC BLOOD PRESSURE: 56 MMHG

## 2022-06-01 DIAGNOSIS — C82.13 FOLLICULAR LYMPHOMA GRADE II OF INTRA-ABDOMINAL LYMPH NODES (HCC): Primary | ICD-10-CM

## 2022-06-01 PROCEDURE — 74011000258 HC RX REV CODE- 258: Performed by: INTERNAL MEDICINE

## 2022-06-01 PROCEDURE — 74011250636 HC RX REV CODE- 250/636: Performed by: INTERNAL MEDICINE

## 2022-06-01 PROCEDURE — 96375 TX/PRO/DX INJ NEW DRUG ADDON: CPT

## 2022-06-01 PROCEDURE — 96409 CHEMO IV PUSH SNGL DRUG: CPT

## 2022-06-01 RX ORDER — SODIUM CHLORIDE 0.9 % (FLUSH) 0.9 %
10 SYRINGE (ML) INJECTION AS NEEDED
Status: DISPENSED | OUTPATIENT
Start: 2022-06-01 | End: 2022-06-01

## 2022-06-01 RX ORDER — HEPARIN 100 UNIT/ML
300-500 SYRINGE INTRAVENOUS AS NEEDED
Status: ACTIVE | OUTPATIENT
Start: 2022-06-01 | End: 2022-06-01

## 2022-06-01 RX ORDER — SODIUM CHLORIDE 9 MG/ML
10 INJECTION INTRAMUSCULAR; INTRAVENOUS; SUBCUTANEOUS AS NEEDED
Status: ACTIVE | OUTPATIENT
Start: 2022-06-01 | End: 2022-06-01

## 2022-06-01 RX ORDER — SODIUM CHLORIDE 9 MG/ML
25 INJECTION, SOLUTION INTRAVENOUS CONTINUOUS
Status: DISPENSED | OUTPATIENT
Start: 2022-06-01 | End: 2022-06-01

## 2022-06-01 RX ORDER — DEXAMETHASONE SODIUM PHOSPHATE 100 MG/10ML
10 INJECTION INTRAMUSCULAR; INTRAVENOUS ONCE
Status: COMPLETED | OUTPATIENT
Start: 2022-06-01 | End: 2022-06-01

## 2022-06-01 RX ADMIN — BENDAMUSTINE HYDROCHLORIDE 165 MG: 25 INJECTION, SOLUTION INTRAVENOUS at 11:05

## 2022-06-01 RX ADMIN — SODIUM CHLORIDE 25 ML/HR: 9 INJECTION, SOLUTION INTRAVENOUS at 10:06

## 2022-06-01 RX ADMIN — DEXAMETHASONE SODIUM PHOSPHATE 10 MG: 10 INJECTION INTRAMUSCULAR; INTRAVENOUS at 10:18

## 2022-06-01 NOTE — PROGRESS NOTES
[facility] VISIT NOTE  Date: 2022    Name: Vivienne Fontenot    MRN: [id number]         : 1970    Ms. Man Arrived ambulatory and in no distress for C6D2 of Bendamustine Regimen. Assessment was completed by ANAND Villafuerte, no acute issues at this time, no new complaints voiced. LFA 24G PIV accessed by ANAND Riley without difficulty. No lab order for today. 1. Do you have any symptoms of COVID-19? SOB, coughing, fever, or generally not feeling well NO    2. Have you been exposed to COVID-19 recently? NO    3. Have you had any recent contact with family/friend that has a pending COVID test? NO       Chemotherapy Flowsheet 2022   Cycle C6D2   Date 2022   Drug / Regimen Bendamustine   Pre Meds given   Notes given           Ms. Man's vitals were reviewed. Patient Vitals for the past 12 hrs:   Temp Pulse Resp BP SpO2   22 1121 97.9 °F (36.6 °C) 70 16 (!) 106/56 98 %   22 1000 97.6 °F (36.4 °C) 81 16 (!) 111/59 96 %         Lab results. N/A      Medications received:  Medications Administered     0.9% sodium chloride infusion     Admin Date  2022 Action  New Bag Dose  25 mL/hr Rate  25 mL/hr Route  IntraVENous Administered By  Sanna Pinto RN          bendamustine 165 mg in 0.9% sodium chloride 50 mL, overfill volume 5 mL chemo infusion     Admin Date  2022 Action  New Bag Dose  165 mg Rate  369.6 mL/hr Route  IntraVENous Administered By  Marshall Cochran RN          dexamethasone (DECADRON) 10 mg/mL injection 10 mg     Admin Date  2022 Action  Given Dose  10 mg Route  IntraVENous Administered By  Jersey Winn RN                 Ms. Kiera Reyes tolerated treatment well and was discharged from [place] [address] in stable condition at 1125. PIV flushed & removed with blood return per protocol. She is to return on  2022 at 0815 for her next appointment.     Zenon Guzman RN  2022    Future Appointments:  Future Appointments   Date Time Provider Yvonne Stone   7/12/2022  8:15 AM Lonny Winter NP [facility] AMB

## 2022-06-02 ENCOUNTER — TELEPHONE (OUTPATIENT)
Dept: ONCOLOGY | Age: 52
End: 2022-06-02

## 2022-06-02 DIAGNOSIS — C82.13 FOLLICULAR LYMPHOMA GRADE II OF INTRA-ABDOMINAL LYMPH NODES (HCC): Primary | ICD-10-CM

## 2022-06-02 RX ORDER — ACETAMINOPHEN 325 MG/1
650 TABLET ORAL AS NEEDED
Status: CANCELLED
Start: 2022-06-29

## 2022-06-02 RX ORDER — ALBUTEROL SULFATE 0.83 MG/ML
2.5 SOLUTION RESPIRATORY (INHALATION) AS NEEDED
Status: CANCELLED
Start: 2022-06-29

## 2022-06-02 RX ORDER — ONDANSETRON 2 MG/ML
8 INJECTION INTRAMUSCULAR; INTRAVENOUS AS NEEDED
Status: CANCELLED | OUTPATIENT
Start: 2022-06-29

## 2022-06-02 RX ORDER — DIPHENHYDRAMINE HYDROCHLORIDE 50 MG/ML
50 INJECTION, SOLUTION INTRAMUSCULAR; INTRAVENOUS AS NEEDED
Status: CANCELLED
Start: 2022-06-29

## 2022-06-02 RX ORDER — TIXAGEVIMAB 150 MG/1.5
300 VIAL (ML) INTRAMUSCULAR ONCE
Status: CANCELLED | OUTPATIENT
Start: 2022-06-29 | End: 2022-06-29

## 2022-06-02 RX ORDER — EPINEPHRINE 1 MG/ML
0.3 INJECTION, SOLUTION, CONCENTRATE INTRAVENOUS AS NEEDED
Status: CANCELLED | OUTPATIENT
Start: 2022-06-29

## 2022-06-02 RX ORDER — DIPHENHYDRAMINE HYDROCHLORIDE 50 MG/ML
25 INJECTION, SOLUTION INTRAMUSCULAR; INTRAVENOUS AS NEEDED
Status: CANCELLED
Start: 2022-06-29

## 2022-06-02 RX ORDER — HYDROCORTISONE SODIUM SUCCINATE 100 MG/2ML
100 INJECTION, POWDER, FOR SOLUTION INTRAMUSCULAR; INTRAVENOUS AS NEEDED
Status: CANCELLED | OUTPATIENT
Start: 2022-06-29

## 2022-06-02 RX ORDER — CILGAVIMAB 150 MG/1.5
300 VIAL (ML) INTRAMUSCULAR ONCE
Status: CANCELLED | OUTPATIENT
Start: 2022-06-29 | End: 2022-06-29

## 2022-06-02 NOTE — TELEPHONE ENCOUNTER
06/02/22 11:01 AM Called pt and discussed Josh. Discussed potential side effects and administration. She consented to Josh. Advised I will place orders and Sharon Bahena will reach out to schedule an appt for 2 weeks. Also, provided # for PET and advised she schedule it in 4 weeks. She verbalized understanding.

## 2022-06-20 ENCOUNTER — HOSPITAL ENCOUNTER (OUTPATIENT)
Dept: INFUSION THERAPY | Age: 52
Discharge: HOME | End: 2022-06-20

## 2022-06-27 ENCOUNTER — APPOINTMENT (OUTPATIENT)
Dept: INFUSION THERAPY | Age: 52
End: 2022-06-27

## 2022-06-28 ENCOUNTER — APPOINTMENT (OUTPATIENT)
Dept: INFUSION THERAPY | Age: 52
End: 2022-06-28

## 2022-06-28 ENCOUNTER — HOSPITAL ENCOUNTER (OUTPATIENT)
Dept: PET IMAGING | Age: 52
Discharge: HOME | End: 2022-06-28
Attending: INTERNAL MEDICINE
Payer: COMMERCIAL

## 2022-06-28 VITALS — BODY MASS INDEX: 24.01 KG/M2 | WEIGHT: 153 LBS | HEIGHT: 67 IN

## 2022-06-28 DIAGNOSIS — C82.13 FOLLICULAR LYMPHOMA GRADE II OF INTRA-ABDOMINAL LYMPH NODES (HCC): ICD-10-CM

## 2022-06-28 LAB
GLUCOSE BLD STRIP.AUTO-MCNC: 104 MG/DL (ref 65–117)
SERVICE CMNT-IMP: NORMAL

## 2022-06-28 PROCEDURE — A9552 F18 FDG: HCPCS

## 2022-06-28 RX ORDER — FLUDEOXYGLUCOSE F-18 200 MCI/ML
10 INJECTION INTRAVENOUS ONCE
Status: COMPLETED | OUTPATIENT
Start: 2022-06-28 | End: 2022-06-28

## 2022-06-28 RX ADMIN — FLUDEOXYGLUCOSE F-18 10 MILLICURIE: 200 INJECTION INTRAVENOUS at 07:21

## 2022-06-29 ENCOUNTER — HOSPITAL ENCOUNTER (OUTPATIENT)
Dept: INFUSION THERAPY | Age: 52
Discharge: HOME | End: 2022-06-29
Attending: NURSE PRACTITIONER
Payer: COMMERCIAL

## 2022-06-29 VITALS
BODY MASS INDEX: 24.23 KG/M2 | TEMPERATURE: 97.9 F | DIASTOLIC BLOOD PRESSURE: 73 MMHG | SYSTOLIC BLOOD PRESSURE: 121 MMHG | OXYGEN SATURATION: 96 % | RESPIRATION RATE: 18 BRPM | WEIGHT: 154.7 LBS | HEART RATE: 103 BPM

## 2022-06-29 DIAGNOSIS — C82.13 FOLLICULAR LYMPHOMA GRADE II OF INTRA-ABDOMINAL LYMPH NODES (HCC): Primary | ICD-10-CM

## 2022-06-29 PROCEDURE — 74011250636 HC RX REV CODE- 250/636: Performed by: NURSE PRACTITIONER

## 2022-06-29 PROCEDURE — 96372 THER/PROPH/DIAG INJ SC/IM: CPT

## 2022-06-29 RX ORDER — TIXAGEVIMAB 150 MG/1.5
300 VIAL (ML) INTRAMUSCULAR ONCE
Status: CANCELLED | OUTPATIENT
Start: 2022-06-29 | End: 2022-06-29

## 2022-06-29 RX ORDER — HYDROCORTISONE SODIUM SUCCINATE 100 MG/2ML
100 INJECTION, POWDER, FOR SOLUTION INTRAMUSCULAR; INTRAVENOUS AS NEEDED
Status: CANCELLED | OUTPATIENT
Start: 2022-06-29

## 2022-06-29 RX ORDER — ALBUTEROL SULFATE 0.83 MG/ML
2.5 SOLUTION RESPIRATORY (INHALATION) AS NEEDED
Status: CANCELLED
Start: 2022-06-29

## 2022-06-29 RX ORDER — CILGAVIMAB 150 MG/1.5
300 VIAL (ML) INTRAMUSCULAR ONCE
Status: COMPLETED | OUTPATIENT
Start: 2022-06-29 | End: 2022-06-29

## 2022-06-29 RX ORDER — EPINEPHRINE 1 MG/ML
0.3 INJECTION, SOLUTION, CONCENTRATE INTRAVENOUS AS NEEDED
Status: CANCELLED | OUTPATIENT
Start: 2022-06-29

## 2022-06-29 RX ORDER — DIPHENHYDRAMINE HYDROCHLORIDE 50 MG/ML
25 INJECTION, SOLUTION INTRAMUSCULAR; INTRAVENOUS AS NEEDED
Status: CANCELLED
Start: 2022-06-29

## 2022-06-29 RX ORDER — ACETAMINOPHEN 325 MG/1
650 TABLET ORAL AS NEEDED
Status: CANCELLED
Start: 2022-06-29

## 2022-06-29 RX ORDER — DIPHENHYDRAMINE HYDROCHLORIDE 50 MG/ML
50 INJECTION, SOLUTION INTRAMUSCULAR; INTRAVENOUS AS NEEDED
Status: CANCELLED
Start: 2022-06-29

## 2022-06-29 RX ORDER — CILGAVIMAB 150 MG/1.5
300 VIAL (ML) INTRAMUSCULAR ONCE
Status: CANCELLED | OUTPATIENT
Start: 2022-06-29 | End: 2022-06-29

## 2022-06-29 RX ORDER — TIXAGEVIMAB 150 MG/1.5
300 VIAL (ML) INTRAMUSCULAR ONCE
Status: COMPLETED | OUTPATIENT
Start: 2022-06-29 | End: 2022-06-29

## 2022-06-29 RX ORDER — ONDANSETRON 2 MG/ML
8 INJECTION INTRAMUSCULAR; INTRAVENOUS AS NEEDED
Status: CANCELLED | OUTPATIENT
Start: 2022-06-29

## 2022-06-29 RX ADMIN — Medication 300 MG: at 14:05

## 2022-06-29 RX ADMIN — Medication 300 MG: at 14:00

## 2022-06-29 NOTE — PROGRESS NOTES
[facility] VISIT NOTE    1350. Pt arrived at [facility] ambulatory and in no distress for Josh. Assessment completed, pt voiced no acute complaints or concerns. Medications received:  Evusheld IM - BGM    Patient stayed for 1 hour observation post injections. Tolerated treatment well, no adverse reaction noted. Patient Vitals for the past 12 hrs:   Temp Pulse Resp BP SpO2   06/29/22 1351 97.9 °F (36.6 °C) (!) 103 18 121/73 96 %     1515  D/C'd from [facility] ambulatory and in no distress.

## 2022-06-30 ENCOUNTER — OFFICE VISIT (OUTPATIENT)
Dept: INTERNAL MEDICINE CLINIC | Age: 52
End: 2022-06-30
Payer: COMMERCIAL

## 2022-06-30 VITALS
WEIGHT: 153.6 LBS | HEIGHT: 67 IN | BODY MASS INDEX: 24.11 KG/M2 | DIASTOLIC BLOOD PRESSURE: 70 MMHG | HEART RATE: 83 BPM | RESPIRATION RATE: 16 BRPM | OXYGEN SATURATION: 97 % | TEMPERATURE: 97.6 F | SYSTOLIC BLOOD PRESSURE: 114 MMHG

## 2022-06-30 DIAGNOSIS — S70.262A TICK BITE OF LEFT HIP, INITIAL ENCOUNTER: ICD-10-CM

## 2022-06-30 DIAGNOSIS — C82.13 FOLLICULAR LYMPHOMA GRADE II OF INTRA-ABDOMINAL LYMPH NODES (HCC): ICD-10-CM
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
## 2022-06-30 DIAGNOSIS — E55.9 VITAMIN D DEFICIENCY: ICD-10-CM

## 2022-06-30 DIAGNOSIS — R82.998 FOAMY URINE: Primary | ICD-10-CM

## 2022-06-30 DIAGNOSIS — M25.50 ARTHRALGIA, UNSPECIFIED JOINT: ICD-10-CM

## 2022-06-30 LAB
25(OH)D3 SERPL-MCNC: 25.4 NG/ML (ref 30–100)
ALBUMIN SERPL-MCNC: 4.4 G/DL (ref 3.5–5)
ALBUMIN/GLOB SERPL: 1.5 {RATIO} (ref 1.1–2.2)
ALP SERPL-CCNC: 106 U/L (ref 45–117)
ALT SERPL-CCNC: 46 U/L (ref 12–78)
ANION GAP SERPL CALC-SCNC: 8 MMOL/L (ref 5–15)
AST SERPL-CCNC: 34 U/L (ref 15–37)
BASOPHILS # BLD: 0.1 K/UL (ref 0–0.1)
BASOPHILS NFR BLD: 2 % (ref 0–1)
BILIRUB SERPL-MCNC: 0.7 MG/DL (ref 0.2–1)
BILIRUB UR QL STRIP: NEGATIVE
BUN SERPL-MCNC: 15 MG/DL (ref 6–20)
BUN/CREAT SERPL: 16 (ref 12–20)
CALCIUM SERPL-MCNC: 10.2 MG/DL (ref 8.5–10.1)
CHLORIDE SERPL-SCNC: 105 MMOL/L (ref 97–108)
CO2 SERPL-SCNC: 27 MMOL/L (ref 21–32)
CREAT SERPL-MCNC: 0.91 MG/DL (ref 0.55–1.02)
DIFFERENTIAL METHOD BLD: ABNORMAL
EOSINOPHIL # BLD: 0.1 K/UL (ref 0–0.4)
EOSINOPHIL NFR BLD: 3 % (ref 0–7)
ERYTHROCYTE [DISTWIDTH] IN BLOOD BY AUTOMATED COUNT: 11.7 % (ref 11.5–14.5)
GLOBULIN SER CALC-MCNC: 2.9 G/DL (ref 2–4)
GLUCOSE SERPL-MCNC: 106 MG/DL (ref 65–100)
GLUCOSE UR-MCNC: NEGATIVE MG/DL
HCT VFR BLD AUTO: 39 % (ref 35–47)
HGB BLD-MCNC: 13.5 G/DL (ref 11.5–16)
IMM GRANULOCYTES # BLD AUTO: 0 K/UL (ref 0–0.04)
IMM GRANULOCYTES NFR BLD AUTO: 1 % (ref 0–0.5)
KETONES P FAST UR STRIP-MCNC: NEGATIVE MG/DL
LYMPHOCYTES # BLD: 0.3 K/UL (ref 0.8–3.5)
LYMPHOCYTES NFR BLD: 8 % (ref 12–49)
MCH RBC QN AUTO: 33.8 PG (ref 26–34)
MCHC RBC AUTO-ENTMCNC: 34.6 G/DL (ref 30–36.5)
MCV RBC AUTO: 97.7 FL (ref 80–99)
MONOCYTES # BLD: 0.6 K/UL (ref 0–1)
MONOCYTES NFR BLD: 19 % (ref 5–13)
NEUTS SEG # BLD: 2.2 K/UL (ref 1.8–8)
NEUTS SEG NFR BLD: 67 % (ref 32–75)
NRBC # BLD: 0 K/UL (ref 0–0.01)
NRBC BLD-RTO: 0 PER 100 WBC
PH UR STRIP: 5.5 [PH] (ref 4.6–8)
PLATELET # BLD AUTO: 290 K/UL (ref 150–400)
PMV BLD AUTO: 9.2 FL (ref 8.9–12.9)
POTASSIUM SERPL-SCNC: 4.2 MMOL/L (ref 3.5–5.1)
PROT SERPL-MCNC: 7.3 G/DL (ref 6.4–8.2)
PROT UR QL STRIP: NEGATIVE
RBC # BLD AUTO: 3.99 M/UL (ref 3.8–5.2)
RBC MORPH BLD: ABNORMAL
SODIUM SERPL-SCNC: 140 MMOL/L (ref 136–145)
SP GR UR STRIP: 1.01 (ref 1–1.03)
TSH SERPL DL<=0.05 MIU/L-ACNC: 1.37 UIU/ML (ref 0.36–3.74)
UA UROBILINOGEN AMB POC: NORMAL (ref 0.2–1)
URINALYSIS CLARITY POC: CLEAR
URINALYSIS COLOR POC: YELLOW
URINE BLOOD POC: NORMAL
URINE LEUKOCYTES POC: NORMAL
URINE NITRITES POC: NEGATIVE
WBC # BLD AUTO: 3.3 K/UL (ref 3.6–11)

## 2022-06-30 PROCEDURE — 81001 URINALYSIS AUTO W/SCOPE: CPT | Performed by: INTERNAL MEDICINE

## 2022-06-30 PROCEDURE — 99214 OFFICE O/P EST MOD 30 MIN: CPT | Performed by: INTERNAL MEDICINE

## 2022-06-30 NOTE — PROGRESS NOTES
Duyen Batista (: 1970) is a 47 y.o. female, established patient, here for evaluation of the following chief complaint(s):  Joint Pain (soreness in the tonsil area, sore joints, night sweats, nausea, loss of appetite, thirst, foamy urine)       ASSESSMENT/PLAN:  Below is the assessment and plan developed based on review of pertinent history, physical exam, labs, studies, and medications. 1. Foamy urine  -     AMB POC URINALYSIS DIP STICK AUTO W/ MICRO  -     CULTURE, URINE; Future  UA abnormal, will send for a culture. 2. Follicular lymphoma grade II of intra-abdominal lymph nodes ([address]  Followed by Dr. Per Devi, most recent PET scan stable, continue with Ritixumab. 3. Tick bite of left hip, initial encounter  I suspect that she is suffering a tick bourne illness, will order labs to further explore. Continue with course of Doxycycline. 4. Arthralgia, unspecified joint  -     CBC WITH AUTOMATED DIFF; Future  -     METABOLIC PANEL, COMPREHENSIVE; Future  -     TSH 3RD GENERATION; Future  -     LYME AB, IGG & IGM BY WB; Future  I suspect that she is suffering a tick bourne illness, will order labs to further explore. Continue with course of Doxycycline. 5. Vitamin D deficiency  -     VITAMIN D, 25 HYDROXY; Future  Presumed stable, will recheck today to ensure that this is not contributing to her reported sx. SUBJECTIVE/OBJECTIVE:  HPI    Tick bite: Pt began experiencing joint pain and stiffness, tonsil pain, nausea, dry mouth, loss of appetite, foamy urine (3-4 weeks), and night sweats ~2-4 weeks after a tick bite. She was rx doxycycline on 22, which has mildly improved her sx. Pt notes pain and pressure in her mouth/tonsils when she touches her lymph nodes or bends down. She denies flonase or histamine use or change in medication that could contribute to dry mouth. Review of Systems   Constitutional: Positive for appetite change. HENT: Positive for sore throat.     Gastrointestinal: Positive for nausea. Endocrine: Positive for heat intolerance. Musculoskeletal: Positive for arthralgias. All other systems reviewed and are negative. Physical Exam  Constitutional:       Appearance: Normal appearance. HENT:      Right Ear: Tympanic membrane and external ear normal.      Left Ear: Tympanic membrane and external ear normal.      Mouth/Throat:      Mouth: Mucous membranes are moist.      Pharynx: Oropharynx is clear. Cardiovascular:      Rate and Rhythm: Normal rate and regular rhythm. Pulses: Normal pulses. Heart sounds: Normal heart sounds. Pulmonary:      Effort: Pulmonary effort is normal.      Breath sounds: Normal breath sounds. Musculoskeletal:         General: Normal range of motion. Skin:     General: Skin is warm and dry. Neurological:      General: No focal deficit present. Mental Status: She is alert and oriented to person, place, and time. Psychiatric:         Mood and Affect: Mood normal.         Behavior: Behavior normal.       On this date 06/30/2022 I have spent 30 minutes reviewing previous notes, test results and face to face with the patient discussing the diagnosis and importance of compliance with the treatment plan as well as documenting on the day of the visit. An electronic signature was used to authenticate this note. Written by Cecy Hooks as dictated by Dr. Giancarlo Vernon.    -- Cecy Hooks

## 2022-07-02 LAB
BACTERIA SPEC CULT: NORMAL
SERVICE CMNT-IMP: NORMAL

## 2022-07-07 NOTE — PROGRESS NOTES
[id number] [facility] at [address] [place] [address]  [phone]    Hematology / Oncology Established Visit    Reason for Visit:   Jay Person is a 47 y.o. female who is seen for follow up of lymphoma. PCP is Dr. Taylor Cano. Hematology Oncology Treatment History:     Diagnosis: Follicular lymphoma    Stage: III; FLIPI2 low risk    Pathology:   -1/20/20 Right inguinal LN excision: Follicular lymphoma, grade 1-2, follicular pattern, OL65 positive. Comment:   The LN specimen demonstrates increased follicles, lacking normal germinal centers. IHC markers are performed with good controls. Tumor cells are positive for CD20 and BCL2. CD3 marks background T cells. Cyclin D1 is negative in the lymphoid population. CD21 highlights intact dendritic meshworks. Flow cytometry shows positive expression of CD10 and kappa LC restriction. Ki-67 is pending. Focally there are a few follicles with greater than fifteen centroblasts per HPF; however the majority of follicles have less than 15 centroblasts/HPF, consistent with grade 1-2. Diffuse areas are not seen.    -2/5/20 Bone marrow biopsy: Normocellular marrow with multilineage hematopoiesis and <1% involvement by a CD10 positive B-cell lymphoproliferative disorder. Negative for immunophenotypic or morphologic evidence of dysplasia no increase in blasts. Comment   Flow cytometric analysis reveals a small population of CD10 positive clonal B cells compatible with involvement by the patient's recently diagnosed follicular lymphoma. FISH studies are pending and will be reported. Prior Treatment: Bendamustine-Rituximab x 6 cycles, started 1/2022-5/31/22    Current Treatment: Surveillance    History of Present Illness:   Jay Person is a 47 y.o. female who comes in for evaluation of Follicular lymphoma. She states she has had vague symptoms for several years as follows.  Pt reports h/o pruritus which started 6 yrs ago, which finally subsided with Zyrte. She saw a hematologist at that time, who told her symptoms might surface as a blood disorder in the future. In 2016, pt went to see her Gyn (Dr. Coy Sahu) for abdominal cramping. Workup normal at time time. In summer of 2019, pt developed fatigue as well as same of the chronic pain in abdominal area. In fall of 2019, she developed memory issues with work. She went to see her Psychiatrist due to these symptoms and switched her from generic Wellbutrin to brand name Wellbutrin. No improvement after 3-4 weeks. She was okay with sleeping during that time, but continued to have severe fatigue out of character for her. She went to see her PCP and described a right inguinal LN enlargement. She went to see a surgeon, Dr. Bia Gandara. Ultrasound of that region did demonstrate an enlarged LN. FNA was inconclusive. Core needle biopsy was suspicious for B cell lymphoma. Excisional LN biopsy 9/70/40 revealed follicular lymphoma. CT of chest/abd/pelvis was notable for retroperitoneal, pelvic and portacaval LAD. Pt also reports left hip pain, left foot tingling. No fevers, chills, weight loss. She does have occasional sweats more notable a few months ago, but not drenching or nightly. Patient underwent staging bone marrow biopsy and PET scan. Previously saw [facility] for a 2nd opinion. She was told her GI symptoms are not related to lymphoma. Interval History:  Patient comes in for surveillance of follicular lymphoma. Completed PET. Reports after the final BR treatment, she developed severe joint pain. She thinks symptoms are related to tick bite and symptoms are improving with doxycyline (BID x 1 month). Reports fair energy level. Plans to get tested for sleep apnea. Reports increased hot flashes, last menstrual cycle was in January. No fevers, chills, prominent lymph nodes.      PMHx: Chronic pain, Depression, Fibrocystic breast  PSurgHx: None aside from bilat breast biopsy and wisdom teeth extraction  SHx: Quit smoking 1/2000. Drinks 5 glasses of wine per week. FHx: Mother had breast cancer age 61. Father had CVA. Review of Systems: A complete review of systems was obtained, negative except as described above. Physical Exam:     Visit Vitals  /67 (BP 1 Location: Left upper arm, BP Patient Position: Sitting, BP Cuff Size: Adult)   Pulse 81   Temp 97.9 °F (36.6 °C) (Oral)   Resp 16   Ht 5' 7\" (1.702 m)   Wt 155 lb (70.3 kg)   LMP  (LMP Unknown)   SpO2 98%   BMI 24.28 kg/m²     ECOG PS: 0  General: no distress  Eyes: anicteric sclerae  HENT: oropharynx clear  Neck: supple  Lymphatic: no cervical, axillary adenopathy; prior R inguinal LN and L supraclavicular LN no longer evident  Respiratory: normal respiratory effort  CV: no peripheral edema  GI: soft, nontender, nondistended, no masses  Skin: no rashes; no ecchymoses; no petechiae      Results:     Lab Results   Component Value Date/Time    WBC 3.3 (L) 06/30/2022 10:32 AM    HGB 13.5 06/30/2022 10:32 AM    HCT 39.0 06/30/2022 10:32 AM    PLATELET 407 13/34/1760 10:32 AM    MCV 97.7 06/30/2022 10:32 AM    ABS. NEUTROPHILS 2.2 06/30/2022 10:32 AM     Lab Results   Component Value Date/Time    Sodium 140 06/30/2022 10:32 AM    Potassium 4.2 06/30/2022 10:32 AM    Chloride 105 06/30/2022 10:32 AM    CO2 27 06/30/2022 10:32 AM    Glucose 106 (H) 06/30/2022 10:32 AM    BUN 15 06/30/2022 10:32 AM    Creatinine 0.91 06/30/2022 10:32 AM    GFR est AA >60 06/30/2022 10:32 AM    GFR est non-AA >60 06/30/2022 10:32 AM    Calcium 10.2 (H) 06/30/2022 10:32 AM    Glucose (POC) 104 06/28/2022 07:19 AM     Lab Results   Component Value Date/Time    Bilirubin, total 0.7 06/30/2022 10:32 AM    ALT (SGPT) 46 06/30/2022 10:32 AM    Alk.  phosphatase 106 06/30/2022 10:32 AM    Protein, total 7.3 06/30/2022 10:32 AM    Albumin 4.4 06/30/2022 10:32 AM    Globulin 2.9 06/30/2022 10:32 AM     No results found for: IRON, FE, TIBC, IBCT, PSAT, FERR    No results found for: B12LT, FOL, RBCF  Lab Results   Component Value Date/Time    TSH 1.37 06/30/2022 10:32 AM     Lab Results   Component Value Date/Time    Hepatitis B surface Ag 0.40 01/06/2022 09:13 AM    Hepatitis B surface Ab <3.10 01/06/2022 09:13 AM    Hep B Core Ab, total Negative 01/06/2022 09:13 AM     12/2/21 LD: 170    Imaging:     Chest CT  1/24/20:  FINDINGS:  The normal-sized axillary lymph nodes are unchanged. THYROID: No nodule. MEDIASTINUM: No mass or lymphadenopathy. MARCE: No mass or lymphadenopathy. THORACIC AORTA: No aneurysm. MAIN PULMONARY ARTERY: Normal in caliber. TRACHEA/BRONCHI: Patent. ESOPHAGUS: No wall thickening or dilatation. HEART: Normal in size. PLEURA: No effusion or pneumothorax. LUNGS: Laterally in the left lower lobe on image 53 is a 2 to 3 mm pulmonary  nodule which is unchanged when compared with the examination of 6/27/2017. This  is a benign nodule and not significant. .  There are normal size lymph nodes adjacent to the descending thoracic aorta and  paraspinal region which have increased in size but remain normal in size. .  Abdomen and pelvis: There has been interval development of adenopathy. In the portacaval space there  is a lymph node that measures 17 mm in short axis. There are retroperitoneal  lymph nodes that are enlarged as well. The largest lymph node in the  retroperitoneum is posterior to the inferior vena cava beginning at the level of  the right renal artery this extends inferiorly. This measures 2.4 x 1.0 x 4.0  cm. There are also enlarged lymph nodes in the left aortic region largest  measuring 12 mm in short axis. There are also lymph nodes posterior to the head of the pancreas and anterior to  the inferior vena cava which are enlarged the largest measures 10 mm in short  axis  There are enlarged lymph nodes in the interaortocaval space. Enlarged lymph  nodes along the right common iliac artery largest measuring approximately 12 mm  in short axis.  There are enlarged lymph nodes in the right external iliac chain  largest measuring 9 mm. There is an enlarged lymph node along the right pelvic sidewall measuring 2.5 x  1.3 cm. There are postsurgical changes in the right inguinal region with one  tiny locule of air. There are residual lymph nodes in the right inguinal largest  measuring 10 mm. .  The uterus images normally. There is a 1 cm cyst in the right ovary. There is no free fluid. Review of the bone windows reveals no destructive lesions. The liver and gallbladder are unremarkable. The spleen is normal in size and  configuration. The pancreas and adrenal glands and kidneys are unremarkable. The aorta and inferior vena cava are unremarkable. IMPRESSION:  1. There is is adenopathy as described. There is adenopathy in the right  inguinal region, along the right pelvic sidewall, along the right iliac chain,  bilateral retroperitoneum, and in the portacaval space as well as posterior to  the pancreas head. 2. In the posterior mediastinum adjacent to the descending thoracic aorta are  lymph nodes that measure less than 1 cm in size but these have increased in the  interval.  3. Please see above text    PET 2/10/20:   FINDINGS:  HEAD/NECK: No apparent foci of abnormal hypermetabolism. Cerebral evaluation is  limited by normal intense activity. CHEST: Mildly hypermetabolic left supraclavicular lymph node, maximum SUV 4.2. ABDOMEN/PELVIS: Mildly hypermetabolic portacaval, aortocaval, and left  para-aortic lymph nodes are noted, maximum SUV 5.5 of an aortocaval lymph node. Hypermetabolic right common iliac, right external iliac, right obturator, and  right inguinal lymph nodes.   SKELETON: No foci of abnormal hypermetabolism in the axial and visualized  appendicular skeleton. IMPRESSION: Mildly hypermetabolic left supraclavicular, abdominal,  retroperitoneal, and pelvic lymph nodes.     Ch/abd/pelvis CT 5/7/20:  Lymph nodes in chest: There is no new mediastinal, hilar or axillary lymphadenopathy. Subcentimeter bilateral axillary lymph nodes are unchanged. Subcentimeter  posterior mediastinal lymph node abutting the descending thoracic aorta is  unchanged compared to prior CT dated January 2020. 1.3 cm x 1.1 cm left  supraclavicular lymph node is unchanged compared to prior CT dated January 2020. Lymph nodes in abdom/pelvis: There is a prominent portacaval lymph node measuring approximately  1.6 cm x 2.3 cm, unchanged compared to prior CT dated January 2020. There are multiple prominent and mildly enlarged retroperitoneal lymph nodes, unchanged  compared to prior CT dated January 2020. For example, there is a left  para-aortic retroperitoneal lymph node measures 1.2 cm x 1.6 cm, unchanged  compared to prior CT dated January 2020. As an additional example, there is a 9  mm x 1.3 cm aortocaval lymph node, unchanged compared to prior CT dated January 2020. Right iliac lymph nodes are unchanged compared to prior CT dated January 2020. For example, the largest right iliac lymph node measures 1.1 cm x 1.4 cm,  unchanged compared to prior CT dated January 2020. Right internal obturator  lymph node is unchanged compared to prior CT dated January 2020) measuring  approximately 2.8 cm x 1.4 cm. Prominent right inguinal lymph nodes are not  significantly changed in size. For example, there is a 1 cm x 1.4 cm right  inguinal lymph node, unchanged compared to prior CT dated January 2020. IMPRESSION: No interval change. Discussed with radiology - the right RP LN near R renal artery is unchanged. The 4cm dimension is craniocaudal.    11/9/20 CT c/a/p:  FINDINGS:   LYMPH NODES: There is a prominent portacaval lymph node measuring approximately  1.8 x 2.0 cm, not significantly changed from prior measurements of 1.6 cm x 2.3  cm. There are  multiple prominent and mildly enlarged retroperitoneal lymph nodes, not  significantly changed from prior.  For example, there is a left para-aortic  retroperitoneal lymph node that measures 1.1 x 1.7 cm, not significantly changed  from prior measurements of 1.2 x 1.6 and a 10 x 14 mm aortocaval lymph node, not  significantly changed from prior measurements of 9 x 13 mm. Right iliac lymph  nodes are little changed, though the largest has slightly increased in size  measuring 1.3 x 1.6 cm, previously 1.1 x 1.4 cm. Right internal obturator is not  significantly changed measuring 1.1 x 2.7 cm, previously 1.3 x 3.0 cm. Prominent  right inguinal lymph nodes are not significantly changed in size measuring 1.0 x  1.4 cm. IMPRESSION: No significant interval change apart from a single right common  iliac pelvic lymph nodes which shows slight increase in size by measurement from  1.1 x 1.4 cm to 1.3 x 1.6 cm.    11/18/21 CT ch/abd/pelvis:  LYMPH NODES: Extensive increased retroperitoneal and portacaval adenopathy. 3-67 aortocaval node 25 x 36 mm previously 18 x 9 mm.  3-71 left retroperitoneal node 31 x 26 mm on the previous exam 17 x 11 mm. Right lower quadrant mesenteric adenopathy 3-78 25 x 30 mm previously 14 x 10  mm. VASCULATURE: No aneurysm or dissection. REPRODUCTIVE ORGANS: Uterus and ovaries are unremarkable to  URINARY BLADDER: No mass or calculus. BONES: No destructive bone lesion. ABDOMINAL WALL: No mass or hernia. ADDITIONAL COMMENTS: N/A  IMPRESSION  Imaging findings are consistent with interval progression of disease. Extensive increased mesenteric and retroperitoneal lymphadenopathy with index  lesion measurements as described above. 12/10/21 PET:  FINDINGS:  HEAD/NECK: No apparent foci of abnormal hypermetabolism. Cerebral evaluation is  limited by normal intense activity. CHEST: Left supraclavicular adenopathy SUV 7.5, previous 4.2. New left internal  mammary SUV 3.3.  Right diaphragmatic node with SUV 5.  ABDOMEN/PELVIS: Extensive new hypermetabolic and enlarged nodes: upper abdomen  (SUV 9), mesentery, retrocrural, retroperitoneal, right pelvic (SUV 6) and right  groin (SUV 4.5). A previously measured left aortic node is SUV 6.4, previous  5.5. No splenic activity. SKELETON: No foci of abnormal hypermetabolism in the axial and visualized  appendicular skeleton. IMPRESSION  New/progressed multi level bill disease (Deauville 5).    Each FDG-avid (or previously FDG avid) lesion is rated independently. Reference values:  Mediastinal blood pool: 1.9 SUV  Liver (background): 2.2 SUV    2/17/22 CXR  FINDINGS: The cardiac silhouette is normal in size. There is hyperaeration of  the lungs. There is no evidence of active lung disease. There is evidence of  mild, mid thoracic scoliosis convex to the right. IMPRESSION  Evidence of pulmonary hyperaeration. No evidence of active lung disease. 3/29/22 PET:   FINDINGS:  HEAD/NECK: Maxillary activity is again noted and likely related to underlying  dental disease. Cerebral evaluation is limited by normal intense activity. CHEST: No foci of abnormal hypermetabolism. Resolved left supraclavicular, and  LC, mediastinal, and right cardiophrenic angle lymph node activity. ABDOMEN/PELVIS: No foci of abnormal hypermetabolism. Resolved abdominal,  retrocrural, retroperitoneal, pelvic, and inguinal lymph node activity. SKELETON: Mildly hypermetabolic healing left anterior rib fractures are noted. IMPRESSION  Resolution of all previously described areas of lymph node activity  (all previously described lesions now Deauville 1). Increased tracer activity  corresponds to healing left anterior rib fractures. Maxillary activity likely  related to dental disease. 6/28/22 PET:  IMPRESSION  No Foci of Abnormal Hypermetabolism. Deauville 1    Assessment & Plan:   Conrad Norwood is a 47 y.o. female with Depression comes in for evaluation and management of Follicular lymphoma. 1. Follicular lymphoma / Fatigue:   Stage III (based on L supraclav and abd/pelvis LAD).  Normal LDH and no cytopenias or B symptoms present. Discussed that this is an indolent lymphoma which does not necessarily recommend treatment at time of diagnosis. Patients are observed with H&P and labs every 3-6 months. Treatment is for those who develop B symptoms, cytopenias or bulky disease (LN >7cm). At this time, I am unclear whether patient's symptoms are related to the disease. Furthermore, there are no \"bulky\" LNs > 7cm, but there is a 4cm LN in the right retroperitoneal LN region which if it increased significantly in size could risk of compression of renal vasculature. Bone marrow biopsy shows < 1% monoclonal CD10 positive B cell population (seen on flow cytometry), which is considered negative for BM involvement. PET 2/2020 showed only mildly hypermetabolic uptake with max SUV of 5.5. Previously discussed that treatment options for Stage III, grade 1/2 FL include observation vs chemoimmunotherapy with BR regimen vs single agent Rituxan. CT 11/2021 and 12/2021 PET imaging showed interval progression of mesenteric and retroperitoneal LAD. Pt also with worsening fatigue. At this time, I recommend treatment with BR regimen given local GI symptoms related to mesenteric LAD and fatigue. We discussed the risks and benefits of R-Bendamustine chemotherapy. The potential side effects include, but are not limited to: nausea, vomiting, diarrhea, constipation, flu-like symptoms, infusion reaction, allergic reaction, flushing, taste changes, increased risk of infection, anemia, fatigue, alopecia, mucositis, myelosuppression, infertility and rarely, death. The patient has consented to begin therapy. Supportive medications include: ondansetron, prochlorperazine. 3/29/22 mid-treatment PET showed resolution of all previously described areas of lymph node activity. Completed 6 cycles of BR. PET showed CR on 6/2022. NCCN surveillance: H&P and labs every 3-6 mo for 5 y and then annually or as clinically indicated.  Surveillance imaging: up to 2 y post completion of treatment: C/A/P CT scan with contrast no more than every 6 mo; >2 y CT no more than annually. -- Repeat CT 6 mo, due 12/2022  -- Return in 12 weeks [facility] labs, MD visit. 2. Depression / ADHD / Anxiety: On Buproprion, Fluoxetine, Vyvanse. 3. Irritable bowel syndrome. Currently improved. Advised daily miralax to prevent constipation, but not using currently. Discussed adding more fiber to diet, at least 25 grams daily. 4. Vaccine counseling:  Had Covid booster and flu shot 12/14/21. Received Evusheld in 2 weeks    5. Fatigue:   Likely multifactorial related to treatment, recent tick bite. Completing month of doxycycline. Plans to complete sleep study soon to r/o sleep apnea. 6. Osteoporosis:  Seen on recent DEXA. Advised starting on vitamin D supplementation, avoid calcium with hypercalcemia. Follow up with PCP to discuss bisphosphonates. 7. Hypercalcemia / Low VitD:   Unclear etiology. No on calcium supplements. Advised OTC D3 supplements once daily. -- Check PTH, VitD-25OH with next set of labs. Emotional well being: Pt is coping well with his/her disease and has excellent support. I appreciate the opportunity to participate in Ms. Fam Ash care. I personally saw and evaluated the patient and performed the key components of medical decision making. The history, physical exam, and documentation were performed by Tom Mora NP. I reviewed and verified the above documentation and modified it as needed. Pt has had CR based on PET. Still has fatigue and is getting tested for NEHEMIAS. Low Vit D and advised on this.      Signed By: Monica Ruiz MD     July 12, 2022

## 2022-07-12 ENCOUNTER — OFFICE VISIT (OUTPATIENT)
Dept: ONCOLOGY | Age: 52
End: 2022-07-12
Payer: COMMERCIAL

## 2022-07-12 VITALS
BODY MASS INDEX: 24.33 KG/M2 | HEIGHT: 67 IN | DIASTOLIC BLOOD PRESSURE: 67 MMHG | SYSTOLIC BLOOD PRESSURE: 103 MMHG | TEMPERATURE: 97.9 F | OXYGEN SATURATION: 98 % | RESPIRATION RATE: 16 BRPM | WEIGHT: 155 LBS | HEART RATE: 81 BPM

## 2022-07-12 DIAGNOSIS — C82.13 FOLLICULAR LYMPHOMA GRADE II OF INTRA-ABDOMINAL LYMPH NODES (HCC): Primary | ICD-10-CM

## 2022-07-12 DIAGNOSIS — R53.82 CHRONIC FATIGUE: ICD-10-CM

## 2022-07-12 DIAGNOSIS — E55.9 VITAMIN D DEFICIENCY: ICD-10-CM

## 2022-07-12 DIAGNOSIS — E83.52 HYPERCALCEMIA: ICD-10-CM

## 2022-07-12 PROCEDURE — 99214 OFFICE O/P EST MOD 30 MIN: CPT | Performed by: INTERNAL MEDICINE

## 2022-07-12 RX ORDER — LISDEXAMFETAMINE DIMESYLATE 60 MG/1
CAPSULE ORAL
COMMUNITY
Start: 2022-06-08

## 2022-07-12 NOTE — PROGRESS NOTES
Identified pt with two pt identifiers. Reviewed record in preparation for visit and have obtained necessary documentation. All patient medications has been reviewed. Chief Complaint   Patient presents with    Follow-up     6 week     Additional information about chief complaint:    Visit Vitals  /67 (BP 1 Location: Left upper arm, BP Patient Position: Sitting, BP Cuff Size: Adult)   Pulse 81   Temp 97.9 °F (36.6 °C) (Oral)   Resp 16   Ht 5' 7\" (1.702 m)   Wt 155 lb (70.3 kg)   SpO2 98%   BMI 24.28 kg/m²       Health Maintenance Due   Topic    Hepatitis C Screening     Cervical cancer screen     DTaP/Tdap/Td series (1 - Tdap)    Shingrix Vaccine Age 50> (1 of 2)    Pneumococcal 0-64 years (2 - PCV)    Breast Cancer Screen Mammogram     COVID-19 Vaccine (4 - Booster for Pfizer series)       1. Have you been to the ER, urgent care clinic since your last visit? Hospitalized since your last visit? No    2. Have you seen or consulted any other health care providers outside of the [address] [place] [address] since your last visit? Include any pap smears or colon screening.  No

## 2022-07-13 LAB
B BURGDOR IGG PATRN SER IB-IMP: NEGATIVE
B BURGDOR IGM PATRN SER IB-IMP: NEGATIVE
B BURGDOR18KD IGG SER QL IB: ABNORMAL
B BURGDOR23KD IGG SER QL IB: ABNORMAL
B BURGDOR23KD IGM SER QL IB: PRESENT
B BURGDOR28KD IGG SER QL IB: ABNORMAL
B BURGDOR30KD IGG SER QL IB: ABNORMAL
B BURGDOR39KD IGG SER QL IB: ABNORMAL
B BURGDOR39KD IGM SER QL IB: ABNORMAL
B BURGDOR41KD IGG SER QL IB: ABNORMAL
B BURGDOR41KD IGM SER QL IB: ABNORMAL
B BURGDOR45KD IGG SER QL IB: ABNORMAL
B BURGDOR58KD IGG SER QL IB: ABNORMAL
B BURGDOR66KD IGG SER QL IB: ABNORMAL
B BURGDOR93KD IGG SER QL IB: ABNORMAL

## 2022-10-02 LAB
25(OH)D3+25(OH)D2 SERPL-MCNC: 39 NG/ML (ref 30–100)
ALBUMIN SERPL-MCNC: 4.8 G/DL (ref 3.8–4.9)
ALBUMIN/GLOB SERPL: 2.1 {RATIO} (ref 1.2–2.2)
ALP SERPL-CCNC: 110 IU/L (ref 44–121)
ALT SERPL-CCNC: 17 IU/L (ref 0–32)
AST SERPL-CCNC: 21 IU/L (ref 0–40)
BASOPHILS # BLD AUTO: 0.1 X10E3/UL (ref 0–0.2)
BASOPHILS NFR BLD AUTO: 1 %
BILIRUB SERPL-MCNC: 0.5 MG/DL (ref 0–1.2)
BUN SERPL-MCNC: 13 MG/DL (ref 6–24)
BUN/CREAT SERPL: 16 (ref 9–23)
CALCIUM SERPL-MCNC: 9.8 MG/DL (ref 8.7–10.2)
CHLORIDE SERPL-SCNC: 100 MMOL/L (ref 96–106)
CO2 SERPL-SCNC: 22 MMOL/L (ref 20–29)
CREAT SERPL-MCNC: 0.83 MG/DL (ref 0.57–1)
EGFR: 85 ML/MIN/1.73
EOSINOPHIL # BLD AUTO: 0.1 X10E3/UL (ref 0–0.4)
EOSINOPHIL NFR BLD AUTO: 2 %
ERYTHROCYTE [DISTWIDTH] IN BLOOD BY AUTOMATED COUNT: 11.9 % (ref 11.7–15.4)
GLOBULIN SER CALC-MCNC: 2.3 G/DL (ref 1.5–4.5)
GLUCOSE SERPL-MCNC: 81 MG/DL (ref 70–99)
HCT VFR BLD AUTO: 38.3 % (ref 34–46.6)
HGB BLD-MCNC: 12.9 G/DL (ref 11.1–15.9)
IMM GRANULOCYTES # BLD AUTO: 0 X10E3/UL (ref 0–0.1)
IMM GRANULOCYTES NFR BLD AUTO: 1 %
LDH SERPL-CCNC: 151 IU/L (ref 119–226)
LYMPHOCYTES # BLD AUTO: 0.4 X10E3/UL (ref 0.7–3.1)
LYMPHOCYTES NFR BLD AUTO: 9 %
MCH RBC QN AUTO: 33.3 PG (ref 26.6–33)
MCHC RBC AUTO-ENTMCNC: 33.7 G/DL (ref 31.5–35.7)
MCV RBC AUTO: 99 FL (ref 79–97)
MONOCYTES # BLD AUTO: 0.6 X10E3/UL (ref 0.1–0.9)
MONOCYTES NFR BLD AUTO: 13 %
NEUTROPHILS # BLD AUTO: 3.1 X10E3/UL (ref 1.4–7)
NEUTROPHILS NFR BLD AUTO: 74 %
PLATELET # BLD AUTO: 299 X10E3/UL (ref 150–450)
POTASSIUM SERPL-SCNC: 4.9 MMOL/L (ref 3.5–5.2)
PROT SERPL-MCNC: 7.1 G/DL (ref 6–8.5)
PTH-INTACT SERPL-MCNC: 34 PG/ML (ref 15–65)
RBC # BLD AUTO: 3.87 X10E6/UL (ref 3.77–5.28)
SODIUM SERPL-SCNC: 139 MMOL/L (ref 134–144)
WBC # BLD AUTO: 4.2 X10E3/UL (ref 3.4–10.8)

## 2022-10-11 ENCOUNTER — OFFICE VISIT (OUTPATIENT)
Dept: ONCOLOGY | Age: 52
End: 2022-10-11
Payer: COMMERCIAL

## 2022-10-11 VITALS
HEART RATE: 86 BPM | WEIGHT: 149.8 LBS | SYSTOLIC BLOOD PRESSURE: 125 MMHG | RESPIRATION RATE: 18 BRPM | BODY MASS INDEX: 23.51 KG/M2 | OXYGEN SATURATION: 98 % | TEMPERATURE: 97.9 F | HEIGHT: 67 IN | DIASTOLIC BLOOD PRESSURE: 77 MMHG

## 2022-10-11 DIAGNOSIS — D75.89 MACROCYTOSIS: ICD-10-CM

## 2022-10-11 DIAGNOSIS — C82.13 FOLLICULAR LYMPHOMA GRADE II OF INTRA-ABDOMINAL LYMPH NODES (HCC): Primary | ICD-10-CM

## 2022-10-11 PROCEDURE — 99214 OFFICE O/P EST MOD 30 MIN: CPT | Performed by: INTERNAL MEDICINE

## 2022-10-11 NOTE — LETTER
10/11/2022    Patient: Maninder Greenfield   YOB: 1970   Date of Visit: 10/11/2022     Esther Dolan MD  [address] [facility]  [address]  [address] [place] [address]  Via In [facility] [address]    Dear Esther Dolan MD,      Thank you for referring Ms. Esau Smith to [facility] for evaluation. My notes for this consultation are attached. If you have questions, please do not hesitate to call me. I look forward to following your patient along with you.       Sincerely,    Donna Banuelos MD

## 2022-10-11 NOTE — PROGRESS NOTES
[id number] [facility] [place]  [phone]    Hematology / Oncology Established Visit    Reason for Visit:   Haroldo Duckworth is a 47 y.o. female who is seen for follow up of lymphoma. PCP is Dr. Tami Harris. Hematology Oncology Treatment History:     Diagnosis: Follicular lymphoma    Stage: III; FLIPI2 low risk    Pathology:   -1/20/20 Right inguinal LN excision: Follicular lymphoma, grade 1-2, follicular pattern, AX21 positive. Comment:   The LN specimen demonstrates increased follicles, lacking normal germinal centers. IHC markers are performed with good controls. Tumor cells are positive for CD20 and BCL2. CD3 marks background T cells. Cyclin D1 is negative in the lymphoid population. CD21 highlights intact dendritic meshworks. Flow cytometry shows positive expression of CD10 and kappa LC restriction. Ki-67 is pending. Focally there are a few follicles with greater than fifteen centroblasts per HPF; however the majority of follicles have less than 15 centroblasts/HPF, consistent with grade 1-2. Diffuse areas are not seen.    -2/5/20 Bone marrow biopsy: Normocellular marrow with multilineage hematopoiesis and <1% involvement by a CD10 positive B-cell lymphoproliferative disorder. Negative for immunophenotypic or morphologic evidence of dysplasia no increase in blasts. Comment   Flow cytometric analysis reveals a small population of CD10 positive clonal B cells compatible with involvement by the patient's recently diagnosed follicular lymphoma. FISH studies are pending and will be reported. Prior Treatment: Bendamustine-Rituximab x 6 cycles, started 1/2022-5/31/22    Current Treatment: Surveillance    History of Present Illness:   Haroldo Duckworth is a 47 y.o. female who comes in for evaluation of Follicular lymphoma. At presentation, pt reported vague symptoms for several years: h/o pruritus which started 6 yrs prior and finally subsided with Zyrtec.  She saw a hematologist at that time, who told her symptoms might surface as a blood disorder in the future. In 2016, pt saw Gynecology (Dr. Vanessa Winn) for abdominal cramping. Workup normal at time time. In summer of 2019, pt developed fatigue as well as same of the chronic pain in abdominal area. In fall of 2019, she developed memory issues with work. She went to see her Psychiatrist due to these symptoms and switched her from generic Wellbutrin to brand name Wellbutrin. No improvement after 3-4 weeks. She was okay with sleeping during that time, but continued to have severe fatigue out of character for her. She went to see her PCP and noted right inguinal LN enlargement. She went to see a surgeon, Dr. Eyal Prado. Ultrasound of that region did demonstrate an enlarged LN. FNA was inconclusive. Core needle biopsy was suspicious for B cell lymphoma. Excisional LN biopsy 0/38/12 revealed follicular lymphoma. CT of chest/abd/pelvis was notable for retroperitoneal, pelvic and portacaval LAD. Pt also reported left hip pain, left foot tingling. No fevers, chills, weight loss. She did have occasional sweats which come and go. Patient underwent staging bone marrow biopsy and PET scan. Previously saw [facility] for a 2nd opinion. She was told her GI symptoms are not related to lymphoma. Interval History:  Patient comes in for surveillance of follicular lymphoma. Completed WatchPat home sleep study with pulmonology 7/20/22 and was advised trial of CPAP given findings consistent with mild obstructive sleep apnea and moderate apnea noted during REM sleep. Using CPAP for 2 weeks with improvement in fatigue. Denies recent infections, fevers or new LAD. Has chills and night sweats. PMHx: Chronic pain, Depression, Fibrocystic breast  PSurgHx: None aside from bilat breast biopsy and wisdom teeth extraction  SHx: Quit smoking 1/2000. Drinks 5 glasses of wine per week. FHx: Mother had breast cancer age 61. Father had CVA.    Review of Systems: A complete review of systems was obtained, negative except as described above. Physical Exam:     Visit Vitals  /77   Pulse 86   Temp 97.9 °F (36.6 °C) (Oral)   Resp 18   Ht 5' 7\" (1.702 m)   Wt 149 lb 12.8 oz (67.9 kg)   SpO2 98%   BMI 23.46 kg/m²       ECOG PS: 0  General: no distress  Eyes: anicteric sclerae  HENT: oropharynx clear  Neck: supple  Lymphatic: no cervical, axillary adenopathy; prior R inguinal LN and L supraclavicular LN no longer evident  Respiratory: normal respiratory effort  CV: no peripheral edema  GI: soft, nontender, nondistended, no masses  Skin: no rashes; no ecchymoses; no petechiae      Results:     Lab Results   Component Value Date/Time    WBC 4.2 09/30/2022 12:18 PM    HGB 12.9 09/30/2022 12:18 PM    HCT 38.3 09/30/2022 12:18 PM    PLATELET 315 58/72/4064 12:18 PM    MCV 99 (H) 09/30/2022 12:18 PM    ABS. NEUTROPHILS 3.1 09/30/2022 12:18 PM     Lab Results   Component Value Date/Time    Sodium 139 09/30/2022 12:18 PM    Potassium 4.9 09/30/2022 12:18 PM    Chloride 100 09/30/2022 12:18 PM    CO2 22 09/30/2022 12:18 PM    Glucose 81 09/30/2022 12:18 PM    BUN 13 09/30/2022 12:18 PM    Creatinine 0.83 09/30/2022 12:18 PM    GFR est AA >60 06/30/2022 10:32 AM    GFR est non-AA >60 06/30/2022 10:32 AM    Calcium 9.8 09/30/2022 12:18 PM    Glucose (POC) 104 06/28/2022 07:19 AM     Lab Results   Component Value Date/Time    Bilirubin, total 0.5 09/30/2022 12:18 PM    ALT (SGPT) 17 09/30/2022 12:18 PM    Alk.  phosphatase 110 09/30/2022 12:18 PM    Protein, total 7.1 09/30/2022 12:18 PM    Albumin 4.8 09/30/2022 12:18 PM    Globulin 2.9 06/30/2022 10:32 AM     No results found for: IRON, FE, TIBC, IBCT, PSAT, FERR    No results found for: B12LT, FOL, RBCF  Lab Results   Component Value Date/Time    TSH 1.37 06/30/2022 10:32 AM     Lab Results   Component Value Date/Time    Hepatitis B surface Ag 0.40 01/06/2022 09:13 AM    Hepatitis B surface Ab <3.10 01/06/2022 09:13 AM Hep B Core Ab, total Negative 01/06/2022 09:13 AM     12/2/21 LD: 170    Imaging:     Chest CT  1/24/20:  FINDINGS:  The normal-sized axillary lymph nodes are unchanged. THYROID: No nodule. MEDIASTINUM: No mass or lymphadenopathy. MARCE: No mass or lymphadenopathy. THORACIC AORTA: No aneurysm. MAIN PULMONARY ARTERY: Normal in caliber. TRACHEA/BRONCHI: Patent. ESOPHAGUS: No wall thickening or dilatation. HEART: Normal in size. PLEURA: No effusion or pneumothorax. LUNGS: Laterally in the left lower lobe on image 53 is a 2 to 3 mm pulmonary  nodule which is unchanged when compared with the examination of 6/27/2017. This  is a benign nodule and not significant. .  There are normal size lymph nodes adjacent to the descending thoracic aorta and  paraspinal region which have increased in size but remain normal in size. .  Abdomen and pelvis: There has been interval development of adenopathy. In the portacaval space there  is a lymph node that measures 17 mm in short axis. There are retroperitoneal  lymph nodes that are enlarged as well. The largest lymph node in the  retroperitoneum is posterior to the inferior vena cava beginning at the level of  the right renal artery this extends inferiorly. This measures 2.4 x 1.0 x 4.0  cm. There are also enlarged lymph nodes in the left aortic region largest  measuring 12 mm in short axis. There are also lymph nodes posterior to the head of the pancreas and anterior to  the inferior vena cava which are enlarged the largest measures 10 mm in short  axis  There are enlarged lymph nodes in the interaortocaval space. Enlarged lymph  nodes along the right common iliac artery largest measuring approximately 12 mm  in short axis. There are enlarged lymph nodes in the right external iliac chain  largest measuring 9 mm. There is an enlarged lymph node along the right pelvic sidewall measuring 2.5 x  1.3 cm.  There are postsurgical changes in the right inguinal region with one  tiny locule of air. There are residual lymph nodes in the right inguinal largest  measuring 10 mm. .  The uterus images normally. There is a 1 cm cyst in the right ovary. There is no free fluid. Review of the bone windows reveals no destructive lesions. The liver and gallbladder are unremarkable. The spleen is normal in size and  configuration. The pancreas and adrenal glands and kidneys are unremarkable. The aorta and inferior vena cava are unremarkable. IMPRESSION:  1. There is is adenopathy as described. There is adenopathy in the right  inguinal region, along the right pelvic sidewall, along the right iliac chain,  bilateral retroperitoneum, and in the portacaval space as well as posterior to  the pancreas head. 2. In the posterior mediastinum adjacent to the descending thoracic aorta are  lymph nodes that measure less than 1 cm in size but these have increased in the  interval.  3. Please see above text    PET 2/10/20:   FINDINGS:  HEAD/NECK: No apparent foci of abnormal hypermetabolism. Cerebral evaluation is  limited by normal intense activity. CHEST: Mildly hypermetabolic left supraclavicular lymph node, maximum SUV 4.2. ABDOMEN/PELVIS: Mildly hypermetabolic portacaval, aortocaval, and left  para-aortic lymph nodes are noted, maximum SUV 5.5 of an aortocaval lymph node. Hypermetabolic right common iliac, right external iliac, right obturator, and  right inguinal lymph nodes. SKELETON: No foci of abnormal hypermetabolism in the axial and visualized  appendicular skeleton. IMPRESSION: Mildly hypermetabolic left supraclavicular, abdominal,  retroperitoneal, and pelvic lymph nodes. Ch/abd/pelvis CT 5/7/20:  Lymph nodes in chest: There is no new mediastinal, hilar or axillary lymphadenopathy. Subcentimeter bilateral axillary lymph nodes are unchanged. Subcentimeter  posterior mediastinal lymph node abutting the descending thoracic aorta is  unchanged compared to prior CT dated January 2020. 1.3 cm x 1.1 cm left  supraclavicular lymph node is unchanged compared to prior CT dated January 2020. Lymph nodes in abdom/pelvis: There is a prominent portacaval lymph node measuring approximately  1.6 cm x 2.3 cm, unchanged compared to prior CT dated January 2020. There are multiple prominent and mildly enlarged retroperitoneal lymph nodes, unchanged  compared to prior CT dated January 2020. For example, there is a left  para-aortic retroperitoneal lymph node measures 1.2 cm x 1.6 cm, unchanged  compared to prior CT dated January 2020. As an additional example, there is a 9  mm x 1.3 cm aortocaval lymph node, unchanged compared to prior CT dated January 2020. Right iliac lymph nodes are unchanged compared to prior CT dated January 2020. For example, the largest right iliac lymph node measures 1.1 cm x 1.4 cm,  unchanged compared to prior CT dated January 2020. Right internal obturator  lymph node is unchanged compared to prior CT dated January 2020) measuring  approximately 2.8 cm x 1.4 cm. Prominent right inguinal lymph nodes are not  significantly changed in size. For example, there is a 1 cm x 1.4 cm right  inguinal lymph node, unchanged compared to prior CT dated January 2020. IMPRESSION: No interval change. Discussed with radiology - the right RP LN near R renal artery is unchanged. The 4cm dimension is craniocaudal.    11/9/20 CT c/a/p:  FINDINGS:   LYMPH NODES: There is a prominent portacaval lymph node measuring approximately  1.8 x 2.0 cm, not significantly changed from prior measurements of 1.6 cm x 2.3  cm. There are  multiple prominent and mildly enlarged retroperitoneal lymph nodes, not  significantly changed from prior. For example, there is a left para-aortic  retroperitoneal lymph node that measures 1.1 x 1.7 cm, not significantly changed  from prior measurements of 1.2 x 1.6 and a 10 x 14 mm aortocaval lymph node, not  significantly changed from prior measurements of 9 x 13 mm.  Right iliac lymph  nodes are little changed, though the largest has slightly increased in size  measuring 1.3 x 1.6 cm, previously 1.1 x 1.4 cm. Right internal obturator is not  significantly changed measuring 1.1 x 2.7 cm, previously 1.3 x 3.0 cm. Prominent  right inguinal lymph nodes are not significantly changed in size measuring 1.0 x  1.4 cm. IMPRESSION: No significant interval change apart from a single right common  iliac pelvic lymph nodes which shows slight increase in size by measurement from  1.1 x 1.4 cm to 1.3 x 1.6 cm.    11/18/21 CT ch/abd/pelvis:  LYMPH NODES: Extensive increased retroperitoneal and portacaval adenopathy. 3-67 aortocaval node 25 x 36 mm previously 18 x 9 mm.  3-71 left retroperitoneal node 31 x 26 mm on the previous exam 17 x 11 mm. Right lower quadrant mesenteric adenopathy 3-78 25 x 30 mm previously 14 x 10  mm. VASCULATURE: No aneurysm or dissection. REPRODUCTIVE ORGANS: Uterus and ovaries are unremarkable to  URINARY BLADDER: No mass or calculus. BONES: No destructive bone lesion. ABDOMINAL WALL: No mass or hernia. ADDITIONAL COMMENTS: N/A  IMPRESSION  Imaging findings are consistent with interval progression of disease. Extensive increased mesenteric and retroperitoneal lymphadenopathy with index  lesion measurements as described above. 12/10/21 PET:  FINDINGS:  HEAD/NECK: No apparent foci of abnormal hypermetabolism. Cerebral evaluation is  limited by normal intense activity. CHEST: Left supraclavicular adenopathy SUV 7.5, previous 4.2. New left internal  mammary SUV 3.3. Right diaphragmatic node with SUV 5.  ABDOMEN/PELVIS: Extensive new hypermetabolic and enlarged nodes: upper abdomen  (SUV 9), mesentery, retrocrural, retroperitoneal, right pelvic (SUV 6) and right  groin (SUV 4.5). A previously measured left aortic node is SUV 6.4, previous  5.5. No splenic activity.   SKELETON: No foci of abnormal hypermetabolism in the axial and visualized  appendicular skeleton. IMPRESSION  New/progressed multi level bill disease (Deauville 5). Each FDG-avid (or previously FDG avid) lesion is rated independently. Reference values:  Mediastinal blood pool: 1.9 SUV  Liver (background): 2.2 SUV    2/17/22 CXR  FINDINGS: The cardiac silhouette is normal in size. There is hyperaeration of  the lungs. There is no evidence of active lung disease. There is evidence of  mild, mid thoracic scoliosis convex to the right. IMPRESSION  Evidence of pulmonary hyperaeration. No evidence of active lung disease. 3/29/22 PET:   FINDINGS:  HEAD/NECK: Maxillary activity is again noted and likely related to underlying  dental disease. Cerebral evaluation is limited by normal intense activity. CHEST: No foci of abnormal hypermetabolism. Resolved left supraclavicular, and  LC, mediastinal, and right cardiophrenic angle lymph node activity. ABDOMEN/PELVIS: No foci of abnormal hypermetabolism. Resolved abdominal,  retrocrural, retroperitoneal, pelvic, and inguinal lymph node activity. SKELETON: Mildly hypermetabolic healing left anterior rib fractures are noted. IMPRESSION  Resolution of all previously described areas of lymph node activity  (all previously described lesions now Deauville 1). Increased tracer activity  corresponds to healing left anterior rib fractures. Maxillary activity likely  related to dental disease. 6/28/22 PET:  IMPRESSION  No Foci of Abnormal Hypermetabolism. Deauville 1    Assessment & Plan:   Luc Bosch is a 47 y.o. female with Depression comes in for evaluation and management of Follicular lymphoma. 1. Follicular lymphoma / Fatigue:   Stage III (based on L supraclav and abd/pelvis LAD). Normal LDH and no cytopenias or B symptoms present. Discussed that this is an indolent lymphoma which does not necessarily recommend treatment at time of diagnosis. Patients are observed with H&P and labs every 3-6 months.  Treatment is for those who develop B symptoms, cytopenias or bulky disease (LN >7cm). Patient was observed for several months. CT 11/2021 and PET 12/2021 showed interval progression of mesenteric and retroperitoneal LAD, coinciding with patient's worsening fatigue. Therefore, patient was treated with BR regimen x 6 cycles. End of treatment PET showed CR in 6/2022. NCCN surveillance: H&P and labs every 3-6 mo for 5 y and then annually or as clinically indicated. Surveillance imaging: up to 2 y post completion of treatment: C/A/P CT scan with contrast no more than every 6 mo; >2 y CT no more than annually. -- Repeat CT 6 mo, due 12/2022  -- Return in 12 weeks [facility] labs, MD visit. 2. Depression / ADHD / Anxiety: On Buproprion, Fluoxetine, Vyvanse. 3. Vaccine counseling:  Received COVID vaccines and booster. Received Evusheld 6/29/22. -- Advised seasonal influenza vaccine now and COVID booster around 12/2022.     4. Fatigue / sleep apnea / macrocytosis:   Fatigue improved with use of CPAP. -- Check B12, folate with next set of labs. 5. Osteoporosis:  Seen on recent DEXA. On vitamin D supplementation, avoid calcium with history of hypercalcemia. Discussed 3 servings of calcium daily. -- Advised follow up with PCP to discuss bisphosphonates. Emotional well being: Pt is coping well with his/her disease and has excellent support. I appreciate the opportunity to participate in Ms. Mijares [facility] care. I personally saw and evaluated the patient and performed the key components of medical decision making. The history, physical exam, and documentation were performed by Mao Brewster NP. I reviewed and verified the above documentation and modified it as needed. Continue on surveillance. Pt is currently doing quite well with resolution of prior cytopenias. Fatigue improving with use of CPAP. Sweats may be due to menopause.     Signed By: Ruben Masters MD     October 11, 2022

## 2022-11-29 ENCOUNTER — HOSPITAL ENCOUNTER (EMERGENCY)
Age: 52
Discharge: HOME | End: 2022-11-29
Attending: STUDENT IN AN ORGANIZED HEALTH CARE EDUCATION/TRAINING PROGRAM | Admitting: STUDENT IN AN ORGANIZED HEALTH CARE EDUCATION/TRAINING PROGRAM
Payer: COMMERCIAL

## 2022-11-29 VITALS
BODY MASS INDEX: 23.54 KG/M2 | HEART RATE: 93 BPM | SYSTOLIC BLOOD PRESSURE: 118 MMHG | RESPIRATION RATE: 15 BRPM | TEMPERATURE: 98 F | OXYGEN SATURATION: 94 % | DIASTOLIC BLOOD PRESSURE: 69 MMHG | HEIGHT: 67 IN | WEIGHT: 150 LBS

## 2022-11-29 DIAGNOSIS — T78.2XXA ANAPHYLAXIS, INITIAL ENCOUNTER: Primary | ICD-10-CM

## 2022-11-29 LAB
COMMENT, HOLDF: NORMAL
SAMPLES BEING HELD,HOLD: NORMAL

## 2022-11-29 PROCEDURE — 99284 EMERGENCY DEPT VISIT MOD MDM: CPT

## 2022-11-29 PROCEDURE — 96372 THER/PROPH/DIAG INJ SC/IM: CPT

## 2022-11-29 PROCEDURE — 74011000250 HC RX REV CODE- 250: Performed by: STUDENT IN AN ORGANIZED HEALTH CARE EDUCATION/TRAINING PROGRAM

## 2022-11-29 PROCEDURE — 96375 TX/PRO/DX INJ NEW DRUG ADDON: CPT

## 2022-11-29 PROCEDURE — 74011250636 HC RX REV CODE- 250/636: Performed by: STUDENT IN AN ORGANIZED HEALTH CARE EDUCATION/TRAINING PROGRAM

## 2022-11-29 PROCEDURE — 74011250636 HC RX REV CODE- 250/636

## 2022-11-29 PROCEDURE — 96374 THER/PROPH/DIAG INJ IV PUSH: CPT

## 2022-11-29 RX ORDER — PREDNISONE 50 MG/1
50 TABLET ORAL DAILY
Qty: 3 TABLET | Refills: 0 | Status: SHIPPED | OUTPATIENT
Start: 2022-11-29 | End: 2022-12-02

## 2022-11-29 RX ORDER — EPINEPHRINE 0.3 MG/.3ML
0.3 INJECTION SUBCUTANEOUS
Qty: 1 EACH | Refills: 3 | Status: SHIPPED | OUTPATIENT
Start: 2022-11-29 | End: 2022-11-29

## 2022-11-29 RX ORDER — DIPHENHYDRAMINE HYDROCHLORIDE 50 MG/ML
25 INJECTION, SOLUTION INTRAMUSCULAR; INTRAVENOUS ONCE
Status: COMPLETED | OUTPATIENT
Start: 2022-11-29 | End: 2022-11-29

## 2022-11-29 RX ORDER — EPINEPHRINE 1 MG/ML
0.5 INJECTION, SOLUTION, CONCENTRATE INTRAVENOUS
Status: COMPLETED | OUTPATIENT
Start: 2022-11-29 | End: 2022-11-29

## 2022-11-29 RX ORDER — EPINEPHRINE 1 MG/ML
INJECTION, SOLUTION, CONCENTRATE INTRAVENOUS
Status: COMPLETED
Start: 2022-11-29 | End: 2022-11-29

## 2022-11-29 RX ADMIN — FAMOTIDINE 20 MG: 10 INJECTION, SOLUTION INTRAVENOUS at 16:47

## 2022-11-29 RX ADMIN — METHYLPREDNISOLONE SODIUM SUCCINATE 125 MG: 125 INJECTION, POWDER, FOR SOLUTION INTRAMUSCULAR; INTRAVENOUS at 16:46

## 2022-11-29 RX ADMIN — DIPHENHYDRAMINE HYDROCHLORIDE 25 MG: 50 INJECTION, SOLUTION INTRAMUSCULAR; INTRAVENOUS at 16:47

## 2022-11-29 RX ADMIN — EPINEPHRINE 0.5 MG: 1 INJECTION, SOLUTION, CONCENTRATE INTRAVENOUS at 16:40

## 2022-11-29 RX ADMIN — EPINEPHRINE: 1 INJECTION, SOLUTION, CONCENTRATE INTRAVENOUS at 16:40

## 2022-11-29 NOTE — ED NOTES
Pt reports she is feeling a little bit better following meds admin. Uvula and throat still noted to be swollen, however improved from initial assessment. VSS.

## 2022-11-29 NOTE — ED TRIAGE NOTES
Patient arrives to ed via pov with c/o \"throat swollen/closing\" that started while riding her horse. Per pt she has had 2 episodes, one 5 years ago and another 2 months ago and has followed up with an allergist with no answers as to what her allergy is. Pt denies anything abnormal today as far as medications or foods. Pt sts she took 2 benadryl's PTA. Patient speech garbled however able to understand and appears to be maintaining her airway. No apparent facial edema noted. Swelling noted the throat.

## 2022-11-29 NOTE — ED PROVIDER NOTES
52-year-old woman with history of follicular lymphoma, no current chemotherapy ongoing, presenting with anaphylaxis. She was riding her horse and suddenly developed swelling of the left side of her throat. She did does not recall a bite or sting. She had 1 episode similar to this earlier this year without a clear trigger and resolved gradually after she received Benadryl. She states that this has been gradually worsening over the past 20 minutes. Denies pruritus. She has noticed facial swelling, swelling of the base of her tongue. She is able to tolerate her secretions. She has abnormal phonation. No wheezing, does endorse mild indigestion. No rash over her extremities. No history of anaphylaxis. No pain complaints. Past Medical History:   Diagnosis Date    Cancer [facility])     Chronic pain     Depression 2/10/2009    Fibrocystic breast 2/10/2009       Past Surgical History:   Procedure Laterality Date    HX BREAST BIOPSY Bilateral 20 + yrs ago    neg    HX HEENT      wisdom teeth extraction         Family History:   Problem Relation Age of Onset    Cancer Mother         breast-63    Breast Cancer Mother 61    Stroke Father     Diabetes Neg Hx     Heart Disease Neg Hx     Hypertension Neg Hx        Social History     Socioeconomic History    Marital status:      Spouse name: Not on file    Number of children: Not on file    Years of education: Not on file    Highest education level: Not on file   Occupational History    Not on file   Tobacco Use    Smoking status: Former     Types: Cigarettes     Quit date: 2000     Years since quittin.8    Smokeless tobacco: Never   Substance and Sexual Activity    Alcohol use:  Yes     Alcohol/week: 4.2 standard drinks     Types: 5 Glasses of wine per week     Comment: occassionally    Drug use: No    Sexual activity: Yes   Other Topics Concern    Not on file   Social History Narrative    Not on file     Social Determinants of Health     Financial Resource Strain: Not on file   Food Insecurity: Not on file   Transportation Needs: Not on file   Physical Activity: Not on file   Stress: Not on file   Social Connections: Not on file   Intimate Partner Violence: Not on file   Housing Stability: Not on file         ALLERGIES: Patient has no known allergies. Review of Systems   Constitutional:  Negative for chills and fever. HENT:  Positive for facial swelling and voice change. Negative for sinus pressure and sneezing. Eyes:  Negative for photophobia. Respiratory:  Negative for shortness of breath. Cardiovascular:  Negative for chest pain. Gastrointestinal:  Negative for abdominal pain, nausea and vomiting. Genitourinary:  Negative for dysuria and flank pain. Musculoskeletal:  Negative for back pain. Neurological:  Negative for headaches. Psychiatric/Behavioral:  Negative for confusion. All other systems reviewed and are negative. Vitals:    11/29/22 1730 11/29/22 1745 11/29/22 1800 11/29/22 1815   BP: 116/64 118/66 115/66 118/69   Pulse: 89 88 89 93   Resp: 20 16 16 15   Temp:       SpO2: 98% 97% 97% 94%   Weight:       Height:                Physical Exam  Constitutional:       General: She is not in acute distress. Appearance: She is not toxic-appearing. HENT:      Head: Normocephalic and atraumatic. Mouth/Throat:      Mouth: Mucous membranes are moist.   Eyes:      Extraocular Movements: Extraocular movements intact. Cardiovascular:      Rate and Rhythm: Normal rate and regular rhythm. Heart sounds: Normal heart sounds. Pulmonary:      Effort: Pulmonary effort is normal. No respiratory distress. Breath sounds: Normal breath sounds. Abdominal:      Palpations: Abdomen is soft. Tenderness: There is no abdominal tenderness. Musculoskeletal:      Cervical back: Normal range of motion. Right lower leg: No edema. Left lower leg: No edema.    Skin:     Capillary Refill: Capillary refill takes less than 2 seconds. Neurological:      General: No focal deficit present. Mental Status: She is alert and oriented to person, place, and time. Psychiatric:         Mood and Affect: Mood normal.        [facility]    ED Course as of 12/03/22 0600   Tue Nov 29, 2022   1706 Oropharyngeal swelling appears to be improving. [NS]   1904 Near resolution of op swelling and subjectively improved [NS]      ED Course User Index  [NS] Daniel Chaudhary MD       Patient presenting with sudden onset of anaphylaxis with oropharyngeal swelling and facial edema without clear provoking factors. She been seen by allergy in the past but has not had exacerbation of this type recently. Was given prescription for EpiPen, steroid course and will follow-up again with her allergist.  Her symptoms nearly resolved by the time she was discharged. PROGRESS NOTE:  7 PM  The patient has been re-evaluated and are stable for discharge. All available radiology and laboratory results have been reviewed with patient and/or available family. Patient and/or family verbally conveyed their understanding and agreement of the patient's signs, symptoms, diagnosis, treatment and prognosis and additionally agree to follow-up as recommended in the discharge instructions or to return to the Emergency Department should their condition change or worsen prior to their follow-up appointment. All questions have been answered and patient and/or available family who express understanding.       LABORATORY RESULTS:  Labs Reviewed   SAMPLES BEING HELD       IMAGING RESULTS:  No orders to display       MEDICATIONS GIVEN:  Medications   EPINEPHrine HCl (PF) (ADRENALIN) 1 mg/mL (1 mL) injection 0.5 mg (0.5 mg IntraMUSCular Given 11/29/22 1640)   methylPREDNISolone (PF) (Solu-MEDROL) injection 125 mg (125 mg IntraVENous Given 11/29/22 1646)   EPINEPHrine HCl (PF) (ADRENALIN) 1 mg/mL (1 mL) injection (  Given 11/29/22 1640)   diphenhydrAMINE (BENADRYL) injection 25 mg (25 mg IntraVENous Given 11/29/22 1647)       IMPRESSION:  1. Anaphylaxis, initial encounter        PLAN:  Follow-up Information       Follow up With Specialties Details Why Contact Info    Fernando Garcia MD Internal Medicine Physician Schedule an appointment as soon as possible for a visit   [address]  [address] [facility]  [phone]      follow up with your allergist               Discharge Medication List as of 11/29/2022  7:11 PM        START taking these medications    Details   EPINEPHrine (EPIPEN) 0.3 mg/0.3 mL injection 0.3 mL by IntraMUSCular route once as needed for Allergic Response for up to 1 dose., Normal, Disp-1 Each, R-3      predniSONE (DELTASONE) 50 mg tablet Take 1 Tablet by mouth daily for 3 days. , Normal, Disp-3 Tablet, R-0           CONTINUE these medications which have NOT CHANGED    Details   Vyvanse 60 mg capsule Historical Med, VARINDER      clonazePAM (KlonoPIN) 0.5 mg tablet Historical Med      buPROPion XL (WELLBUTRIN XL) 150 mg tablet 150 mg every morning., Historical Med               Emmanuel Nichole MD      Please note that this dictation was completed with [employer], the [employer] voice recognition software. Quite often unanticipated grammatical, syntax, homophones, and other interpretive errors are inadvertently transcribed by the computer software. Please disregard these errors. Please excuse any errors that have escaped final proofreading. Procedures                             I have spent 45 minutes of critical care time involved in lab review, consultations with specialist, family decision-making, and documentation. During this entire length of time I was immediately available to the patient and/or family.     Emmanuel Nichole MD

## 2022-11-30 NOTE — ED NOTES
The patient was discharged home in stable condition. The patient is alert and oriented, in no respiratory distress. The patient's diagnosis, condition and treatment were explained. The patient expressed understanding. Prescriptions given/e-scribed to pharmacy. No work/school note given. A discharge plan has been developed. A  was not involved in the process. Aftercare instructions were given. Pt ambulatory out of the ED.

## 2022-12-01 ENCOUNTER — TELEPHONE (OUTPATIENT)
Dept: ONCOLOGY | Age: 52
End: 2022-12-01

## 2022-12-01 NOTE — TELEPHONE ENCOUNTER
Patient called and stated that she had 2 anaphylactic reactions in the last month and would like to speak to someone on what may be causing it and what she can do.      # [phone]

## 2022-12-01 NOTE — TELEPHONE ENCOUNTER
[address] [place]  [phone]        12/01/22 2:22 PM Attempted to place return call to patient via contact number provided. No answer, left message. Provided office phone number as well for call back. 2:41 PM Received return call from patient. Patient stated that she experienced anaphylactic reaction about two months ago when changing a light bulb. Patient had complaints of throat swelling. She took two benadryl and then proceeded to ED with a neighbor. Patient sat outside ED, as she preferred not to go to hospital unless symptoms worsened. Symptoms resolved on their own after 2 hours. Patient saw allergist--patient had mild allergy to dander but otherwise negative work up. Patient prescribed Nasacort x 1 month. Patient then experienced repeat episode on 11/29. Patient treated in ED (records viewable in [facility]). She had complaints of throat tightness, lip and facial swelling, and facial numbness. Denied urticaria. Patient discharged with EpiPen. 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 Patient inquiring if she should have earlier office visit with Dr. Deni Dalton to discuss and/or if she should undergo labs to check for above deficiency. Patient also sending articles via [employer] related to this. Advised this nurse would forward above to Dr. Deni Dalton and Merlin Gerold and call patient back with further recommendations. She voiced understanding.

## 2022-12-12 ENCOUNTER — HOSPITAL ENCOUNTER (OUTPATIENT)
Dept: CT IMAGING | Age: 52
Discharge: HOME | End: 2022-12-12
Attending: NURSE PRACTITIONER
Payer: COMMERCIAL

## 2022-12-12 DIAGNOSIS — C82.13 FOLLICULAR LYMPHOMA GRADE II OF INTRA-ABDOMINAL LYMPH NODES (HCC): ICD-10-CM

## 2022-12-12 PROCEDURE — 74011000636 HC RX REV CODE- 636: Performed by: NURSE PRACTITIONER

## 2022-12-12 PROCEDURE — 74177 CT ABD & PELVIS W/CONTRAST: CPT

## 2022-12-12 RX ADMIN — IOPAMIDOL 100 ML: 755 INJECTION, SOLUTION INTRAVENOUS at 09:00

## 2022-12-12 NOTE — PROGRESS NOTES
[id number] [facility] Oncology at [address] [place] [address]  [phone]    Hematology / Oncology Established Visit    Reason for Visit:   Lori Novak is a 47 y.o. female who is seen for follow up of lymphoma. PCP is Dr. Lavell Peoples. Hematology Oncology Treatment History:     Diagnosis: Follicular lymphoma    Stage: III; FLIPI2 low risk    Pathology:   -1/20/20 Right inguinal LN excision: Follicular lymphoma, grade 1-2, follicular pattern, CF44 positive. Comment:   The LN specimen demonstrates increased follicles, lacking normal germinal centers. IHC markers are performed with good controls. Tumor cells are positive for CD20 and BCL2. CD3 marks background T cells. Cyclin D1 is negative in the lymphoid population. CD21 highlights intact dendritic meshworks. Flow cytometry shows positive expression of CD10 and kappa LC restriction. Ki-67 is pending. Focally there are a few follicles with greater than fifteen centroblasts per HPF; however the majority of follicles have less than 15 centroblasts/HPF, consistent with grade 1-2. Diffuse areas are not seen.    -2/5/20 Bone marrow biopsy: Normocellular marrow with multilineage hematopoiesis and <1% involvement by a CD10 positive B-cell lymphoproliferative disorder. Negative for immunophenotypic or morphologic evidence of dysplasia no increase in blasts. Comment   Flow cytometric analysis reveals a small population of CD10 positive clonal B cells compatible with involvement by the patient's recently diagnosed follicular lymphoma. FISH studies are pending and will be reported. Prior Treatment: Bendamustine-Rituximab x 6 cycles, started 1/2022-5/31/22    Current Treatment: Surveillance    History of Present Illness:   Lori Novak is a 47 y.o. female who comes in for evaluation of Follicular lymphoma. At presentation, pt reported vague symptoms for several years: h/o pruritus which started 6 yrs prior and finally subsided with Zyrtec.  She saw a hematologist at that time, who told her symptoms might surface as a blood disorder in the future. In 2016, pt saw Gynecology (Dr. Nic Davison) for abdominal cramping. Workup normal at time time. In summer of 2019, pt developed fatigue as well as same of the chronic pain in abdominal area. In fall of 2019, she developed memory issues with work. She went to see her Psychiatrist due to these symptoms and switched her from generic Wellbutrin to brand name Wellbutrin. No improvement after 3-4 weeks. She was okay with sleeping during that time, but continued to have severe fatigue out of character for her. She went to see her PCP and noted right inguinal LN enlargement. She went to see a surgeon, Dr. Chetan Hernandez. Ultrasound of that region did demonstrate an enlarged LN. FNA was inconclusive. Core needle biopsy was suspicious for B cell lymphoma. Excisional LN biopsy 8/70/62 revealed follicular lymphoma. CT of chest/abd/pelvis was notable for retroperitoneal, pelvic and portacaval LAD. Pt also reported left hip pain, left foot tingling. No fevers, chills, weight loss. She did have occasional sweats which come and go. Patient underwent staging bone marrow biopsy and PET scan. Previously saw [facility] for a 2nd opinion. She was told her GI symptoms are not related to lymphoma. Interval History:  Patient comes in for urgent follow up. Reports 2 episodes of anaphylaxis that occured 2 months apart. First episode with throat burning, followed by swelling/closing and tongue numbness/lip swelling which resolved after Benadryl. After first episode, allergist concluded only minor allergy to pet dander. No known cause. 2nd episode occurred while outdoors riding her horse. This episode was more severe with faster progression of symptoms. She took 2 Benadryl and went to the ER with 911 on the phone while driving. She was treated with steroids and epinephrine with resolution of symptoms over a couple hours.    Patient reports a similar episode approx 7 yrs ago. PMHx: Chronic pain, Depression, Fibrocystic breast  PSurgHx: None aside from bilat breast biopsy and wisdom teeth extraction  SHx: Quit smoking 1/2000. Drinks 5 glasses of wine per week. FHx: Mother had breast cancer age 61. Father had CVA. Review of Systems: A complete review of systems was obtained, negative except as described above. Physical Exam:     Visit Vitals  /84 (BP 1 Location: Right upper arm, BP Patient Position: Sitting, BP Cuff Size: Adult)   Pulse 84   Temp 97.6 °F (36.4 °C) (Oral)   Resp 18   Ht 5' 7\" (1.702 m)   Wt 154 lb (69.9 kg)   SpO2 95%   BMI 24.12 kg/m²         ECOG PS: 0  General: no distress  Eyes: anicteric sclerae  HENT: oropharynx clear  Neck: supple  Lymphatic: no cervical, axillary adenopathy; prior R inguinal LN and L supraclavicular LN no longer evident  Respiratory: normal respiratory effort  CV: no peripheral edema  GI: soft, nontender, nondistended, no masses  Skin: no rashes; no ecchymoses; no petechiae      Results:     Lab Results   Component Value Date/Time    WBC 4.2 09/30/2022 12:18 PM    HGB 12.9 09/30/2022 12:18 PM    HCT 38.3 09/30/2022 12:18 PM    PLATELET 544 85/55/2268 12:18 PM    MCV 99 (H) 09/30/2022 12:18 PM    ABS. NEUTROPHILS 3.1 09/30/2022 12:18 PM     Lab Results   Component Value Date/Time    Sodium 139 09/30/2022 12:18 PM    Potassium 4.9 09/30/2022 12:18 PM    Chloride 100 09/30/2022 12:18 PM    CO2 22 09/30/2022 12:18 PM    Glucose 81 09/30/2022 12:18 PM    BUN 13 09/30/2022 12:18 PM    Creatinine 0.83 09/30/2022 12:18 PM    GFR est AA >60 06/30/2022 10:32 AM    GFR est non-AA >60 06/30/2022 10:32 AM    Calcium 9.8 09/30/2022 12:18 PM    Glucose (POC) 104 06/28/2022 07:19 AM     Lab Results   Component Value Date/Time    Bilirubin, total 0.5 09/30/2022 12:18 PM    ALT (SGPT) 17 09/30/2022 12:18 PM    Alk.  phosphatase 110 09/30/2022 12:18 PM    Protein, total 7.1 09/30/2022 12:18 PM    Albumin 4.8 09/30/2022 12:18 PM    Globulin 2.9 06/30/2022 10:32 AM     No results found for: IRON, FE, TIBC, IBCT, PSAT, FERR    No results found for: B12LT, FOL, RBCF  Lab Results   Component Value Date/Time    TSH 1.37 06/30/2022 10:32 AM     Lab Results   Component Value Date/Time    Hepatitis B surface Ag 0.40 01/06/2022 09:13 AM    Hepatitis B surface Ab <3.10 01/06/2022 09:13 AM    Hep B Core Ab, total Negative 01/06/2022 09:13 AM     12/2/21 LD: 170    Imaging:     Chest CT  1/24/20:  FINDINGS:  The normal-sized axillary lymph nodes are unchanged. THYROID: No nodule. MEDIASTINUM: No mass or lymphadenopathy. MARCE: No mass or lymphadenopathy. THORACIC AORTA: No aneurysm. MAIN PULMONARY ARTERY: Normal in caliber. TRACHEA/BRONCHI: Patent. ESOPHAGUS: No wall thickening or dilatation. HEART: Normal in size. PLEURA: No effusion or pneumothorax. LUNGS: Laterally in the left lower lobe on image 53 is a 2 to 3 mm pulmonary  nodule which is unchanged when compared with the examination of 6/27/2017. This  is a benign nodule and not significant. .  There are normal size lymph nodes adjacent to the descending thoracic aorta and  paraspinal region which have increased in size but remain normal in size. .  Abdomen and pelvis: There has been interval development of adenopathy. In the portacaval space there  is a lymph node that measures 17 mm in short axis. There are retroperitoneal  lymph nodes that are enlarged as well. The largest lymph node in the  retroperitoneum is posterior to the inferior vena cava beginning at the level of  the right renal artery this extends inferiorly. This measures 2.4 x 1.0 x 4.0  cm. There are also enlarged lymph nodes in the left aortic region largest  measuring 12 mm in short axis.   There are also lymph nodes posterior to the head of the pancreas and anterior to  the inferior vena cava which are enlarged the largest measures 10 mm in short  axis  There are enlarged lymph nodes in the interaortocaval space. Enlarged lymph  nodes along the right common iliac artery largest measuring approximately 12 mm  in short axis. There are enlarged lymph nodes in the right external iliac chain  largest measuring 9 mm. There is an enlarged lymph node along the right pelvic sidewall measuring 2.5 x  1.3 cm. There are postsurgical changes in the right inguinal region with one  tiny locule of air. There are residual lymph nodes in the right inguinal largest  measuring 10 mm. .  The uterus images normally. There is a 1 cm cyst in the right ovary. There is no free fluid. Review of the bone windows reveals no destructive lesions. The liver and gallbladder are unremarkable. The spleen is normal in size and  configuration. The pancreas and adrenal glands and kidneys are unremarkable. The aorta and inferior vena cava are unremarkable. IMPRESSION:  1. There is is adenopathy as described. There is adenopathy in the right  inguinal region, along the right pelvic sidewall, along the right iliac chain,  bilateral retroperitoneum, and in the portacaval space as well as posterior to  the pancreas head. 2. In the posterior mediastinum adjacent to the descending thoracic aorta are  lymph nodes that measure less than 1 cm in size but these have increased in the  interval.  3. Please see above text    PET 2/10/20:   FINDINGS:  HEAD/NECK: No apparent foci of abnormal hypermetabolism. Cerebral evaluation is  limited by normal intense activity. CHEST: Mildly hypermetabolic left supraclavicular lymph node, maximum SUV 4.2. ABDOMEN/PELVIS: Mildly hypermetabolic portacaval, aortocaval, and left  para-aortic lymph nodes are noted, maximum SUV 5.5 of an aortocaval lymph node. Hypermetabolic right common iliac, right external iliac, right obturator, and  right inguinal lymph nodes. SKELETON: No foci of abnormal hypermetabolism in the axial and visualized  appendicular skeleton.   IMPRESSION: Mildly hypermetabolic left supraclavicular, abdominal,  retroperitoneal, and pelvic lymph nodes. Ch/abd/pelvis CT 5/7/20:  Lymph nodes in chest: There is no new mediastinal, hilar or axillary lymphadenopathy. Subcentimeter bilateral axillary lymph nodes are unchanged. Subcentimeter  posterior mediastinal lymph node abutting the descending thoracic aorta is  unchanged compared to prior CT dated January 2020. 1.3 cm x 1.1 cm left  supraclavicular lymph node is unchanged compared to prior CT dated January 2020. Lymph nodes in abdom/pelvis: There is a prominent portacaval lymph node measuring approximately  1.6 cm x 2.3 cm, unchanged compared to prior CT dated January 2020. There are multiple prominent and mildly enlarged retroperitoneal lymph nodes, unchanged  compared to prior CT dated January 2020. For example, there is a left  para-aortic retroperitoneal lymph node measures 1.2 cm x 1.6 cm, unchanged  compared to prior CT dated January 2020. As an additional example, there is a 9  mm x 1.3 cm aortocaval lymph node, unchanged compared to prior CT dated January 2020. Right iliac lymph nodes are unchanged compared to prior CT dated January 2020. For example, the largest right iliac lymph node measures 1.1 cm x 1.4 cm,  unchanged compared to prior CT dated January 2020. Right internal obturator  lymph node is unchanged compared to prior CT dated January 2020) measuring  approximately 2.8 cm x 1.4 cm. Prominent right inguinal lymph nodes are not  significantly changed in size. For example, there is a 1 cm x 1.4 cm right  inguinal lymph node, unchanged compared to prior CT dated January 2020. IMPRESSION: No interval change. Discussed with radiology - the right RP LN near R renal artery is unchanged. The 4cm dimension is craniocaudal.    11/9/20 CT c/a/p:  FINDINGS:   LYMPH NODES: There is a prominent portacaval lymph node measuring approximately  1.8 x 2.0 cm, not significantly changed from prior measurements of 1.6 cm x 2.3  cm.  There are  multiple prominent and mildly enlarged retroperitoneal lymph nodes, not  significantly changed from prior. For example, there is a left para-aortic  retroperitoneal lymph node that measures 1.1 x 1.7 cm, not significantly changed  from prior measurements of 1.2 x 1.6 and a 10 x 14 mm aortocaval lymph node, not  significantly changed from prior measurements of 9 x 13 mm. Right iliac lymph  nodes are little changed, though the largest has slightly increased in size  measuring 1.3 x 1.6 cm, previously 1.1 x 1.4 cm. Right internal obturator is not  significantly changed measuring 1.1 x 2.7 cm, previously 1.3 x 3.0 cm. Prominent  right inguinal lymph nodes are not significantly changed in size measuring 1.0 x  1.4 cm. IMPRESSION: No significant interval change apart from a single right common  iliac pelvic lymph nodes which shows slight increase in size by measurement from  1.1 x 1.4 cm to 1.3 x 1.6 cm.    11/18/21 CT ch/abd/pelvis:  LYMPH NODES: Extensive increased retroperitoneal and portacaval adenopathy. 3-67 aortocaval node 25 x 36 mm previously 18 x 9 mm.  3-71 left retroperitoneal node 31 x 26 mm on the previous exam 17 x 11 mm. Right lower quadrant mesenteric adenopathy 3-78 25 x 30 mm previously 14 x 10  mm. VASCULATURE: No aneurysm or dissection. REPRODUCTIVE ORGANS: Uterus and ovaries are unremarkable to  URINARY BLADDER: No mass or calculus. BONES: No destructive bone lesion. ABDOMINAL WALL: No mass or hernia. ADDITIONAL COMMENTS: N/A  IMPRESSION  Imaging findings are consistent with interval progression of disease. Extensive increased mesenteric and retroperitoneal lymphadenopathy with index  lesion measurements as described above. 12/10/21 PET:  FINDINGS:  HEAD/NECK: No apparent foci of abnormal hypermetabolism. Cerebral evaluation is  limited by normal intense activity. CHEST: Left supraclavicular adenopathy SUV 7.5, previous 4.2. New left internal  mammary SUV 3.3.  Right diaphragmatic node with SUV 5.  ABDOMEN/PELVIS: Extensive new hypermetabolic and enlarged nodes: upper abdomen  (SUV 9), mesentery, retrocrural, retroperitoneal, right pelvic (SUV 6) and right  groin (SUV 4.5). A previously measured left aortic node is SUV 6.4, previous  5.5. No splenic activity. SKELETON: No foci of abnormal hypermetabolism in the axial and visualized  appendicular skeleton. IMPRESSION  New/progressed multi level bill disease (Deauville 5). Each FDG-avid (or previously FDG avid) lesion is rated independently. Reference values:  Mediastinal blood pool: 1.9 SUV  Liver (background): 2.2 SUV    2/17/22 CXR  FINDINGS: The cardiac silhouette is normal in size. There is hyperaeration of  the lungs. There is no evidence of active lung disease. There is evidence of  mild, mid thoracic scoliosis convex to the right. IMPRESSION  Evidence of pulmonary hyperaeration. No evidence of active lung disease. 3/29/22 PET:   FINDINGS:  HEAD/NECK: Maxillary activity is again noted and likely related to underlying  dental disease. Cerebral evaluation is limited by normal intense activity. CHEST: No foci of abnormal hypermetabolism. Resolved left supraclavicular, and  LC, mediastinal, and right cardiophrenic angle lymph node activity. ABDOMEN/PELVIS: No foci of abnormal hypermetabolism. Resolved abdominal,  retrocrural, retroperitoneal, pelvic, and inguinal lymph node activity. SKELETON: Mildly hypermetabolic healing left anterior rib fractures are noted. IMPRESSION  Resolution of all previously described areas of lymph node activity  (all previously described lesions now Deauville 1). Increased tracer activity  corresponds to healing left anterior rib fractures. Maxillary activity likely  related to dental disease. 6/28/22 PET:  IMPRESSION  No Foci of Abnormal Hypermetabolism. Deauville 1    12/12/22 CT ch/abd/pelvis:  IMPRESSION  Stable examination.   There is no evidence of metastatic/recurrent disease in the chest, abdomen or pelvis. Extensive mesenteric and retroperitoneal lymphadenopathy demonstrated on CT  11/18/2021 is resolved. Assessment & Plan:   Kristal Allen is a 47 y.o. female with Depression comes in for evaluation and management of Follicular lymphoma. 1. Follicular lymphoma / Fatigue:   Stage III (based on L supraclav and abd/pelvis LAD). Normal LDH and no cytopenias or B symptoms present. Discussed that this is an indolent lymphoma which does not necessarily recommend treatment at time of diagnosis. Patients are observed with H&P and labs every 3-6 months. Treatment is for those who develop B symptoms, cytopenias or bulky disease (LN >7cm). Patient was observed for several months. CT 11/2021 and PET 12/2021 showed interval progression of mesenteric and retroperitoneal LAD, coinciding with patient's worsening fatigue. Therefore, patient was treated with BR regimen x 6 cycles. End of treatment PET showed CR in 6/2022. NCCN surveillance: H&P and labs every 3-6 mo for 5 y and then annually or as clinically indicated. Surveillance imaging: up to 2 y post completion of treatment: C/A/P CT scan with contrast no more than every 6 mo; >2 y CT no more than annually. -- Follow up CT result  -- CT of neck, chest, abd/pelvis in 6 months. -- Repeat CT 6 mo, due 6/2023   -- Return in 12 weeks [facility] labs, MD visit. -- Asked pt to follow up with Allergy & Immunology. Call Dr. Valeria Boston ([facility]) [phone] and/or Dr. Chico Lechuga. 2. Acquired angioedema:  Can be linked with lymphoma but no evidence of lymphoma on chest/abd/pelvis CT.   -- Checking complement levels for diagnosis. 3. Depression / ADHD / Anxiety: On Buproprion, Fluoxetine, Vyvanse. 4. Vaccine counseling:  Received COVID vaccines and booster. Received Evusheld 6/29/22.    -- Advised seasonal influenza vaccine now and COVID booster around 12/2022.     5. Fatigue / sleep apnea / macrocytosis:   Fatigue improved with use of CPAP.   -- Check B12, folate with next set of labs. 6. Osteoporosis:  Seen on recent DEXA. On vitamin D supplementation, avoid calcium with history of hypercalcemia. Discussed 3 servings of calcium daily. -- Advised follow up with PCP to discuss bisphosphonates. Emotional well being: Pt is coping well with his/her disease and has excellent support. I appreciate the opportunity to participate in Ms. Israel Seip care.     Signed By: Jenn Chung MD     December 13, 2022

## 2022-12-13 ENCOUNTER — OFFICE VISIT (OUTPATIENT)
Dept: ONCOLOGY | Age: 52
End: 2022-12-13
Payer: COMMERCIAL

## 2022-12-13 ENCOUNTER — HOSPITAL ENCOUNTER (OUTPATIENT)
Dept: INFUSION THERAPY | Age: 52
Discharge: HOME | End: 2022-12-13
Payer: COMMERCIAL

## 2022-12-13 VITALS
WEIGHT: 154 LBS | SYSTOLIC BLOOD PRESSURE: 132 MMHG | RESPIRATION RATE: 18 BRPM | TEMPERATURE: 97.6 F | OXYGEN SATURATION: 95 % | HEART RATE: 84 BPM | DIASTOLIC BLOOD PRESSURE: 84 MMHG | HEIGHT: 67 IN | BODY MASS INDEX: 24.17 KG/M2

## 2022-12-13 DIAGNOSIS — T78.3XXA ANGIOEDEMA, INITIAL ENCOUNTER: ICD-10-CM

## 2022-12-13 DIAGNOSIS — C82.13 FOLLICULAR LYMPHOMA GRADE II OF INTRA-ABDOMINAL LYMPH NODES (HCC): Primary | ICD-10-CM

## 2022-12-13 DIAGNOSIS — R06.02 SOB (SHORTNESS OF BREATH): Primary | ICD-10-CM

## 2022-12-13 DIAGNOSIS — C82.13 FOLLICULAR LYMPHOMA GRADE II OF INTRA-ABDOMINAL LYMPH NODES (HCC): ICD-10-CM

## 2022-12-13 DIAGNOSIS — R53.82 CHRONIC FATIGUE: ICD-10-CM

## 2022-12-13 LAB
ALBUMIN SERPL-MCNC: 4.1 G/DL (ref 3.5–5)
ALBUMIN/GLOB SERPL: 1.4 {RATIO} (ref 1.1–2.2)
ALP SERPL-CCNC: 81 U/L (ref 45–117)
ALT SERPL-CCNC: 34 U/L (ref 12–78)
ANION GAP SERPL CALC-SCNC: 5 MMOL/L (ref 5–15)
AST SERPL-CCNC: 27 U/L (ref 15–37)
BASOPHILS # BLD: 0 K/UL (ref 0–0.1)
BASOPHILS NFR BLD: 1 % (ref 0–1)
BILIRUB SERPL-MCNC: 0.8 MG/DL (ref 0.2–1)
BUN SERPL-MCNC: 8 MG/DL (ref 6–20)
BUN/CREAT SERPL: 10 (ref 12–20)
CALCIUM SERPL-MCNC: 10 MG/DL (ref 8.5–10.1)
CHLORIDE SERPL-SCNC: 105 MMOL/L (ref 97–108)
CO2 SERPL-SCNC: 30 MMOL/L (ref 21–32)
CREAT SERPL-MCNC: 0.79 MG/DL (ref 0.55–1.02)
DIFFERENTIAL METHOD BLD: ABNORMAL
EOSINOPHIL # BLD: 0.1 K/UL (ref 0–0.4)
EOSINOPHIL NFR BLD: 2 % (ref 0–7)
ERYTHROCYTE [DISTWIDTH] IN BLOOD BY AUTOMATED COUNT: 12 % (ref 11.5–14.5)
FOLATE SERPL-MCNC: 7.3 NG/ML (ref 5–21)
GLOBULIN SER CALC-MCNC: 3 G/DL (ref 2–4)
GLUCOSE SERPL-MCNC: 98 MG/DL (ref 65–100)
HCT VFR BLD AUTO: 37 % (ref 35–47)
HGB BLD-MCNC: 12.6 G/DL (ref 11.5–16)
IMM GRANULOCYTES # BLD AUTO: 0 K/UL (ref 0–0.04)
IMM GRANULOCYTES NFR BLD AUTO: 1 % (ref 0–0.5)
LDH SERPL L TO P-CCNC: 137 U/L (ref 81–246)
LYMPHOCYTES # BLD: 0.5 K/UL (ref 0.8–3.5)
LYMPHOCYTES NFR BLD: 13 % (ref 12–49)
MCH RBC QN AUTO: 33.2 PG (ref 26–34)
MCHC RBC AUTO-ENTMCNC: 34.1 G/DL (ref 30–36.5)
MCV RBC AUTO: 97.6 FL (ref 80–99)
MONOCYTES # BLD: 0.5 K/UL (ref 0–1)
MONOCYTES NFR BLD: 11 % (ref 5–13)
NEUTS SEG # BLD: 3.1 K/UL (ref 1.8–8)
NEUTS SEG NFR BLD: 72 % (ref 32–75)
NRBC # BLD: 0 K/UL (ref 0–0.01)
NRBC BLD-RTO: 0 PER 100 WBC
PLATELET # BLD AUTO: 277 K/UL (ref 150–400)
PMV BLD AUTO: 8.4 FL (ref 8.9–12.9)
POTASSIUM SERPL-SCNC: 3.7 MMOL/L (ref 3.5–5.1)
PROT SERPL-MCNC: 7.1 G/DL (ref 6.4–8.2)
RBC # BLD AUTO: 3.79 M/UL (ref 3.8–5.2)
RBC MORPH BLD: ABNORMAL
SODIUM SERPL-SCNC: 140 MMOL/L (ref 136–145)
VIT B12 SERPL-MCNC: 294 PG/ML (ref 193–986)
WBC # BLD AUTO: 4.2 K/UL (ref 3.6–11)

## 2022-12-13 PROCEDURE — 82746 ASSAY OF FOLIC ACID SERUM: CPT

## 2022-12-13 PROCEDURE — 80053 COMPREHEN METABOLIC PANEL: CPT

## 2022-12-13 PROCEDURE — 86161 COMPLEMENT/FUNCTION ACTIVITY: CPT

## 2022-12-13 PROCEDURE — 86160 COMPLEMENT ANTIGEN: CPT

## 2022-12-13 PROCEDURE — 83615 LACTATE (LD) (LDH) ENZYME: CPT

## 2022-12-13 PROCEDURE — 86332 IMMUNE COMPLEX ASSAY: CPT

## 2022-12-13 PROCEDURE — 82607 VITAMIN B-12: CPT

## 2022-12-13 PROCEDURE — 85025 COMPLETE CBC W/AUTO DIFF WBC: CPT

## 2022-12-13 PROCEDURE — 99214 OFFICE O/P EST MOD 30 MIN: CPT | Performed by: INTERNAL MEDICINE

## 2022-12-13 PROCEDURE — 36415 COLL VENOUS BLD VENIPUNCTURE: CPT

## 2022-12-13 NOTE — PROGRESS NOTES
[facility] Lab Visit:    2500  Pt arrived ambulatory and in no distress, labs drawn 1 stick in left  hand per KK . Departed [facility] ambulatory and in no distress. There were no vitals taken for this visit. Labs available in CC once resulted.

## 2022-12-13 NOTE — PROGRESS NOTES
1. Have you been to the ER, urgent care clinic since your last visit? Hospitalized since your last visit? Yes Misael Gonzalez, 11/29/22, anaphylactic reaction to unknown    2. Have you seen or consulted any other health care providers outside of the [address] [place] [address] since your last visit? Include any pap smears or colon screening. Yes Allergist but did not receive answers as to why she is having anaphylactic reactions.           Visit Vitals  /84 (BP 1 Location: Right upper arm, BP Patient Position: Sitting, BP Cuff Size: Adult)   Pulse 84   Temp 97.6 °F (36.4 °C) (Oral)   Resp 18   Ht 5' 7\" (1.702 m)   Wt 154 lb (69.9 kg)   SpO2 95%   BMI 24.12 kg/m²            Chief Complaint   Patient presents with    Follow-up    Lymphoma

## 2022-12-14 LAB
C1INH FUNCTIONAL/C1INH TOTAL MFR SERPL: >93 %MEAN NORMAL
C1INH SERPL-MCNC: 31 MG/DL (ref 21–39)
C4 SERPL-MCNC: 24 MG/DL (ref 12–38)

## 2022-12-15 LAB — C1Q AG SERPL-SCNC: <1.2 UG EQ/ML

## 2022-12-19 LAB — C2 SERPL-MCNC: 2.4 MG/DL (ref 1.4–3.3)

## 2023-01-09 ENCOUNTER — OFFICE VISIT (OUTPATIENT)
Dept: ONCOLOGY | Age: 53
End: 2023-01-09
Payer: COMMERCIAL

## 2023-01-09 VITALS
DIASTOLIC BLOOD PRESSURE: 80 MMHG | BODY MASS INDEX: 24.96 KG/M2 | OXYGEN SATURATION: 98 % | HEIGHT: 67 IN | HEART RATE: 85 BPM | TEMPERATURE: 97.9 F | RESPIRATION RATE: 20 BRPM | SYSTOLIC BLOOD PRESSURE: 118 MMHG | WEIGHT: 159 LBS

## 2023-01-09 DIAGNOSIS — M81.0 AGE-RELATED OSTEOPOROSIS WITHOUT CURRENT PATHOLOGICAL FRACTURE: ICD-10-CM

## 2023-01-09 DIAGNOSIS — R61 NIGHT SWEATS: ICD-10-CM

## 2023-01-09 DIAGNOSIS — C82.13 FOLLICULAR LYMPHOMA GRADE II OF INTRA-ABDOMINAL LYMPH NODES (HCC): Primary | ICD-10-CM

## 2023-01-09 DIAGNOSIS — Z87.898 HISTORY OF ANGIOEDEMA: ICD-10-CM

## 2023-01-09 PROCEDURE — 99214 OFFICE O/P EST MOD 30 MIN: CPT | Performed by: INTERNAL MEDICINE

## 2023-01-09 NOTE — PROGRESS NOTES
Earline Brewer is a 47 y.o. female follow up for lymphoma. 1. Have you been to the ER, urgent care clinic since your last visit? Hospitalized since your last visit?no    2. Have you seen or consulted any other health care providers outside of the [address] [place] [address] since your last visit? Include any pap smears or colon screening.  no

## 2023-01-09 NOTE — PROGRESS NOTES
[address] [facility] at [facility]  [phone]    Hematology / Oncology Established Visit    Reason for Visit:   Chuck Ledbetter is a 47 y.o. female who is seen for follow up of lymphoma. PCP is Dr. Nichelle Sam. Hematology Oncology Treatment History:     Diagnosis: Follicular lymphoma    Stage: III; FLIPI2 low risk    Pathology:   -1/20/20 Right inguinal LN excision: Follicular lymphoma, grade 1-2, follicular pattern, GO48 positive. Comment:   The LN specimen demonstrates increased follicles, lacking normal germinal centers. IHC markers are performed with good controls. Tumor cells are positive for CD20 and BCL2. CD3 marks background T cells. Cyclin D1 is negative in the lymphoid population. CD21 highlights intact dendritic meshworks. Flow cytometry shows positive expression of CD10 and kappa LC restriction. Ki-67 is pending. Focally there are a few follicles with greater than fifteen centroblasts per HPF; however the majority of follicles have less than 15 centroblasts/HPF, consistent with grade 1-2. Diffuse areas are not seen.    -2/5/20 Bone marrow biopsy: Normocellular marrow with multilineage hematopoiesis and <1% involvement by a CD10 positive B-cell lymphoproliferative disorder. Negative for immunophenotypic or morphologic evidence of dysplasia no increase in blasts. Comment   Flow cytometric analysis reveals a small population of CD10 positive clonal B cells compatible with involvement by the patient's recently diagnosed follicular lymphoma. FISH studies are pending and will be reported. Prior Treatment: Bendamustine-Rituximab x 6 cycles, started 1/2022-5/31/22    Current Treatment: Surveillance    History of Present Illness:   Chuck Ledbetter is a 47 y.o. female who comes in for evaluation of Follicular lymphoma. At presentation, pt reported vague symptoms for several years: h/o pruritus which started 6 yrs prior and finally subsided with Zyrtec.  She saw a hematologist at that time, who told her symptoms might surface as a blood disorder in the future. In 2016, pt saw Gynecology (Dr. Marylin Chávez) for abdominal cramping. Workup normal at time time. In summer of 2019, pt developed fatigue as well as same of the chronic pain in abdominal area. In fall of 2019, she developed memory issues with work. She went to see her Psychiatrist due to these symptoms and switched her from generic Wellbutrin to brand name Wellbutrin. No improvement after 3-4 weeks. She was okay with sleeping during that time, but continued to have severe fatigue out of character for her. She went to see her PCP and noted right inguinal LN enlargement. She went to see a surgeon, Dr. Larry Multani. Ultrasound of that region did demonstrate an enlarged LN. FNA was inconclusive. Core needle biopsy was suspicious for B cell lymphoma. Excisional LN biopsy 9/42/12 revealed follicular lymphoma. CT of chest/abd/pelvis was notable for retroperitoneal, pelvic and portacaval LAD. Pt also reported left hip pain, left foot tingling. No fevers, chills, weight loss. She did have occasional sweats which come and go. Patient underwent staging bone marrow biopsy and PET scan. [facility]. She was told her GI symptoms are not related to lymphoma. Interval History:  Patient comes in for follow up. States she is feeling well aside from night sweats which occur 2-3 times a night. No hot flashes during the day. No menstrual periods in approximately 1 year. Did see her Allergist again for follow up of angioedema and was advised this was idiopathic. No enlarged Lns. PMHx: Chronic pain, Depression, Fibrocystic breast  PSurgHx: None aside from bilat breast biopsy and wisdom teeth extraction  SHx: Quit smoking 1/2000. Drinks 5 glasses of wine per week. FHx: Mother had breast cancer age 61. Father had CVA.    Review of Systems: A complete review of systems was obtained, negative except as described above. Physical Exam:     Visit Vitals  /80 (BP 1 Location: Right arm, BP Patient Position: Sitting, BP Cuff Size: Large adult)   Pulse 85   Temp 97.9 °F (36.6 °C) (Esophageal)   Resp 20   Ht 5' 7\" (1.702 m)   Wt 159 lb (72.1 kg)   SpO2 98%   BMI 24.90 kg/m²         ECOG PS: 0  General: no distress  Eyes: anicteric sclerae  HENT: oropharynx clear  Neck: supple  Lymphatic: no cervical, axillary adenopathy; prior R inguinal LN and L supraclavicular LN no longer evident  Respiratory: normal respiratory effort  CV: no peripheral edema  GI: soft, nontender, nondistended, no masses  Skin: no rashes; no ecchymoses; no petechiae      Results:     Lab Results   Component Value Date/Time    WBC 4.2 12/13/2022 03:46 PM    HGB 12.6 12/13/2022 03:46 PM    HCT 37.0 12/13/2022 03:46 PM    PLATELET 008 09/34/6925 03:46 PM    MCV 97.6 12/13/2022 03:46 PM    ABS. NEUTROPHILS 3.1 12/13/2022 03:46 PM     Lab Results   Component Value Date/Time    Sodium 140 12/13/2022 03:46 PM    Potassium 3.7 12/13/2022 03:46 PM    Chloride 105 12/13/2022 03:46 PM    CO2 30 12/13/2022 03:46 PM    Glucose 98 12/13/2022 03:46 PM    BUN 8 12/13/2022 03:46 PM    Creatinine 0.79 12/13/2022 03:46 PM    GFR est AA >60 06/30/2022 10:32 AM    GFR est non-AA >60 06/30/2022 10:32 AM    Calcium 10.0 12/13/2022 03:46 PM    Glucose (POC) 104 06/28/2022 07:19 AM     Lab Results   Component Value Date/Time    Bilirubin, total 0.8 12/13/2022 03:46 PM    ALT (SGPT) 34 12/13/2022 03:46 PM    Alk.  phosphatase 81 12/13/2022 03:46 PM    Protein, total 7.1 12/13/2022 03:46 PM    Albumin 4.1 12/13/2022 03:46 PM    Globulin 3.0 12/13/2022 03:46 PM     No results found for: IRON, FE, TIBC, IBCT, PSAT, FERR    Lab Results   Component Value Date/Time    Vitamin B12 294 12/13/2022 03:46 PM    Folate 7.3 12/13/2022 03:46 PM     Lab Results   Component Value Date/Time    TSH 1.37 06/30/2022 10:32 AM     Lab Results   Component Value Date/Time    Hepatitis B surface Ag 0.40 01/06/2022 09:13 AM    Hepatitis B surface Ab <3.10 01/06/2022 09:13 AM    Hep B Core Ab, total Negative 01/06/2022 09:13 AM     12/2/21 LD: 170    Imaging:     Chest CT  1/24/20:  FINDINGS:  The normal-sized axillary lymph nodes are unchanged. THYROID: No nodule. MEDIASTINUM: No mass or lymphadenopathy. MARCE: No mass or lymphadenopathy. THORACIC AORTA: No aneurysm. MAIN PULMONARY ARTERY: Normal in caliber. TRACHEA/BRONCHI: Patent. ESOPHAGUS: No wall thickening or dilatation. HEART: Normal in size. PLEURA: No effusion or pneumothorax. LUNGS: Laterally in the left lower lobe on image 53 is a 2 to 3 mm pulmonary  nodule which is unchanged when compared with the examination of 6/27/2017. This  is a benign nodule and not significant. .  There are normal size lymph nodes adjacent to the descending thoracic aorta and  paraspinal region which have increased in size but remain normal in size. .  Abdomen and pelvis: There has been interval development of adenopathy. In the portacaval space there  is a lymph node that measures 17 mm in short axis. There are retroperitoneal  lymph nodes that are enlarged as well. The largest lymph node in the  retroperitoneum is posterior to the inferior vena cava beginning at the level of  the right renal artery this extends inferiorly. This measures 2.4 x 1.0 x 4.0  cm. There are also enlarged lymph nodes in the left aortic region largest  measuring 12 mm in short axis. There are also lymph nodes posterior to the head of the pancreas and anterior to  the inferior vena cava which are enlarged the largest measures 10 mm in short  axis  There are enlarged lymph nodes in the interaortocaval space. Enlarged lymph  nodes along the right common iliac artery largest measuring approximately 12 mm  in short axis. There are enlarged lymph nodes in the right external iliac chain  largest measuring 9 mm.   There is an enlarged lymph node along the right pelvic sidewall measuring 2.5 x  1.3 cm. There are postsurgical changes in the right inguinal region with one  tiny locule of air. There are residual lymph nodes in the right inguinal largest  measuring 10 mm. .  The uterus images normally. There is a 1 cm cyst in the right ovary. There is no free fluid. Review of the bone windows reveals no destructive lesions. The liver and gallbladder are unremarkable. The spleen is normal in size and  configuration. The pancreas and adrenal glands and kidneys are unremarkable. The aorta and inferior vena cava are unremarkable. IMPRESSION:  1. There is is adenopathy as described. There is adenopathy in the right  inguinal region, along the right pelvic sidewall, along the right iliac chain,  bilateral retroperitoneum, and in the portacaval space as well as posterior to  the pancreas head. 2. In the posterior mediastinum adjacent to the descending thoracic aorta are  lymph nodes that measure less than 1 cm in size but these have increased in the  interval.  3. Please see above text    PET 2/10/20:   FINDINGS:  HEAD/NECK: No apparent foci of abnormal hypermetabolism. Cerebral evaluation is  limited by normal intense activity. CHEST: Mildly hypermetabolic left supraclavicular lymph node, maximum SUV 4.2. ABDOMEN/PELVIS: Mildly hypermetabolic portacaval, aortocaval, and left  para-aortic lymph nodes are noted, maximum SUV 5.5 of an aortocaval lymph node. Hypermetabolic right common iliac, right external iliac, right obturator, and  right inguinal lymph nodes. SKELETON: No foci of abnormal hypermetabolism in the axial and visualized  appendicular skeleton. IMPRESSION: Mildly hypermetabolic left supraclavicular, abdominal,  retroperitoneal, and pelvic lymph nodes. Ch/abd/pelvis CT 5/7/20:  Lymph nodes in chest: There is no new mediastinal, hilar or axillary lymphadenopathy. Subcentimeter bilateral axillary lymph nodes are unchanged.  Subcentimeter  posterior mediastinal lymph node abutting the descending thoracic aorta is  unchanged compared to prior CT dated January 2020. 1.3 cm x 1.1 cm left  supraclavicular lymph node is unchanged compared to prior CT dated January 2020. Lymph nodes in abdom/pelvis: There is a prominent portacaval lymph node measuring approximately  1.6 cm x 2.3 cm, unchanged compared to prior CT dated January 2020. There are multiple prominent and mildly enlarged retroperitoneal lymph nodes, unchanged  compared to prior CT dated January 2020. For example, there is a left  para-aortic retroperitoneal lymph node measures 1.2 cm x 1.6 cm, unchanged  compared to prior CT dated January 2020. As an additional example, there is a 9  mm x 1.3 cm aortocaval lymph node, unchanged compared to prior CT dated January 2020. Right iliac lymph nodes are unchanged compared to prior CT dated January 2020. For example, the largest right iliac lymph node measures 1.1 cm x 1.4 cm,  unchanged compared to prior CT dated January 2020. Right internal obturator  lymph node is unchanged compared to prior CT dated January 2020) measuring  approximately 2.8 cm x 1.4 cm. Prominent right inguinal lymph nodes are not  significantly changed in size. For example, there is a 1 cm x 1.4 cm right  inguinal lymph node, unchanged compared to prior CT dated January 2020. IMPRESSION: No interval change. Discussed with radiology - the right RP LN near R renal artery is unchanged. The 4cm dimension is craniocaudal.    11/9/20 CT c/a/p:  FINDINGS:   LYMPH NODES: There is a prominent portacaval lymph node measuring approximately  1.8 x 2.0 cm, not significantly changed from prior measurements of 1.6 cm x 2.3  cm. There are  multiple prominent and mildly enlarged retroperitoneal lymph nodes, not  significantly changed from prior.  For example, there is a left para-aortic  retroperitoneal lymph node that measures 1.1 x 1.7 cm, not significantly changed  from prior measurements of 1.2 x 1.6 and a 10 x 14 mm aortocaval lymph node, not  significantly changed from prior measurements of 9 x 13 mm. Right iliac lymph  nodes are little changed, though the largest has slightly increased in size  measuring 1.3 x 1.6 cm, previously 1.1 x 1.4 cm. Right internal obturator is not  significantly changed measuring 1.1 x 2.7 cm, previously 1.3 x 3.0 cm. Prominent  right inguinal lymph nodes are not significantly changed in size measuring 1.0 x  1.4 cm. IMPRESSION: No significant interval change apart from a single right common  iliac pelvic lymph nodes which shows slight increase in size by measurement from  1.1 x 1.4 cm to 1.3 x 1.6 cm.    11/18/21 CT ch/abd/pelvis:  LYMPH NODES: Extensive increased retroperitoneal and portacaval adenopathy. 3-67 aortocaval node 25 x 36 mm previously 18 x 9 mm.  3-71 left retroperitoneal node 31 x 26 mm on the previous exam 17 x 11 mm. Right lower quadrant mesenteric adenopathy 3-78 25 x 30 mm previously 14 x 10  mm. VASCULATURE: No aneurysm or dissection. REPRODUCTIVE ORGANS: Uterus and ovaries are unremarkable to  URINARY BLADDER: No mass or calculus. BONES: No destructive bone lesion. ABDOMINAL WALL: No mass or hernia. ADDITIONAL COMMENTS: N/A  IMPRESSION  Imaging findings are consistent with interval progression of disease. Extensive increased mesenteric and retroperitoneal lymphadenopathy with index  lesion measurements as described above. 12/10/21 PET:  FINDINGS:  HEAD/NECK: No apparent foci of abnormal hypermetabolism. Cerebral evaluation is  limited by normal intense activity. CHEST: Left supraclavicular adenopathy SUV 7.5, previous 4.2. New left internal  mammary SUV 3.3. Right diaphragmatic node with SUV 5.  ABDOMEN/PELVIS: Extensive new hypermetabolic and enlarged nodes: upper abdomen  (SUV 9), mesentery, retrocrural, retroperitoneal, right pelvic (SUV 6) and right  groin (SUV 4.5). A previously measured left aortic node is SUV 6.4, previous  5.5. No splenic activity.   SKELETON: No foci of abnormal hypermetabolism in the axial and visualized  appendicular skeleton. IMPRESSION  New/progressed multi level bill disease (Deauville 5). Each FDG-avid (or previously FDG avid) lesion is rated independently. Reference values:  Mediastinal blood pool: 1.9 SUV  Liver (background): 2.2 SUV    2/17/22 CXR  FINDINGS: The cardiac silhouette is normal in size. There is hyperaeration of  the lungs. There is no evidence of active lung disease. There is evidence of  mild, mid thoracic scoliosis convex to the right. IMPRESSION  Evidence of pulmonary hyperaeration. No evidence of active lung disease. 3/29/22 PET:   FINDINGS:  HEAD/NECK: Maxillary activity is again noted and likely related to underlying  dental disease. Cerebral evaluation is limited by normal intense activity. CHEST: No foci of abnormal hypermetabolism. Resolved left supraclavicular, and  LC, mediastinal, and right cardiophrenic angle lymph node activity. ABDOMEN/PELVIS: No foci of abnormal hypermetabolism. Resolved abdominal,  retrocrural, retroperitoneal, pelvic, and inguinal lymph node activity. SKELETON: Mildly hypermetabolic healing left anterior rib fractures are noted. IMPRESSION  Resolution of all previously described areas of lymph node activity  (all previously described lesions now Deauville 1). Increased tracer activity  corresponds to healing left anterior rib fractures. Maxillary activity likely  related to dental disease. 6/28/22 PET:  IMPRESSION  No Foci of Abnormal Hypermetabolism. Deauville 1    12/12/22 CT ch/abd/pelvis:  IMPRESSION  Stable examination. There is no evidence of metastatic/recurrent disease in the chest, abdomen or pelvis. Extensive mesenteric and retroperitoneal lymphadenopathy demonstrated on CT  11/18/2021 is resolved. Assessment & Plan:   Jose Oden is a 47 y.o. female with Depression comes in for evaluation and management of Follicular lymphoma.     1. Follicular lymphoma / Fatigue: Stage III (based on L supraclav and abd/pelvis LAD). Normal LDH and no cytopenias or B symptoms present. Discussed that this is an indolent lymphoma which does not necessarily recommend treatment at time of diagnosis. Patients are observed with H&P and labs every 3-6 months. Treatment is for those who develop B symptoms, cytopenias or bulky disease (LN >7cm). Patient was observed for several months. CT 11/2021 and PET 12/2021 showed interval progression of mesenteric and retroperitoneal LAD, coinciding with patient's worsening fatigue. Therefore, patient was treated with BR regimen x 6 cycles. End of treatment PET showed CR in 6/2022. NCCN surveillance: H&P and labs every 3-6 mo for 5 y and then annually or as clinically indicated. Surveillance imaging: up to 2 y post completion of treatment: C/A/P CT scan with contrast no more than every 6 mo; >2 y CT no more than annually. -- CT of neck, chest, abd/pelvis in 6 months. -- Repeat CT 6 mo, due 6/2023   -- Return in 12 weeks [facility] labs, MD visit. 2. Acquired angioedema:  Can be linked with lymphoma but no evidence of lymphoma on chest/abd/pelvis CT. Complement levels were normal when testing in Dec 2022. States her Allergist states this was idiopathic and advised carrying EpiPen with her for emergency as needed use. Was evaluated by Dr. Saulo Nunn ([facility]) [phone] and/or Dr. Kunal Moran. 3. Depression / ADHD / Anxiety: On Buproprion, Fluoxetine, Vyvanse. 4. Vaccine counseling:  Received COVID vaccines and booster. Received Evusheld 6/29/22. Advised seasonal influenza vaccine now and COVID booster around 12/2022.     5. Fatigue / sleep apnea / macrocytosis:   Fatigue improved with use of CPAP. Normal B12, folate. Advised MVN. 6. Osteoporosis:  Seen on recent DEXA. On vitamin D supplementation, avoid calcium with history of hypercalcemia. Discussed 3 servings of calcium daily. -- Advised follow up with PCP to discuss bisphosphonates. 7. Night sweats:  Likely menopause given no menstruation for past year. No evidence of lymphoma on exam or imaging. Emotional well being: Pt is coping well with his/her disease and has excellent support. I appreciate the opportunity to participate in Ms. Emilee Momin care.     Signed By: Niurka Mcginnis MD     January 9, 2023

## 2023-02-08 ENCOUNTER — TELEPHONE (OUTPATIENT)
Dept: ONCOLOGY | Age: 53
End: 2023-02-08

## 2023-02-09 NOTE — TELEPHONE ENCOUNTER
[facility] at [address], [address] [place] [phone]  W: [phone]  F: [phone]    Medical Nutrition Therapy  Nutrition Referral:    Referral received. Called patient, no answer. Left a message, explaining that RD is available to address nutrition throughout the spectrum of care. Contact information provided and [employer] message sent.        Chemotherapy Flowsheet 6/1/2022   Cycle C6D2   Date 6/1/2022   Drug / Regimen Bendamustine   Pre Meds given   Notes given       Signed By: Pamela Curran, [employer]

## 2023-02-15 ENCOUNTER — TELEPHONE (OUTPATIENT)
Dept: ONCOLOGY | Age: 53
End: 2023-02-15

## 2023-02-17 NOTE — TELEPHONE ENCOUNTER
[facility] at [address], [address] [place] [id number]  W: [phone]  F: [phone]    Medical Nutrition Therapy  Nutrition Referral:    Patient returned RD call. Called her back. No answer. Left message.       Signed By: Anais Dawn, [employer]

## 2023-02-22 ENCOUNTER — TELEPHONE (OUTPATIENT)
Dept: ONCOLOGY | Age: 53
End: 2023-02-22

## 2023-02-23 NOTE — TELEPHONE ENCOUNTER
[facility] at [address], [address] [place] [phone]  W: [phone]  F: [phone]    Medical Nutrition Therapy  Nutrition Referral:    Patient called back. She is looking for diet advance and meal assistance. She reports she has been trying to reduce carbohydrates and sugar as they make her feel poorly. She feels better when she does not eat them. She feels she is eating the same things. She would like to schedule appointment and will keep a food diary prior to visit.       Signed By: Rhonda Chávez, [employer]

## 2023-03-07 ENCOUNTER — PATIENT MESSAGE (OUTPATIENT)
Dept: ONCOLOGY | Age: 53
End: 2023-03-07

## 2023-04-22 DIAGNOSIS — C82.13 FOLLICULAR LYMPHOMA GRADE II OF INTRA-ABDOMINAL LYMPH NODES (HCC): Primary | ICD-10-CM

## 2023-04-23 DIAGNOSIS — C82.13 FOLLICULAR LYMPHOMA GRADE II OF INTRA-ABDOMINAL LYMPH NODES (HCC): Primary | ICD-10-CM

## 2023-04-24 DIAGNOSIS — C82.13 FOLLICULAR LYMPHOMA GRADE II OF INTRA-ABDOMINAL LYMPH NODES (HCC): Primary | ICD-10-CM

## 2023-04-28 DIAGNOSIS — C82.13 FOLLICULAR LYMPHOMA GRADE II OF INTRA-ABDOMINAL LYMPH NODES (HCC): Primary | ICD-10-CM

## 2023-05-30 DIAGNOSIS — C82.13 FOLLICULAR LYMPHOMA GRADE II OF INTRA-ABDOMINAL LYMPH NODES (HCC): ICD-10-CM

## 2023-05-30 LAB
ALBUMIN SERPL-MCNC: 4.2 G/DL (ref 3.5–5)
ALBUMIN/GLOB SERPL: 1.4 (ref 1.1–2.2)
ALP SERPL-CCNC: 108 U/L (ref 45–117)
ALT SERPL-CCNC: 24 U/L (ref 12–78)
ANION GAP SERPL CALC-SCNC: 5 MMOL/L (ref 5–15)
AST SERPL-CCNC: 19 U/L (ref 15–37)
BASOPHILS # BLD: 0 K/UL (ref 0–0.1)
BASOPHILS NFR BLD: 1 % (ref 0–1)
BILIRUB SERPL-MCNC: 0.6 MG/DL (ref 0.2–1)
BUN SERPL-MCNC: 10 MG/DL (ref 6–20)
BUN/CREAT SERPL: 12 (ref 12–20)
CALCIUM SERPL-MCNC: 9.7 MG/DL (ref 8.5–10.1)
CHLORIDE SERPL-SCNC: 106 MMOL/L (ref 97–108)
CO2 SERPL-SCNC: 31 MMOL/L (ref 21–32)
CREAT SERPL-MCNC: 0.83 MG/DL (ref 0.55–1.02)
DIFFERENTIAL METHOD BLD: ABNORMAL
EOSINOPHIL # BLD: 0.1 K/UL (ref 0–0.4)
EOSINOPHIL NFR BLD: 2 % (ref 0–7)
ERYTHROCYTE [DISTWIDTH] IN BLOOD BY AUTOMATED COUNT: 11.9 % (ref 11.5–14.5)
GLOBULIN SER CALC-MCNC: 2.9 G/DL (ref 2–4)
GLUCOSE SERPL-MCNC: 63 MG/DL (ref 65–100)
HCT VFR BLD AUTO: 42.3 % (ref 35–47)
HGB BLD-MCNC: 13.8 G/DL (ref 11.5–16)
IMM GRANULOCYTES # BLD AUTO: 0 K/UL
IMM GRANULOCYTES NFR BLD AUTO: 0 %
LDH SERPL L TO P-CCNC: 158 U/L (ref 81–246)
LYMPHOCYTES # BLD: 0.8 K/UL (ref 0.8–3.5)
LYMPHOCYTES NFR BLD: 26 % (ref 12–49)
MCH RBC QN AUTO: 32.2 PG (ref 26–34)
MCHC RBC AUTO-ENTMCNC: 32.6 G/DL (ref 30–36.5)
MCV RBC AUTO: 98.8 FL (ref 80–99)
MONOCYTES # BLD: 0.5 K/UL (ref 0–1)
MONOCYTES NFR BLD: 15 % (ref 5–13)
NEUTS SEG # BLD: 1.7 K/UL (ref 1.8–8)
NEUTS SEG NFR BLD: 56 % (ref 32–75)
NRBC # BLD: 0 K/UL (ref 0–0.01)
NRBC BLD-RTO: 0 PER 100 WBC
PLATELET # BLD AUTO: 318 K/UL (ref 150–400)
PMV BLD AUTO: 8.8 FL (ref 8.9–12.9)
POTASSIUM SERPL-SCNC: 4.1 MMOL/L (ref 3.5–5.1)
PROT SERPL-MCNC: 7.1 G/DL (ref 6.4–8.2)
RBC # BLD AUTO: 4.28 M/UL (ref 3.8–5.2)
RBC MORPH BLD: ABNORMAL
SODIUM SERPL-SCNC: 142 MMOL/L (ref 136–145)
WBC # BLD AUTO: 3.1 K/UL (ref 3.6–11)
WBC MORPH BLD: ABNORMAL

## 2023-06-02 ENCOUNTER — HOSPITAL ENCOUNTER (OUTPATIENT)
Facility: HOSPITAL | Age: 53
Discharge: HOME | End: 2023-06-02
Payer: COMMERCIAL

## 2023-06-02 DIAGNOSIS — C82.13 FOLLICULAR LYMPHOMA GRADE II OF INTRA-ABDOMINAL LYMPH NODES (HCC): ICD-10-CM

## 2023-06-02 PROCEDURE — 6360000004 HC RX CONTRAST MEDICATION: Performed by: NURSE PRACTITIONER

## 2023-06-02 PROCEDURE — 71260 CT THORAX DX C+: CPT

## 2023-06-02 PROCEDURE — 70491 CT SOFT TISSUE NECK W/DYE: CPT

## 2023-06-02 RX ADMIN — IOPAMIDOL 100 ML: 755 INJECTION, SOLUTION INTRAVENOUS at 17:27

## 2023-06-09 ENCOUNTER — OFFICE VISIT (OUTPATIENT)
Age: 53
End: 2023-06-09
Payer: COMMERCIAL

## 2023-06-09 VITALS
HEIGHT: 67 IN | RESPIRATION RATE: 18 BRPM | SYSTOLIC BLOOD PRESSURE: 118 MMHG | TEMPERATURE: 98.4 F | HEART RATE: 83 BPM | OXYGEN SATURATION: 96 % | DIASTOLIC BLOOD PRESSURE: 80 MMHG | WEIGHT: 146.4 LBS | BODY MASS INDEX: 22.98 KG/M2

## 2023-06-09 DIAGNOSIS — K58.0 IRRITABLE BOWEL SYNDROME WITH DIARRHEA: ICD-10-CM

## 2023-06-09 DIAGNOSIS — T45.1X5A CHEMOTHERAPY-INDUCED NEUTROPENIA (HCC): ICD-10-CM

## 2023-06-09 DIAGNOSIS — C82.13 FOLLICULAR LYMPHOMA GRADE II, INTRA-ABDOMINAL LYMPH NODES (HCC): Primary | ICD-10-CM

## 2023-06-09 DIAGNOSIS — D75.89 MACROCYTOSIS WITHOUT ANEMIA: ICD-10-CM

## 2023-06-09 DIAGNOSIS — D70.1 CHEMOTHERAPY-INDUCED NEUTROPENIA (HCC): ICD-10-CM

## 2023-06-09 PROCEDURE — 99214 OFFICE O/P EST MOD 30 MIN: CPT | Performed by: INTERNAL MEDICINE

## 2023-06-09 ASSESSMENT — PATIENT HEALTH QUESTIONNAIRE - PHQ9
2. FEELING DOWN, DEPRESSED OR HOPELESS: 0
SUM OF ALL RESPONSES TO PHQ QUESTIONS 1-9: 0
SUM OF ALL RESPONSES TO PHQ9 QUESTIONS 1 & 2: 0
SUM OF ALL RESPONSES TO PHQ QUESTIONS 1-9: 0
1. LITTLE INTEREST OR PLEASURE IN DOING THINGS: 0
SUM OF ALL RESPONSES TO PHQ QUESTIONS 1-9: 0
SUM OF ALL RESPONSES TO PHQ QUESTIONS 1-9: 0

## 2023-06-09 NOTE — PROGRESS NOTES
[address] [facility] at [address] [place] [address]  [phone]    Hematology / Oncology Established Visit    Reason for Visit:   Khadijah Torres is a 47 y.o. female who is seen for follow up of lymphoma. PCP is Dr. Jennie Finn. Hematology Oncology Treatment History:     Diagnosis: Follicular lymphoma    Stage: III; FLIPI2 low risk    Pathology:   -1/20/20 Right inguinal LN excision: Follicular lymphoma, grade 1-2, follicular pattern, NJ72 positive. Comment:   The LN specimen demonstrates increased follicles, lacking normal germinal centers. IHC markers are performed with good controls. Tumor cells are positive for CD20 and BCL2. CD3 marks background T cells. Cyclin D1 is negative in the lymphoid population. CD21 highlights intact dendritic meshworks. Flow cytometry shows positive expression of CD10 and kappa LC restriction. Ki-67 is pending. Focally there are a few follicles with greater than fifteen centroblasts per HPF; however the majority of follicles have less than 15 centroblasts/HPF, consistent with grade 1-2. Diffuse areas are not seen.    -2/5/20 Bone marrow biopsy: Normocellular marrow with multilineage hematopoiesis and <1% involvement by a CD10 positive B-cell lymphoproliferative disorder. Negative for immunophenotypic or morphologic evidence of dysplasia no increase in blasts. Comment   Flow cytometric analysis reveals a small population of CD10 positive clonal B cells compatible with involvement by the patient's recently diagnosed follicular lymphoma. FISH studies are pending and will be reported. Prior Treatment: Bendamustine-Rituximab x 6 cycles, started 1/2022-5/31/22    Current Treatment: Surveillance    History of Present Illness:   Khadijah Torres is a 47 y.o. female who comes in for evaluation of Follicular lymphoma. At presentation, pt reported vague symptoms for several years: h/o pruritus which started 6 yrs prior and finally subsided with Zyrtec.  She saw

## 2023-06-09 NOTE — PROGRESS NOTES
Chief Complaint   Patient presents with    Follow-up           Vitals:    06/09/23 0923   BP: 118/80   Pulse: 83   Resp: 18   Temp: 98.4 °F (36.9 °C)   SpO2: 96%            1. Have you been to the ER, urgent care clinic since your last visit? Hospitalized since your last visit? No  2. Have you seen or consulted any other health care providers outside of the [address] [place] [address] since your last visit? Include any pap smears or colon screening.  No

## 2023-08-03 ENCOUNTER — APPOINTMENT (OUTPATIENT)
Facility: HOSPITAL | Age: 53
End: 2023-08-03
Payer: COMMERCIAL

## 2023-08-03 ENCOUNTER — HOSPITAL ENCOUNTER (EMERGENCY)
Facility: HOSPITAL | Age: 53
Discharge: HOME | End: 2023-08-03
Attending: EMERGENCY MEDICINE
Payer: COMMERCIAL

## 2023-08-03 VITALS
HEIGHT: 67 IN | WEIGHT: 145 LBS | RESPIRATION RATE: 17 BRPM | BODY MASS INDEX: 22.76 KG/M2 | SYSTOLIC BLOOD PRESSURE: 127 MMHG | TEMPERATURE: 97.8 F | DIASTOLIC BLOOD PRESSURE: 80 MMHG | OXYGEN SATURATION: 100 % | HEART RATE: 86 BPM

## 2023-08-03 DIAGNOSIS — R42 LIGHTHEADEDNESS: Primary | ICD-10-CM

## 2023-08-03 LAB
ALBUMIN SERPL-MCNC: 4.3 G/DL (ref 3.5–5)
ALBUMIN/GLOB SERPL: 1.4 (ref 1.1–2.2)
ALP SERPL-CCNC: 100 U/L (ref 45–117)
ALT SERPL-CCNC: 21 U/L (ref 12–78)
ANION GAP SERPL CALC-SCNC: 8 MMOL/L (ref 5–15)
AST SERPL-CCNC: 17 U/L (ref 15–37)
BASOPHILS # BLD: 0 K/UL (ref 0–0.1)
BASOPHILS NFR BLD: 1 % (ref 0–1)
BILIRUB SERPL-MCNC: 0.5 MG/DL (ref 0.2–1)
BUN SERPL-MCNC: 11 MG/DL (ref 6–20)
BUN/CREAT SERPL: 12 (ref 12–20)
CALCIUM SERPL-MCNC: 9.9 MG/DL (ref 8.5–10.1)
CHLORIDE SERPL-SCNC: 103 MMOL/L (ref 97–108)
CO2 SERPL-SCNC: 29 MMOL/L (ref 21–32)
CREAT SERPL-MCNC: 0.95 MG/DL (ref 0.55–1.02)
DIFFERENTIAL METHOD BLD: NORMAL
EOSINOPHIL # BLD: 0.1 K/UL (ref 0–0.4)
EOSINOPHIL NFR BLD: 3 % (ref 0–7)
ERYTHROCYTE [DISTWIDTH] IN BLOOD BY AUTOMATED COUNT: 11.5 % (ref 11.5–14.5)
GLOBULIN SER CALC-MCNC: 3.1 G/DL (ref 2–4)
GLUCOSE SERPL-MCNC: 102 MG/DL (ref 65–100)
HCT VFR BLD AUTO: 37.6 % (ref 35–47)
HGB BLD-MCNC: 12.8 G/DL (ref 11.5–16)
IMM GRANULOCYTES # BLD AUTO: 0 K/UL (ref 0–0.04)
IMM GRANULOCYTES NFR BLD AUTO: 0 % (ref 0–0.5)
LYMPHOCYTES # BLD: 1.2 K/UL (ref 0.8–3.5)
LYMPHOCYTES NFR BLD: 29 % (ref 12–49)
MAGNESIUM SERPL-MCNC: 2.1 MG/DL (ref 1.6–2.4)
MCH RBC QN AUTO: 32.8 PG (ref 26–34)
MCHC RBC AUTO-ENTMCNC: 34 G/DL (ref 30–36.5)
MCV RBC AUTO: 96.4 FL (ref 80–99)
MONOCYTES # BLD: 0.5 K/UL (ref 0–1)
MONOCYTES NFR BLD: 12 % (ref 5–13)
NEUTS SEG # BLD: 2.4 K/UL (ref 1.8–8)
NEUTS SEG NFR BLD: 55 % (ref 32–75)
NRBC # BLD: 0 K/UL (ref 0–0.01)
NRBC BLD-RTO: 0 PER 100 WBC
PLATELET # BLD AUTO: 276 K/UL (ref 150–400)
PMV BLD AUTO: 8.9 FL (ref 8.9–12.9)
POTASSIUM SERPL-SCNC: 4.1 MMOL/L (ref 3.5–5.1)
PROT SERPL-MCNC: 7.4 G/DL (ref 6.4–8.2)
RBC # BLD AUTO: 3.9 M/UL (ref 3.8–5.2)
SODIUM SERPL-SCNC: 140 MMOL/L (ref 136–145)
TROPONIN I SERPL HS-MCNC: 4 NG/L (ref 0–51)
WBC # BLD AUTO: 4.3 K/UL (ref 3.6–11)

## 2023-08-03 PROCEDURE — 80053 COMPREHEN METABOLIC PANEL: CPT

## 2023-08-03 PROCEDURE — 36415 COLL VENOUS BLD VENIPUNCTURE: CPT

## 2023-08-03 PROCEDURE — 93005 ELECTROCARDIOGRAM TRACING: CPT | Performed by: EMERGENCY MEDICINE

## 2023-08-03 PROCEDURE — 71045 X-RAY EXAM CHEST 1 VIEW: CPT

## 2023-08-03 PROCEDURE — 84484 ASSAY OF TROPONIN QUANT: CPT

## 2023-08-03 PROCEDURE — 85025 COMPLETE CBC W/AUTO DIFF WBC: CPT

## 2023-08-03 PROCEDURE — 2580000003 HC RX 258: Performed by: EMERGENCY MEDICINE

## 2023-08-03 PROCEDURE — 83735 ASSAY OF MAGNESIUM: CPT

## 2023-08-03 PROCEDURE — 99284 EMERGENCY DEPT VISIT MOD MDM: CPT

## 2023-08-03 RX ORDER — 0.9 % SODIUM CHLORIDE 0.9 %
1000 INTRAVENOUS SOLUTION INTRAVENOUS ONCE
Status: COMPLETED | OUTPATIENT
Start: 2023-08-03 | End: 2023-08-03

## 2023-08-03 RX ADMIN — SODIUM CHLORIDE 1000 ML: 9 INJECTION, SOLUTION INTRAVENOUS at 12:02

## 2023-08-03 ASSESSMENT — LIFESTYLE VARIABLES
HOW OFTEN DO YOU HAVE A DRINK CONTAINING ALCOHOL: NEVER
HOW MANY STANDARD DRINKS CONTAINING ALCOHOL DO YOU HAVE ON A TYPICAL DAY: PATIENT DOES NOT DRINK

## 2023-08-03 NOTE — ED TRIAGE NOTES
[facility] [address] [facility]. Pt states \"around 15 min prior to arrival I was driving on [address] I suddenly felt pain/pressure in my right lower jaw and neck with lightheadedness so I pulled over and called 911. I feel faint right now. \"

## 2023-08-03 NOTE — ED NOTES
The patient was discharged home by Dr Tomer Cardenas in stable condition. The patient is alert and oriented, in no respiratory distress and discharge vital signs obtained. The patient's diagnosis, condition and treatment were explained. The patient expressed understanding. No prescriptions given/e-scribed to pharmacy. No work/school note given. A discharge plan has been developed. A  was not involved in the process. Aftercare instructions were given. Pt ambulatory out of the ED.         Anthony Gandhi RN  08/03/23 0379

## 2023-08-03 NOTE — DISCHARGE INSTRUCTIONS
Return to the emergency department if you develop any new or worsening symptoms.   In the meantime please follow-up with your primary care doctor

## 2023-08-03 NOTE — ED PROVIDER NOTES
[facility] EMERGENCY DEPT  EMERGENCY DEPARTMENT ENCOUNTER      Pt Name: Valentina Wang  MRN: [id number]  [address] [facility] 1970  Date of evaluation: 8/3/2023  Provider: Robyn Hawk MD      HISTORY OF PRESENT ILLNESS      HPI        Nursing Notes were reviewed. REVIEW OF SYSTEMS         Review of Systems        PAST MEDICAL HISTORY     Past Medical History:   Diagnosis Date    Cancer [facility])     Chronic pain     Depression 2/10/2009    Fibrocystic breast 2/10/2009         SURGICAL HISTORY       Past Surgical History:   Procedure Laterality Date    BREAST BIOPSY Bilateral 20 + yrs ago    neg    CT BONE MARROW BIOPSY  2020    CT BONE MARROW BIOPSY 2020 [facility] RAD CT    HEENT      wisdom teeth extraction         CURRENT MEDICATIONS       Previous Medications    BUPROPION (WELLBUTRIN XL) 150 MG EXTENDED RELEASE TABLET    150 mg every morning. CLONAZEPAM (KLONOPIN) 0.5 MG TABLET    ceived the following from [facility]: Outside name: clonazePAM (KlonoPIN) 0.5 mg tablet    LISDEXAMFETAMINE DIMESYLATE (VYVANSE) 60 MG CAPS    ceived the following from [facility]: Outside name: Vyvanse 60 mg capsule       ALLERGIES     Patient has no known allergies. FAMILY HISTORY       Family History   Problem Relation Age of Onset    Cancer Mother         breast-63    Breast Cancer Mother 61    Stroke Father     Diabetes Neg Hx     Heart Disease Neg Hx     Hypertension Neg Hx           SOCIAL HISTORY       Social History     Socioeconomic History    Marital status:      Spouse name: None    Number of children: None    Years of education: None    Highest education level: None   Tobacco Use    Smoking status: Former     Types: Cigarettes     Quit date: 2000     Years since quittin.5    Smokeless tobacco: Never   Vaping Use    Vaping Use: Never used   Substance and Sexual Activity    Alcohol use: Not Currently     Alcohol/week: 4.2 standard drinks    Drug use:  No

## 2023-08-06 LAB
EKG ATRIAL RATE: 93 BPM
EKG DIAGNOSIS: NORMAL
EKG P AXIS: 63 DEGREES
EKG P-R INTERVAL: 142 MS
EKG Q-T INTERVAL: 374 MS
EKG QRS DURATION: 96 MS
EKG QTC CALCULATION (BAZETT): 465 MS
EKG R AXIS: 5 DEGREES
EKG T AXIS: 32 DEGREES
EKG VENTRICULAR RATE: 93 BPM

## 2023-08-29 ENCOUNTER — TELEPHONE (OUTPATIENT)
Age: 53
End: 2023-08-29

## 2023-08-29 NOTE — TELEPHONE ENCOUNTER
PT called in looking to see If the team could get her in touch with a patient advocate    CB# [phone]

## 2023-08-29 NOTE — TELEPHONE ENCOUNTER
08/29/23 10:55 AM Attempted to place return call to patient via contact number provided. No answer, left message requesting return call. Provided office phone number as well for call back. 3:04 PM Received return call from patient. Verified patient ID x 2. Patient states she had previously discussed a bill with patient advocate in office. Patient estimates that conversation took place shortly after she started treatment in 01/2022. Discussed that patient advocate is not located within office and billing office is separate from office as well. Reviewed chart in [address] within that timeframe but did not see any notation that staff discussed bill with patient. Provided patient with contact number for billing department to provide further guidance and better assist with patient's billing concerns. Encouraged patient to contact this office if any additional assistance needed. She voiced understanding.

## 2023-11-08 DIAGNOSIS — C82.13 FOLLICULAR LYMPHOMA GRADE II, INTRA-ABDOMINAL LYMPH NODES (HCC): ICD-10-CM

## 2023-11-08 LAB
ALBUMIN SERPL-MCNC: 4.4 G/DL (ref 3.5–5)
ALBUMIN/GLOB SERPL: 1.7 (ref 1.1–2.2)
ALP SERPL-CCNC: 114 U/L (ref 45–117)
ALT SERPL-CCNC: 22 U/L (ref 12–78)
ANION GAP SERPL CALC-SCNC: 6 MMOL/L (ref 5–15)
AST SERPL-CCNC: 12 U/L (ref 15–37)
BASOPHILS # BLD: 0.1 K/UL (ref 0–0.1)
BASOPHILS NFR BLD: 1 % (ref 0–1)
BILIRUB SERPL-MCNC: 0.5 MG/DL (ref 0.2–1)
BUN SERPL-MCNC: 15 MG/DL (ref 6–20)
BUN/CREAT SERPL: 16 (ref 12–20)
CALCIUM SERPL-MCNC: 9.9 MG/DL (ref 8.5–10.1)
CHLORIDE SERPL-SCNC: 105 MMOL/L (ref 97–108)
CO2 SERPL-SCNC: 29 MMOL/L (ref 21–32)
CREAT SERPL-MCNC: 0.91 MG/DL (ref 0.55–1.02)
DIFFERENTIAL METHOD BLD: NORMAL
EOSINOPHIL # BLD: 0.1 K/UL (ref 0–0.4)
EOSINOPHIL NFR BLD: 2 % (ref 0–7)
ERYTHROCYTE [DISTWIDTH] IN BLOOD BY AUTOMATED COUNT: 11.8 % (ref 11.5–14.5)
GLOBULIN SER CALC-MCNC: 2.6 G/DL (ref 2–4)
GLUCOSE SERPL-MCNC: 96 MG/DL (ref 65–100)
HCT VFR BLD AUTO: 41.4 % (ref 35–47)
HGB BLD-MCNC: 14 G/DL (ref 11.5–16)
IMM GRANULOCYTES # BLD AUTO: 0 K/UL (ref 0–0.04)
IMM GRANULOCYTES NFR BLD AUTO: 0 % (ref 0–0.5)
LDH SERPL L TO P-CCNC: 137 U/L (ref 81–246)
LYMPHOCYTES # BLD: 1.1 K/UL (ref 0.8–3.5)
LYMPHOCYTES NFR BLD: 23 % (ref 12–49)
MCH RBC QN AUTO: 32.6 PG (ref 26–34)
MCHC RBC AUTO-ENTMCNC: 33.8 G/DL (ref 30–36.5)
MCV RBC AUTO: 96.5 FL (ref 80–99)
MONOCYTES # BLD: 0.4 K/UL (ref 0–1)
MONOCYTES NFR BLD: 10 % (ref 5–13)
NEUTS SEG # BLD: 2.9 K/UL (ref 1.8–8)
NEUTS SEG NFR BLD: 64 % (ref 32–75)
NRBC # BLD: 0 K/UL (ref 0–0.01)
NRBC BLD-RTO: 0 PER 100 WBC
PLATELET # BLD AUTO: 290 K/UL (ref 150–400)
PMV BLD AUTO: 9.3 FL (ref 8.9–12.9)
POTASSIUM SERPL-SCNC: 4.1 MMOL/L (ref 3.5–5.1)
PROT SERPL-MCNC: 7 G/DL (ref 6.4–8.2)
RBC # BLD AUTO: 4.29 M/UL (ref 3.8–5.2)
SODIUM SERPL-SCNC: 140 MMOL/L (ref 136–145)
WBC # BLD AUTO: 4.6 K/UL (ref 3.6–11)

## 2023-11-09 ENCOUNTER — OFFICE VISIT (OUTPATIENT)
Age: 53
End: 2023-11-09
Payer: COMMERCIAL

## 2023-11-09 VITALS
SYSTOLIC BLOOD PRESSURE: 105 MMHG | DIASTOLIC BLOOD PRESSURE: 70 MMHG | TEMPERATURE: 98.3 F | WEIGHT: 145.8 LBS | OXYGEN SATURATION: 100 % | HEIGHT: 67 IN | HEART RATE: 85 BPM | BODY MASS INDEX: 22.88 KG/M2 | RESPIRATION RATE: 18 BRPM

## 2023-11-09 DIAGNOSIS — M81.0 OSTEOPOROSIS WITHOUT CURRENT PATHOLOGICAL FRACTURE, UNSPECIFIED OSTEOPOROSIS TYPE: ICD-10-CM

## 2023-11-09 DIAGNOSIS — R53.83 OTHER FATIGUE: ICD-10-CM

## 2023-11-09 DIAGNOSIS — K59.00 CONSTIPATION, UNSPECIFIED CONSTIPATION TYPE: ICD-10-CM

## 2023-11-09 DIAGNOSIS — Z71.85 VACCINE COUNSELING: ICD-10-CM

## 2023-11-09 DIAGNOSIS — C82.13 FOLLICULAR LYMPHOMA GRADE II, INTRA-ABDOMINAL LYMPH NODES (HCC): Primary | ICD-10-CM

## 2023-11-09 DIAGNOSIS — R59.0 ENLARGED LYMPH NODE IN NECK: ICD-10-CM

## 2023-11-09 PROCEDURE — 99214 OFFICE O/P EST MOD 30 MIN: CPT | Performed by: INTERNAL MEDICINE

## 2023-11-09 ASSESSMENT — PATIENT HEALTH QUESTIONNAIRE - PHQ9
1. LITTLE INTEREST OR PLEASURE IN DOING THINGS: 0
SUM OF ALL RESPONSES TO PHQ QUESTIONS 1-9: 0
2. FEELING DOWN, DEPRESSED OR HOPELESS: 0
SUM OF ALL RESPONSES TO PHQ9 QUESTIONS 1 & 2: 0

## 2023-11-09 NOTE — PROGRESS NOTES
[id number] [facility] Oncology at [address] [facility]  [phone]    Hematology / Oncology Established Visit    Reason for Visit:   Robi Vasquez is a 47 y.o. female who is seen for follow up of lymphoma. PCP is Dr. Doris Arnold. Hematology Oncology Treatment History:     Diagnosis: Follicular lymphoma    Stage: III; FLIPI2 low risk    Pathology:   -1/20/20 Right inguinal LN excision: Follicular lymphoma, grade 1-2, follicular pattern, YR49 positive. Comment:   The LN specimen demonstrates increased follicles, lacking normal germinal centers. IHC markers are performed with good controls. Tumor cells are positive for CD20 and BCL2. CD3 marks background T cells. Cyclin D1 is negative in the lymphoid population. CD21 highlights intact dendritic meshworks. Flow cytometry shows positive expression of CD10 and kappa LC restriction. Ki-67 is pending. Focally there are a few follicles with greater than fifteen centroblasts per HPF; however the majority of follicles have less than 15 centroblasts/HPF, consistent with grade 1-2. Diffuse areas are not seen.    -2/5/20 Bone marrow biopsy: Normocellular marrow with multilineage hematopoiesis and <1% involvement by a CD10 positive B-cell lymphoproliferative disorder. Negative for immunophenotypic or morphologic evidence of dysplasia no increase in blasts. Comment   Flow cytometric analysis reveals a small population of CD10 positive clonal B cells compatible with involvement by the patient's recently diagnosed follicular lymphoma. FISH studies are pending and will be reported. Prior Treatment: Bendamustine-Rituximab x 6 cycles, started 1/2022-5/31/22    Current Treatment: Surveillance    History of Present Illness:   oRbi Vasquez is a 47 y.o. female who comes in for evaluation of Follicular lymphoma. At presentation, pt reported vague symptoms for several years: h/o pruritus which started 6 yrs prior and finally subsided with Zyrtec.  She saw

## 2023-11-14 ENCOUNTER — HOSPITAL ENCOUNTER (EMERGENCY)
Facility: HOSPITAL | Age: 53
Discharge: HOME | End: 2023-11-14
Attending: EMERGENCY MEDICINE
Payer: COMMERCIAL

## 2023-11-14 ENCOUNTER — APPOINTMENT (OUTPATIENT)
Facility: HOSPITAL | Age: 53
End: 2023-11-14
Payer: COMMERCIAL

## 2023-11-14 VITALS
TEMPERATURE: 98.6 F | OXYGEN SATURATION: 98 % | SYSTOLIC BLOOD PRESSURE: 146 MMHG | RESPIRATION RATE: 16 BRPM | DIASTOLIC BLOOD PRESSURE: 81 MMHG | HEART RATE: 84 BPM

## 2023-11-14 DIAGNOSIS — M54.2 NECK PAIN: Primary | ICD-10-CM

## 2023-11-14 LAB
ALBUMIN SERPL-MCNC: 4.2 G/DL (ref 3.5–5)
ALBUMIN/GLOB SERPL: 1.4 (ref 1.1–2.2)
ALP SERPL-CCNC: 106 U/L (ref 45–117)
ALT SERPL-CCNC: 26 U/L (ref 12–78)
ANION GAP SERPL CALC-SCNC: 7 MMOL/L (ref 5–15)
AST SERPL-CCNC: 20 U/L (ref 15–37)
BASOPHILS # BLD: 0.1 K/UL (ref 0–0.1)
BASOPHILS NFR BLD: 1 % (ref 0–1)
BILIRUB SERPL-MCNC: 0.5 MG/DL (ref 0.2–1)
BUN SERPL-MCNC: 13 MG/DL (ref 6–20)
BUN/CREAT SERPL: 14 (ref 12–20)
CALCIUM SERPL-MCNC: 9.8 MG/DL (ref 8.5–10.1)
CHLORIDE SERPL-SCNC: 102 MMOL/L (ref 97–108)
CO2 SERPL-SCNC: 27 MMOL/L (ref 21–32)
CREAT SERPL-MCNC: 0.9 MG/DL (ref 0.55–1.02)
DIFFERENTIAL METHOD BLD: ABNORMAL
EOSINOPHIL # BLD: 0.1 K/UL (ref 0–0.4)
EOSINOPHIL NFR BLD: 1 % (ref 0–7)
ERYTHROCYTE [DISTWIDTH] IN BLOOD BY AUTOMATED COUNT: 11.4 % (ref 11.5–14.5)
GLOBULIN SER CALC-MCNC: 3.1 G/DL (ref 2–4)
GLUCOSE SERPL-MCNC: 109 MG/DL (ref 65–100)
HCT VFR BLD AUTO: 37.8 % (ref 35–47)
HGB BLD-MCNC: 13.4 G/DL (ref 11.5–16)
IMM GRANULOCYTES # BLD AUTO: 0 K/UL (ref 0–0.04)
IMM GRANULOCYTES NFR BLD AUTO: 0 % (ref 0–0.5)
LYMPHOCYTES # BLD: 1 K/UL (ref 0.8–3.5)
LYMPHOCYTES NFR BLD: 17 % (ref 12–49)
MAGNESIUM SERPL-MCNC: 2 MG/DL (ref 1.6–2.4)
MCH RBC QN AUTO: 34.1 PG (ref 26–34)
MCHC RBC AUTO-ENTMCNC: 35.4 G/DL (ref 30–36.5)
MCV RBC AUTO: 96.2 FL (ref 80–99)
MONOCYTES # BLD: 0.5 K/UL (ref 0–1)
MONOCYTES NFR BLD: 9 % (ref 5–13)
NEUTS SEG # BLD: 4.1 K/UL (ref 1.8–8)
NEUTS SEG NFR BLD: 72 % (ref 32–75)
NRBC # BLD: 0 K/UL (ref 0–0.01)
NRBC BLD-RTO: 0 PER 100 WBC
PLATELET # BLD AUTO: 268 K/UL (ref 150–400)
PMV BLD AUTO: 8.9 FL (ref 8.9–12.9)
POTASSIUM SERPL-SCNC: 3.9 MMOL/L (ref 3.5–5.1)
PROT SERPL-MCNC: 7.3 G/DL (ref 6.4–8.2)
RBC # BLD AUTO: 3.93 M/UL (ref 3.8–5.2)
SODIUM SERPL-SCNC: 136 MMOL/L (ref 136–145)
WBC # BLD AUTO: 5.7 K/UL (ref 3.6–11)

## 2023-11-14 PROCEDURE — 70450 CT HEAD/BRAIN W/O DYE: CPT

## 2023-11-14 PROCEDURE — 70498 CT ANGIOGRAPHY NECK: CPT

## 2023-11-14 PROCEDURE — 99285 EMERGENCY DEPT VISIT HI MDM: CPT

## 2023-11-14 PROCEDURE — 80053 COMPREHEN METABOLIC PANEL: CPT

## 2023-11-14 PROCEDURE — 93005 ELECTROCARDIOGRAM TRACING: CPT | Performed by: EMERGENCY MEDICINE

## 2023-11-14 PROCEDURE — 83735 ASSAY OF MAGNESIUM: CPT

## 2023-11-14 PROCEDURE — 6360000004 HC RX CONTRAST MEDICATION: Performed by: EMERGENCY MEDICINE

## 2023-11-14 PROCEDURE — 85025 COMPLETE CBC W/AUTO DIFF WBC: CPT

## 2023-11-14 PROCEDURE — 36415 COLL VENOUS BLD VENIPUNCTURE: CPT

## 2023-11-14 RX ADMIN — IOPAMIDOL 100 ML: 755 INJECTION, SOLUTION INTRAVENOUS at 22:13

## 2023-11-14 ASSESSMENT — PAIN DESCRIPTION - PAIN TYPE: TYPE: ACUTE PAIN

## 2023-11-14 ASSESSMENT — PAIN DESCRIPTION - ORIENTATION: ORIENTATION: RIGHT

## 2023-11-14 ASSESSMENT — PAIN DESCRIPTION - DESCRIPTORS: DESCRIPTORS: PRESSURE

## 2023-11-14 ASSESSMENT — PAIN SCALES - GENERAL: PAINLEVEL_OUTOF10: 4

## 2023-11-14 ASSESSMENT — PAIN - FUNCTIONAL ASSESSMENT
PAIN_FUNCTIONAL_ASSESSMENT: ACTIVITIES ARE NOT PREVENTED
PAIN_FUNCTIONAL_ASSESSMENT: 0-10

## 2023-11-14 ASSESSMENT — PAIN DESCRIPTION - LOCATION: LOCATION: THROAT;NECK

## 2023-11-15 LAB
EKG ATRIAL RATE: 83 BPM
EKG ATRIAL RATE: 90 BPM
EKG DIAGNOSIS: NORMAL
EKG DIAGNOSIS: NORMAL
EKG P AXIS: 63 DEGREES
EKG P AXIS: 69 DEGREES
EKG P-R INTERVAL: 134 MS
EKG P-R INTERVAL: 142 MS
EKG Q-T INTERVAL: 378 MS
EKG Q-T INTERVAL: 392 MS
EKG QRS DURATION: 100 MS
EKG QRS DURATION: 106 MS
EKG QTC CALCULATION (BAZETT): 460 MS
EKG QTC CALCULATION (BAZETT): 462 MS
EKG R AXIS: 3 DEGREES
EKG R AXIS: 7 DEGREES
EKG T AXIS: 19 DEGREES
EKG T AXIS: 26 DEGREES
EKG VENTRICULAR RATE: 83 BPM
EKG VENTRICULAR RATE: 90 BPM

## 2023-11-15 PROCEDURE — 93010 ELECTROCARDIOGRAM REPORT: CPT | Performed by: SPECIALIST

## 2023-11-15 NOTE — ED NOTES
ED SIGN OUT NOTE  Care assumed at [facility] 11:06 PM EST    Patient was signed out to me by Dr. Anand Maldonado. Patient is awaiting CT reads. BP (!) 146/81   Resp 16   SpO2 98%     Labs Reviewed   COMPREHENSIVE METABOLIC PANEL - Abnormal; Notable for the following components:       Result Value    Glucose 109 (*)     All other components within normal limits   CBC WITH AUTO DIFFERENTIAL - Abnormal; Notable for the following components:    MCH 34.1 (*)     RDW 11.4 (*)     All other components within normal limits   MAGNESIUM     CT Head W/O Contrast   Final Result   No acute findings. [address]             ED Course as of 11/14/23 2332   Tue Nov 14, 2023 2150 ED EKG interpretation:  Rhythm: normal sinus rhythm. Rate (approx.):  90. Axis: normal.  ST segment:  No concerning ST elevations or depressions. This EKG was interpreted by Caleb Corado MD,ED Physician. [JM]   2032 I have independently viewed the obtained radiographic images and note CT head without acute process. Will await radiology read. [JM]   5769 Signout  53f no sig pmh here for neck pain, had episode of visual symptom today. Labs reassuring, no neuro deficits. Pending CT's > dispo. Can be discharged if CT's negative.  [AS]   7698 CT Head W/O Contrast  IMPRESSION:  No acute findings. [AS]   6844 CTA HEAD NECK W CONTRAST  No large vessel occlusion. [AS]   0898 CT's reassuring. Patient without significant symptoms on reassessment. To be discharged with neurology follow up. [AS]      ED Course User Index  [AS] Joan Pearce MD  [JM] Cari Rendon MD       Diagnosis:   1.  Neck pain        Disposition:   Decision To Discharge 11/14/2023 11:30:34 PM    Plan:   Discharge    MD Joan Malloy MD  11/14/23 4062

## 2023-11-15 NOTE — DISCHARGE INSTRUCTIONS
Take Tylenol and/or Motrin as needed for pain. Return to the emergency department if you have any additional vision changes, numbness, tingling or any other changing or worsening symptoms. Otherwise follow-up with a neurologist soon as possible within 3 days. Use the number provided to schedule appointment.

## 2023-11-15 NOTE — ED TRIAGE NOTES
Pt reports sore throat and tightness in her neck that started last Friday. Pt reports some vision changes while sitting the airport as she was travelling at the time.

## 2023-11-15 NOTE — ED PROVIDER NOTES
[facility] EMERGENCY DEPT  EMERGENCY DEPARTMENT ENCOUNTER      Pt Name: Tay Tripathi  MRN: [id number]  [address] [facility] 1970  Date of evaluation: 11/14/2023  Provider: Lelo Garcia MD      HISTORY OF PRESENT ILLNESS      32-year-old female history of chronic pain, depression presents to the emergency department her  with concern for feeling \"off. \"  Symptoms began today while on a layover from a flight in [place]. Has also been having some abnormal neck fullness sensation for the last at least several days. No fevers, no injury. She describes a feeling during her layover that her vision was off. No blurry vision but felt that her depth perception was somehow affected. She has a slight headache. No chest pain. The history is provided by the patient and medical records. Nursing Notes were reviewed. REVIEW OF SYSTEMS         Review of Systems        PAST MEDICAL HISTORY     Past Medical History:   Diagnosis Date    Cancer [facility])     Chronic pain     Depression 2/10/2009    Fibrocystic breast 2/10/2009         SURGICAL HISTORY       Past Surgical History:   Procedure Laterality Date    BREAST BIOPSY Bilateral 20 + yrs ago    neg    CT BONE MARROW BIOPSY  2/5/2020    CT BONE MARROW BIOPSY 2/5/2020 [facility] RAD CT    HEENT      wisdom teeth extraction         CURRENT MEDICATIONS       Previous Medications    BUPROPION (WELLBUTRIN XL) 150 MG EXTENDED RELEASE TABLET    150 mg every morning. CLONAZEPAM (KLONOPIN) 0.5 MG TABLET    ceived the following from [facility]: Outside name: clonazePAM (KlonoPIN) 0.5 mg tablet    LISDEXAMFETAMINE DIMESYLATE (VYVANSE) 60 MG CAPS    50 mg. ceived the following from [facility]: Outside name: Vyvanse 60 mg capsule       ALLERGIES     Patient has no known allergies.     FAMILY HISTORY       Family History   Problem Relation Age of Onset    Cancer Mother         breast-63    Breast Cancer Mother 61    Stroke Father

## 2023-11-17 ENCOUNTER — HOSPITAL ENCOUNTER (OUTPATIENT)
Facility: HOSPITAL | Age: 53
Discharge: HOME | End: 2023-11-17
Attending: INTERNAL MEDICINE
Payer: COMMERCIAL

## 2023-11-17 ENCOUNTER — OFFICE VISIT (OUTPATIENT)
Age: 53
End: 2023-11-17
Payer: COMMERCIAL

## 2023-11-17 VITALS
SYSTOLIC BLOOD PRESSURE: 126 MMHG | WEIGHT: 146 LBS | HEIGHT: 67 IN | RESPIRATION RATE: 16 BRPM | BODY MASS INDEX: 22.91 KG/M2 | DIASTOLIC BLOOD PRESSURE: 84 MMHG | OXYGEN SATURATION: 98 % | HEART RATE: 91 BPM | TEMPERATURE: 97.9 F

## 2023-11-17 DIAGNOSIS — C82.90 FOLLICULAR LYMPHOMA, UNSPECIFIED FOLLICULAR LYMPHOMA TYPE, UNSPECIFIED BODY REGION (HCC): Primary | ICD-10-CM

## 2023-11-17 DIAGNOSIS — C82.90 FOLLICULAR LYMPHOMA, UNSPECIFIED FOLLICULAR LYMPHOMA TYPE, UNSPECIFIED BODY REGION (HCC): ICD-10-CM

## 2023-11-17 PROCEDURE — 99214 OFFICE O/P EST MOD 30 MIN: CPT | Performed by: INTERNAL MEDICINE

## 2023-11-17 PROCEDURE — 74177 CT ABD & PELVIS W/CONTRAST: CPT

## 2023-11-17 PROCEDURE — 6360000004 HC RX CONTRAST MEDICATION: Performed by: INTERNAL MEDICINE

## 2023-11-17 RX ADMIN — IOPAMIDOL 100 ML: 755 INJECTION, SOLUTION INTRAVENOUS at 16:58

## 2023-11-17 SDOH — ECONOMIC STABILITY: FOOD INSECURITY: WITHIN THE PAST 12 MONTHS, YOU WORRIED THAT YOUR FOOD WOULD RUN OUT BEFORE YOU GOT MONEY TO BUY MORE.: NEVER TRUE

## 2023-11-17 SDOH — ECONOMIC STABILITY: INCOME INSECURITY: HOW HARD IS IT FOR YOU TO PAY FOR THE VERY BASICS LIKE FOOD, HOUSING, MEDICAL CARE, AND HEATING?: NOT HARD AT ALL

## 2023-11-17 SDOH — ECONOMIC STABILITY: HOUSING INSECURITY
IN THE LAST 12 MONTHS, WAS THERE A TIME WHEN YOU DID NOT HAVE A STEADY PLACE TO SLEEP OR SLEPT IN A SHELTER (INCLUDING NOW)?: NO

## 2023-11-17 SDOH — ECONOMIC STABILITY: FOOD INSECURITY: WITHIN THE PAST 12 MONTHS, THE FOOD YOU BOUGHT JUST DIDN'T LAST AND YOU DIDN'T HAVE MONEY TO GET MORE.: NEVER TRUE

## 2023-11-17 ASSESSMENT — PATIENT HEALTH QUESTIONNAIRE - PHQ9
SUM OF ALL RESPONSES TO PHQ QUESTIONS 1-9: 0
SUM OF ALL RESPONSES TO PHQ QUESTIONS 1-9: 0
2. FEELING DOWN, DEPRESSED OR HOPELESS: 0
SUM OF ALL RESPONSES TO PHQ QUESTIONS 1-9: 0
1. LITTLE INTEREST OR PLEASURE IN DOING THINGS: 0
SUM OF ALL RESPONSES TO PHQ QUESTIONS 1-9: 0
SUM OF ALL RESPONSES TO PHQ9 QUESTIONS 1 & 2: 0

## 2023-11-17 NOTE — PROGRESS NOTES
Regino Malik (:  1970) is a 47 y.o. female,Established patient, here for evaluation of the following chief complaint(s):  Dizziness (Lightheaded; feels like shes going to pass out; tingliness)         ASSESSMENT/PLAN:  1. Follicular lymphoma, unspecified follicular lymphoma type, unspecified body region [facility])  -     CT CHEST ABDOMEN PELVIS W CONTRAST Additional Contrast? Radiologist Recommendation; Future  She is having very obscure symptoms but all seem to be related to epigastric symptoms with diarrhea cramping and when I press on that region she feels the reproduction of of nausea lightheadedness and dizziness. I am wondering if there is some return of some of the retroperitoneal lymph nodes and's causing some compression and thus the symptoms that she is feeling over the last week. I reassured her that her CTA and CT of the head were normal.  Her blood pressure and pulse ox are okay but there may be some compression from possible lymph nodes that is creating this. If her CT is normal then we will have to come up with a different etiology. I reviewed her labs from the ER that were normal certainly does not seem consistent with POTS as she is not having any tachycardia or orthostatic hypotension    No follow-ups on file. Subjective   SUBJECTIVE/OBJECTIVE:  HPI  Had a wave of radiating pain in abdomen- diarrhea, constipation and cramping has been for last 6 weeks ; other symptoms are burning in lymph nodes   Sitting plane, sitting in chair - gets episodes where feels pressure in neck and tingling in arms and lightheaded and going to pass out  Wave of coolness and tingling     She does have a history follicular lymphoma  CT Result (most recent):  CTA HEAD NECK W CONTRAST 2023    Narrative      No large vessel occlusion. Preliminary report was provided by Dr. Ana Caballero, the on-call radiologist, at  23:24    Final report to follow.         CLINICAL HISTORY: dizzy  INDICATION:

## 2024-04-18 ENCOUNTER — HOSPITAL ENCOUNTER (INPATIENT)
Facility: HOSPITAL | Age: 54
LOS: 1 days | Discharge: HOME | End: 2024-04-19
Attending: EMERGENCY MEDICINE | Admitting: HOSPITALIST
Payer: COMMERCIAL

## 2024-04-18 ENCOUNTER — APPOINTMENT (OUTPATIENT)
Facility: HOSPITAL | Age: 54
End: 2024-04-18
Payer: COMMERCIAL

## 2024-04-18 DIAGNOSIS — R07.9 CHEST PAIN, UNSPECIFIED TYPE: ICD-10-CM

## 2024-04-18 DIAGNOSIS — K12.2 UVULITIS: ICD-10-CM

## 2024-04-18 DIAGNOSIS — T78.3XXA ANGIOEDEMA, INITIAL ENCOUNTER: Primary | ICD-10-CM

## 2024-04-18 LAB
ALBUMIN SERPL-MCNC: 4.2 G/DL (ref 3.5–5)
ALBUMIN/GLOB SERPL: 1.3 (ref 1.1–2.2)
ALP SERPL-CCNC: 123 U/L (ref 45–117)
ALT SERPL-CCNC: 26 U/L (ref 12–78)
ANION GAP SERPL CALC-SCNC: 9 MMOL/L (ref 5–15)
AST SERPL-CCNC: 26 U/L (ref 15–37)
BASOPHILS # BLD: 0 K/UL (ref 0–0.1)
BASOPHILS NFR BLD: 1 % (ref 0–1)
BILIRUB SERPL-MCNC: 0.9 MG/DL (ref 0.2–1)
BUN SERPL-MCNC: 16 MG/DL (ref 6–20)
BUN/CREAT SERPL: 17 (ref 12–20)
CALCIUM SERPL-MCNC: 9.3 MG/DL (ref 8.5–10.1)
CHLORIDE SERPL-SCNC: 101 MMOL/L (ref 97–108)
CO2 SERPL-SCNC: 27 MMOL/L (ref 21–32)
CREAT SERPL-MCNC: 0.96 MG/DL (ref 0.55–1.02)
DIFFERENTIAL METHOD BLD: NORMAL
EOSINOPHIL # BLD: 0.1 K/UL (ref 0–0.4)
EOSINOPHIL NFR BLD: 1 % (ref 0–7)
ERYTHROCYTE [DISTWIDTH] IN BLOOD BY AUTOMATED COUNT: 11.8 % (ref 11.5–14.5)
GLOBULIN SER CALC-MCNC: 3.3 G/DL (ref 2–4)
GLUCOSE SERPL-MCNC: 89 MG/DL (ref 65–100)
HCT VFR BLD AUTO: 41.9 % (ref 35–47)
HGB BLD-MCNC: 14.4 G/DL (ref 11.5–16)
IMM GRANULOCYTES # BLD AUTO: 0 K/UL (ref 0–0.04)
IMM GRANULOCYTES NFR BLD AUTO: 0 % (ref 0–0.5)
LYMPHOCYTES # BLD: 1.2 K/UL (ref 0.8–3.5)
LYMPHOCYTES NFR BLD: 20 % (ref 12–49)
MCH RBC QN AUTO: 34 PG (ref 26–34)
MCHC RBC AUTO-ENTMCNC: 34.4 G/DL (ref 30–36.5)
MCV RBC AUTO: 98.8 FL (ref 80–99)
MONOCYTES # BLD: 0.7 K/UL (ref 0–1)
MONOCYTES NFR BLD: 12 % (ref 5–13)
NEUTS SEG # BLD: 4.2 K/UL (ref 1.8–8)
NEUTS SEG NFR BLD: 66 % (ref 32–75)
NRBC # BLD: 0 K/UL (ref 0–0.01)
NRBC BLD-RTO: 0 PER 100 WBC
PLATELET # BLD AUTO: 253 K/UL (ref 150–400)
PMV BLD AUTO: 10.2 FL (ref 8.9–12.9)
POTASSIUM SERPL-SCNC: 5.1 MMOL/L (ref 3.5–5.1)
PROT SERPL-MCNC: 7.5 G/DL (ref 6.4–8.2)
RBC # BLD AUTO: 4.24 M/UL (ref 3.8–5.2)
SODIUM SERPL-SCNC: 137 MMOL/L (ref 136–145)
TROPONIN I SERPL HS-MCNC: 21 NG/L (ref 0–51)
WBC # BLD AUTO: 6.3 K/UL (ref 3.6–11)

## 2024-04-18 PROCEDURE — 84484 ASSAY OF TROPONIN QUANT: CPT

## 2024-04-18 PROCEDURE — 6360000002 HC RX W HCPCS: Performed by: EMERGENCY MEDICINE

## 2024-04-18 PROCEDURE — 2580000003 HC RX 258: Performed by: EMERGENCY MEDICINE

## 2024-04-18 PROCEDURE — 85025 COMPLETE CBC W/AUTO DIFF WBC: CPT

## 2024-04-18 PROCEDURE — 2580000003 HC RX 258: Performed by: HOSPITALIST

## 2024-04-18 PROCEDURE — 93005 ELECTROCARDIOGRAM TRACING: CPT | Performed by: EMERGENCY MEDICINE

## 2024-04-18 PROCEDURE — A4216 STERILE WATER/SALINE, 10 ML: HCPCS | Performed by: EMERGENCY MEDICINE

## 2024-04-18 PROCEDURE — 6360000002 HC RX W HCPCS: Performed by: HOSPITALIST

## 2024-04-18 PROCEDURE — 96375 TX/PRO/DX INJ NEW DRUG ADDON: CPT

## 2024-04-18 PROCEDURE — 2060000000 HC ICU INTERMEDIATE R&B

## 2024-04-18 PROCEDURE — 96374 THER/PROPH/DIAG INJ IV PUSH: CPT

## 2024-04-18 PROCEDURE — 70491 CT SOFT TISSUE NECK W/DYE: CPT

## 2024-04-18 PROCEDURE — 2500000003 HC RX 250 WO HCPCS: Performed by: EMERGENCY MEDICINE

## 2024-04-18 PROCEDURE — 99285 EMERGENCY DEPT VISIT HI MDM: CPT

## 2024-04-18 PROCEDURE — 6370000000 HC RX 637 (ALT 250 FOR IP): Performed by: EMERGENCY MEDICINE

## 2024-04-18 PROCEDURE — 94640 AIRWAY INHALATION TREATMENT: CPT

## 2024-04-18 PROCEDURE — 36415 COLL VENOUS BLD VENIPUNCTURE: CPT

## 2024-04-18 PROCEDURE — 80053 COMPREHEN METABOLIC PANEL: CPT

## 2024-04-18 PROCEDURE — 96372 THER/PROPH/DIAG INJ SC/IM: CPT

## 2024-04-18 PROCEDURE — 71275 CT ANGIOGRAPHY CHEST: CPT

## 2024-04-18 PROCEDURE — 6360000004 HC RX CONTRAST MEDICATION: Performed by: EMERGENCY MEDICINE

## 2024-04-18 RX ORDER — LISDEXAMFETAMINE DIMESYLATE CAPSULES 60 MG/1
50 CAPSULE ORAL DAILY
Status: DISCONTINUED | OUTPATIENT
Start: 2024-04-19 | End: 2024-04-18

## 2024-04-18 RX ORDER — BUPROPION HYDROCHLORIDE 150 MG/1
150 TABLET ORAL DAILY
Status: DISCONTINUED | OUTPATIENT
Start: 2024-04-19 | End: 2024-04-19 | Stop reason: HOSPADM

## 2024-04-18 RX ORDER — ACETAMINOPHEN 650 MG/1
650 SUPPOSITORY RECTAL EVERY 6 HOURS PRN
Status: DISCONTINUED | OUTPATIENT
Start: 2024-04-18 | End: 2024-04-19 | Stop reason: HOSPADM

## 2024-04-18 RX ORDER — POTASSIUM CHLORIDE 7.45 MG/ML
10 INJECTION INTRAVENOUS PRN
Status: DISCONTINUED | OUTPATIENT
Start: 2024-04-18 | End: 2024-04-19 | Stop reason: HOSPADM

## 2024-04-18 RX ORDER — SODIUM CHLORIDE FOR INHALATION 0.9 %
3 VIAL, NEBULIZER (ML) INHALATION EVERY 4 HOURS PRN
Status: DISCONTINUED | OUTPATIENT
Start: 2024-04-18 | End: 2024-04-18

## 2024-04-18 RX ORDER — ONDANSETRON 2 MG/ML
4 INJECTION INTRAMUSCULAR; INTRAVENOUS EVERY 6 HOURS PRN
Status: DISCONTINUED | OUTPATIENT
Start: 2024-04-18 | End: 2024-04-19 | Stop reason: HOSPADM

## 2024-04-18 RX ORDER — 0.9 % SODIUM CHLORIDE 0.9 %
1000 INTRAVENOUS SOLUTION INTRAVENOUS ONCE
Status: COMPLETED | OUTPATIENT
Start: 2024-04-18 | End: 2024-04-18

## 2024-04-18 RX ORDER — SODIUM CHLORIDE 0.9 % (FLUSH) 0.9 %
5-40 SYRINGE (ML) INJECTION EVERY 12 HOURS SCHEDULED
Status: DISCONTINUED | OUTPATIENT
Start: 2024-04-18 | End: 2024-04-19 | Stop reason: HOSPADM

## 2024-04-18 RX ORDER — POTASSIUM CHLORIDE 750 MG/1
40 TABLET, FILM COATED, EXTENDED RELEASE ORAL PRN
Status: DISCONTINUED | OUTPATIENT
Start: 2024-04-18 | End: 2024-04-19 | Stop reason: HOSPADM

## 2024-04-18 RX ORDER — ACETAMINOPHEN 325 MG/1
650 TABLET ORAL EVERY 6 HOURS PRN
Status: DISCONTINUED | OUTPATIENT
Start: 2024-04-18 | End: 2024-04-19 | Stop reason: HOSPADM

## 2024-04-18 RX ORDER — EPINEPHRINE 1 MG/ML
0.3 INJECTION, SOLUTION, CONCENTRATE INTRAVENOUS ONCE
Status: COMPLETED | OUTPATIENT
Start: 2024-04-18 | End: 2024-04-18

## 2024-04-18 RX ORDER — ENOXAPARIN SODIUM 100 MG/ML
40 INJECTION SUBCUTANEOUS DAILY
Status: DISCONTINUED | OUTPATIENT
Start: 2024-04-18 | End: 2024-04-19 | Stop reason: HOSPADM

## 2024-04-18 RX ORDER — DEXAMETHASONE SODIUM PHOSPHATE 10 MG/ML
10 INJECTION, SOLUTION INTRAMUSCULAR; INTRAVENOUS ONCE
Status: COMPLETED | OUTPATIENT
Start: 2024-04-18 | End: 2024-04-18

## 2024-04-18 RX ORDER — SODIUM CHLORIDE 0.9 % (FLUSH) 0.9 %
5-40 SYRINGE (ML) INJECTION PRN
Status: DISCONTINUED | OUTPATIENT
Start: 2024-04-18 | End: 2024-04-19 | Stop reason: HOSPADM

## 2024-04-18 RX ORDER — ONDANSETRON 4 MG/1
4 TABLET, ORALLY DISINTEGRATING ORAL EVERY 8 HOURS PRN
Status: DISCONTINUED | OUTPATIENT
Start: 2024-04-18 | End: 2024-04-19 | Stop reason: HOSPADM

## 2024-04-18 RX ORDER — MAGNESIUM SULFATE IN WATER 40 MG/ML
2000 INJECTION, SOLUTION INTRAVENOUS PRN
Status: DISCONTINUED | OUTPATIENT
Start: 2024-04-18 | End: 2024-04-19 | Stop reason: HOSPADM

## 2024-04-18 RX ORDER — SODIUM CHLORIDE 9 MG/ML
INJECTION, SOLUTION INTRAVENOUS PRN
Status: DISCONTINUED | OUTPATIENT
Start: 2024-04-18 | End: 2024-04-19 | Stop reason: HOSPADM

## 2024-04-18 RX ORDER — POLYETHYLENE GLYCOL 3350 17 G/17G
17 POWDER, FOR SOLUTION ORAL DAILY PRN
Status: DISCONTINUED | OUTPATIENT
Start: 2024-04-18 | End: 2024-04-19 | Stop reason: HOSPADM

## 2024-04-18 RX ADMIN — SODIUM CHLORIDE, PRESERVATIVE FREE 10 ML: 5 INJECTION INTRAVENOUS at 20:06

## 2024-04-18 RX ADMIN — RACEPINEPHRINE HYDROCHLORIDE 0.5 ML: 11.25 SOLUTION RESPIRATORY (INHALATION) at 10:38

## 2024-04-18 RX ADMIN — SODIUM CHLORIDE 1000 ML: 9 INJECTION, SOLUTION INTRAVENOUS at 10:38

## 2024-04-18 RX ADMIN — FAMOTIDINE 20 MG: 10 INJECTION, SOLUTION INTRAVENOUS at 10:38

## 2024-04-18 RX ADMIN — SODIUM CHLORIDE 3000 MG: 900 INJECTION INTRAVENOUS at 13:21

## 2024-04-18 RX ADMIN — ENOXAPARIN SODIUM 40 MG: 100 INJECTION SUBCUTANEOUS at 20:06

## 2024-04-18 RX ADMIN — IOPAMIDOL 100 ML: 755 INJECTION, SOLUTION INTRAVENOUS at 11:54

## 2024-04-18 RX ADMIN — DEXAMETHASONE SODIUM PHOSPHATE 10 MG: 10 INJECTION, SOLUTION INTRAMUSCULAR; INTRAVENOUS at 10:35

## 2024-04-18 RX ADMIN — EPINEPHRINE 0.3 MG: 1 INJECTION, SOLUTION, CONCENTRATE INTRAVENOUS at 10:30

## 2024-04-18 ASSESSMENT — ENCOUNTER SYMPTOMS
SORE THROAT: 1
DIARRHEA: 0
COUGH: 0
NAUSEA: 0
VOMITING: 0
TROUBLE SWALLOWING: 1
BLOOD IN STOOL: 0
SHORTNESS OF BREATH: 0
ABDOMINAL PAIN: 0
ANAL BLEEDING: 0
VOICE CHANGE: 1
ABDOMINAL DISTENTION: 0

## 2024-04-18 ASSESSMENT — PAIN - FUNCTIONAL ASSESSMENT: PAIN_FUNCTIONAL_ASSESSMENT: NONE - DENIES PAIN

## 2024-04-18 NOTE — ED NOTES
Patient resting comfortably in no distress reports improvement in s/sx, tongue not as swollen significant other at bedside

## 2024-04-18 NOTE — H&P
Hospitalist Admission Note      NAME:  Briseyda Munoz   :  1970   MRN:  [id number]     Date/Time:  2024 3:38 PM    Patient PCP: Grace Green MD    ________________________________________________________________________    Given the patient's current clinical presentation, I have a high level of concern for decompensation if discharged from the emergency department.  Complex decision making was performed, which includes reviewing the patient's available past medical records, laboratory results, and x-ray films.       My assessment of this patient's clinical condition and my plan of care is as follows.    Assessment / Plan:  Patient is a 54-year-old female with history of depression, follicular lymphoma, syncopal episodes comes to the hospital with angioedema.  She was flying from [place] to [place] yesterday and ended up stopping in [place] and going to the ER where she was given GI cocktail.    1.  Angioedema  Continue IV Decadron and IV Benadryl.  Epinephrine as needed.  Patient saw allergist in the past and could not find the allergen.  Not sure if it is a delayed reaction from the GI cocktail which was given yesterday in [place]    2.  Depression  Patient takes Vyvanse and Wellbutrin at home.    3.  History of follicular lymphoma  Patient had a chemotherapy done in the past.    4.  History of syncopal episodes  As per the patient she had syncopal episodes last year and no etiology was found.  If it will happen again I recommended her to see cardiologist and probably need Holter monitoring.  She will also need to see neurology, MRI of the brain as well as EEG.  She denies any fainting spells recently.       I have personally reviewed the radiographs, laboratory data in Epic and decisions and statements above are based partially on this personal interpretation.    Code Status: Full Code  DVT Prophylaxis: Lovenox  GI Prophylaxis: PPI       Subjective:   CHIEF COMPLAINT: \"Tongue    Substance Use Topics    Alcohol use: Not Currently     Alcohol/week: 4.2 standard drinks of alcohol      Family History   Problem Relation Age of Onset    Cancer Mother         breast-63    Breast Cancer Mother 63    Stroke Father     Diabetes Neg Hx     Heart Disease Neg Hx     Hypertension Neg Hx         No Known Allergies     Prior to Admission medications    Medication Sig Start Date End Date Taking? Authorizing Provider   buPROPion (WELLBUTRIN XL) 150 MG extended release tablet 150 mg every morning. 3/8/18   Automatic Reconciliation, Ar   clonazePAM (KLONOPIN) 0.5 MG tablet ceived the following from [facility]: Outside name: clonazePAM (KlonoPIN) 0.5 mg tablet 11/17/21   Automatic Reconciliation, Ar   Lisdexamfetamine Dimesylate (VYVANSE) 60 MG CAPS 50 mg. ceived the following from [facility]: Outside name: Vyvanse 60 mg capsule 6/8/22   Automatic Reconciliation, Ar       REVIEW OF SYSTEMS:  See HPI for details  General:  negative for fever, chills, sweats, weakness, weight loss  Eyes: negative for blurred vision, eye pain, loss of vision, diplopia  Ear Nose and Throat: Positive for tongue swelling and anterior neck swelling.  Respiratory:  negative for pleuritic pain, cough, sputum production, wheezing, SOB, LOWERY  Cardiology:  negative for chest pain, palpitations, orthopnea, PND, edema, syncope   Gastrointestinal: negative for abdominal pain, N/V, dysphagia, change in bowel habits, bleeding  Genitourinary: negative for frequency, urgency, dysuria, hematuria, incontinence  Muskuloskeletal : negative for arthralgia, myalgia  Hematology: negative for easy bruising, bleeding, lymphadenopathy  Dermatological: negative for rash, ulceration, mole change, new lesion  Endocrine: negative for hot flashes or polydipsia  Neurological: negative for headache, dizziness, confusion, focal weakness, paresthesia, memory loss, gait disturbance  Psychological: negative for anxiety, depression,

## 2024-04-18 NOTE — ED PROVIDER NOTES
[facility] EMERGENCY DEPT  EMERGENCY DEPARTMENT ENCOUNTER      Pt Name: Briseyda Munoz  MRN: [id number]  Birthdate 1970  Date of evaluation: 4/18/2024  Provider: Willian Nicole MD    CHIEF COMPLAINT       Chief Complaint   Patient presents with    Allergic Reaction         HISTORY OF PRESENT ILLNESS   (Location/Symptom, Timing/Onset, Context/Setting, Quality, Duration, Modifying Factors, Severity)  Note limiting factors.   54F w/ hx follicular lymphoma p/w 30min throat swelling. Pt reports 30min voice changes, throat swelling. Noticed swollen uvula as well as swelling below her tongue and anterior neck. Yesterday had mid chest pressure while at the airport flying from [place]. Denies SOB, F/C, N/V/D. No cough, wheezing, rash. No new meds, exposures, foods, etc. Pt states that has had two previous episodes of this and previously seen allergist. Took 100mg benadryl shortly after this started w/o improvement so came to ED.            Review of External Medical Records:     Nursing Notes were reviewed.    REVIEW OF SYSTEMS    (2-9 systems for level 4, 10 or more for level 5)     Review of Systems   Constitutional:  Negative for diaphoresis and fever.   HENT:  Positive for sore throat, trouble swallowing and voice change. Negative for nosebleeds.    Eyes:  Negative for visual disturbance.   Respiratory:  Negative for cough and shortness of breath.    Cardiovascular:  Positive for chest pain. Negative for palpitations and leg swelling.   Gastrointestinal:  Negative for abdominal distention, abdominal pain, anal bleeding, blood in stool, diarrhea, nausea and vomiting.   Endocrine: Negative for polyuria.   Genitourinary:  Negative for difficulty urinating, dysuria, frequency and hematuria.   Musculoskeletal:  Negative for joint swelling.   Skin:  Negative for wound.   Allergic/Immunologic: Negative for immunocompromised state.   Neurological:  Negative for seizures and syncope.   Hematological:  Does not bruise/bleed  using previous equations.  The CKD-EPI equation is less accurate in patients with extremes of muscle mass, extra-renal metabolism of creatinine, excessive creatine ingestion, or following therapy that affects renal tubular secretion.      Calcium 04/18/2024 9.3  8.5 - 10.1 MG/DL Final    Total Bilirubin 04/18/2024 0.9  0.2 - 1.0 MG/DL Final    ALT 04/18/2024 26  12 - 78 U/L Final    AST 04/18/2024 26  15 - 37 U/L Final    HEMOLYZED SPECIMEN    Alk Phosphatase 04/18/2024 123 (H)  45 - 117 U/L Final    Total Protein 04/18/2024 7.5  6.4 - 8.2 g/dL Final    Albumin 04/18/2024 4.2  3.5 - 5.0 g/dL Final    Globulin 04/18/2024 3.3  2.0 - 4.0 g/dL Final    Albumin/Globulin Ratio 04/18/2024 1.3  1.1 - 2.2   Final    Troponin, High Sensitivity 04/18/2024 21  0 - 51 ng/L Final    Comment: A HS troponin value change of (+ or -) 50% or more below the 99th percentile, in a 1/2/3 hr interval represents a significant change. Clinical correlation is recommended.  A HS troponin value change of (+ or -) 20% or above the 99th percentile, in a 1/2/3 hr interval represents a significant change. Clinical correlation is recommended.  99th Percentile:   Women: 0-51 ng/L                                                                Men:   0-76 ng/L  Patients taking more than 20 mg/day of biotin may have falsely negative results and should not use this test.      Ventricular Rate 04/18/2024 85  BPM Final    Atrial Rate 04/18/2024 85  BPM Final    P-R Interval 04/18/2024 122  ms Final    QRS Duration 04/18/2024 96  ms Final    Q-T Interval 04/18/2024 388  ms Final    QTc Calculation (Bazett) 04/18/2024 461  ms Final    P Axis 04/18/2024 45  degrees Final    R Axis 04/18/2024 26  degrees Final    T Axis 04/18/2024 51  degrees Final    Diagnosis 04/18/2024    Final                    Value:Normal sinus rhythm  Normal ECG    Confirmed by JOVANI Ballesteros John ([id number]) on 4/19/2024 12:54:46 PM      Sodium 04/19/2024 141  136 - 145 mmol/L Final

## 2024-04-18 NOTE — ED NOTES
TRANSFER - OUT REPORT:    Verbal report given to [facility] staff on Briseyda Munoz  being transferred to [facility] ED for routine progression of patient care       Report consisted of patient's Situation, Background, Assessment and   Recommendations(SBAR).     Information from the following report(s) ED Encounter Summary was reviewed with the receiving nurse.    [place] [facility] Assessment:    Presents to emergency department  because of falls (Syncope, seizure, or loss of consciousness): No  Age > 70: No  Altered Mental Status, Intoxication with alcohol or substance confusion (Disorientation, impaired judgment, poor safety awaremess, or inability to follow instructions): No  Impaired Mobility: Ambulates or transfers with assistive devices or assistance; Unable to ambulate or transer.: No  Nursing Judgement: Yes          Lines:   Peripheral IV 04/18/24 Left Antecubital (Active)        Opportunity for questions and clarification was provided.      Patient transported with:  Monitor

## 2024-04-18 NOTE — ED NOTES
TRANSFER - OUT REPORT:    Verbal report given to Rosy LANE on Briseyda Munoz  being transferred to [facility] ED for routine progression of patient care       Report consisted of patient's Situation, Background, Assessment and   Recommendations(SBAR).     Information from the following report(s) ED Encounter Summary was reviewed with the receiving nurse.    [place] Assessment:    Presents to emergency department  because of falls (Syncope, seizure, or loss of consciousness): No  Age > 70: No  Altered Mental Status, Intoxication with alcohol or substance confusion (Disorientation, impaired judgment, poor safety awaremess, or inability to follow instructions): No  Impaired Mobility: Ambulates or transfers with assistive devices or assistance; Unable to ambulate or transer.: No  Nursing Judgement: Yes          Lines:   Peripheral IV 04/18/24 Left Antecubital (Active)        Opportunity for questions and clarification was provided.      Patient transported with:  Monitor

## 2024-04-18 NOTE — ED TRIAGE NOTES
Ambulatory to treatment area with steady gait reports, 30 minutes prior to arrival throat began to close with tongue swelling history of same in the past with unknown allergen 2 50 mg benadryl prior to arrival

## 2024-04-19 VITALS
HEIGHT: 67 IN | HEART RATE: 76 BPM | DIASTOLIC BLOOD PRESSURE: 66 MMHG | BODY MASS INDEX: 21.97 KG/M2 | TEMPERATURE: 98.6 F | OXYGEN SATURATION: 99 % | WEIGHT: 140 LBS | SYSTOLIC BLOOD PRESSURE: 112 MMHG | RESPIRATION RATE: 16 BRPM

## 2024-04-19 LAB
ANION GAP SERPL CALC-SCNC: 3 MMOL/L (ref 5–15)
BASOPHILS # BLD: 0 K/UL (ref 0–0.1)
BASOPHILS NFR BLD: 0 % (ref 0–1)
BUN SERPL-MCNC: 19 MG/DL (ref 6–20)
BUN/CREAT SERPL: 20 (ref 12–20)
CALCIUM SERPL-MCNC: 9.2 MG/DL (ref 8.5–10.1)
CHLORIDE SERPL-SCNC: 111 MMOL/L (ref 97–108)
CO2 SERPL-SCNC: 27 MMOL/L (ref 21–32)
CREAT SERPL-MCNC: 0.94 MG/DL (ref 0.55–1.02)
DIFFERENTIAL METHOD BLD: ABNORMAL
EKG ATRIAL RATE: 85 BPM
EKG DIAGNOSIS: NORMAL
EKG P AXIS: 45 DEGREES
EKG P-R INTERVAL: 122 MS
EKG Q-T INTERVAL: 388 MS
EKG QRS DURATION: 96 MS
EKG QTC CALCULATION (BAZETT): 461 MS
EKG R AXIS: 26 DEGREES
EKG T AXIS: 51 DEGREES
EKG VENTRICULAR RATE: 85 BPM
EOSINOPHIL # BLD: 0 K/UL (ref 0–0.4)
EOSINOPHIL NFR BLD: 0 % (ref 0–7)
ERYTHROCYTE [DISTWIDTH] IN BLOOD BY AUTOMATED COUNT: 11.6 % (ref 11.5–14.5)
GLUCOSE SERPL-MCNC: 125 MG/DL (ref 65–100)
HCT VFR BLD AUTO: 38.5 % (ref 35–47)
HGB BLD-MCNC: 13.1 G/DL (ref 11.5–16)
IMM GRANULOCYTES # BLD AUTO: 0 K/UL (ref 0–0.04)
IMM GRANULOCYTES NFR BLD AUTO: 0 % (ref 0–0.5)
LYMPHOCYTES # BLD: 1.2 K/UL (ref 0.8–3.5)
LYMPHOCYTES NFR BLD: 13 % (ref 12–49)
MCH RBC QN AUTO: 33 PG (ref 26–34)
MCHC RBC AUTO-ENTMCNC: 34 G/DL (ref 30–36.5)
MCV RBC AUTO: 97 FL (ref 80–99)
MONOCYTES # BLD: 0.9 K/UL (ref 0–1)
MONOCYTES NFR BLD: 9 % (ref 5–13)
NEUTS SEG # BLD: 7.6 K/UL (ref 1.8–8)
NEUTS SEG NFR BLD: 78 % (ref 32–75)
NRBC # BLD: 0 K/UL (ref 0–0.01)
NRBC BLD-RTO: 0 PER 100 WBC
PLATELET # BLD AUTO: 269 K/UL (ref 150–400)
PMV BLD AUTO: 9 FL (ref 8.9–12.9)
POTASSIUM SERPL-SCNC: 4.1 MMOL/L (ref 3.5–5.1)
RBC # BLD AUTO: 3.97 M/UL (ref 3.8–5.2)
SODIUM SERPL-SCNC: 141 MMOL/L (ref 136–145)
WBC # BLD AUTO: 9.8 K/UL (ref 3.6–11)

## 2024-04-19 PROCEDURE — 94761 N-INVAS EAR/PLS OXIMETRY MLT: CPT

## 2024-04-19 PROCEDURE — 80048 BASIC METABOLIC PNL TOTAL CA: CPT

## 2024-04-19 PROCEDURE — 36415 COLL VENOUS BLD VENIPUNCTURE: CPT

## 2024-04-19 PROCEDURE — 6370000000 HC RX 637 (ALT 250 FOR IP): Performed by: HOSPITALIST

## 2024-04-19 PROCEDURE — 2580000003 HC RX 258: Performed by: HOSPITALIST

## 2024-04-19 PROCEDURE — 93010 ELECTROCARDIOGRAM REPORT: CPT | Performed by: STUDENT IN AN ORGANIZED HEALTH CARE EDUCATION/TRAINING PROGRAM

## 2024-04-19 PROCEDURE — 85025 COMPLETE CBC W/AUTO DIFF WBC: CPT

## 2024-04-19 RX ORDER — EPINEPHRINE 0.3 MG/.3ML
0.3 INJECTION SUBCUTANEOUS ONCE
Qty: 0.3 ML | Refills: 0 | Status: SHIPPED | OUTPATIENT
Start: 2024-04-19 | End: 2024-04-19

## 2024-04-19 RX ADMIN — BUPROPION HYDROCHLORIDE 150 MG: 150 TABLET, EXTENDED RELEASE ORAL at 08:50

## 2024-04-19 RX ADMIN — SODIUM CHLORIDE, PRESERVATIVE FREE 10 ML: 5 INJECTION INTRAVENOUS at 08:53

## 2024-04-19 NOTE — PROGRESS NOTES
Discharge instructions reviewed with patient at bedside. All questions answered. Patient verbalized understanding. All belongings returned to patient prior to discharge. Patient wheeled out to private vehicle by hospital staff.

## 2024-04-19 NOTE — ACP (ADVANCE CARE PLANNING)
Advance Care Planning     Advance Care Planning Inpatient Note  [facility] Department    Today's Date: 4/19/2024  Unit: [facility] B3 INTERMEDIATE CARE UNIT    Received request from IDT Member.  Upon review of chart and communication with care team, patient's decision making abilities are not in question.. Patient was/were present in the room during visit.    Goals of ACP Conversation:  Discuss advance care planning documents    Health Care Decision Makers:       Primary Decision Maker: Kip Munoz (spouse) - Spouse - [phone]    Secondary Decision Maker: Ramez Sun (brother) - Brother/Sister - [phone]    Secondary Decision Maker: Fletcher Sun (brother) - Brother/Sister - [phone]  Summary:  Completed New Documents  Updated Healthcare Decision Maker    Advance Care Planning Documents (Patient Wishes):  Healthcare Power of /Advance Directive Appointment of Health Care Agent  Living Will/Advance Directive  Anatomical Gift/Organ Donation     Assessment:   visit for the patient on IMCU with an Advance Medical Directive (AMD) consult. Reviewed pt's chart and spoke with pt's nurse. With the pt completed an AMD that reflects her values and wishes for a time when she cannot speak for herself. Pt chose her spouse, Seth, as her Primary Health Care Decision Maker, and her brothers, Ramez uSn and Fletcher Sun, as Secondary Health Care Decision Makers, together.      Interventions:  Provided education on documents for clarity and greater understanding  Discussed and provided education on state decision maker hierarchy  Assisted in the completion of documents according to patient's wishes at this time    Care Preferences Communicated:   No    Outcomes/Plan:  New advance directive completed.  Returned original document(s) to patient, as well as copies for distribution to appointed agents  Copy of advance directive given to staff to scan into medical record.  Teach Back Method used to verify the

## 2024-04-19 NOTE — CARE COORDINATION
Care Management Initial Assessment Note:        04/19/24 0803   Discharge Planning   Patient expects to be discharged to: House   Services At/After Discharge   Transition of Care Consult (CM Consult) N/A   Mode of Transport at Discharge Other (see comment)  (family)   Confirm Follow Up Transport Self     Patient with low readmission risk score of 6%. No identified CM needs at this time. Please consult CM for any discharge planning needs that may arise.   ______________________  Rosi PEÑA, RN  Care Management  4/19/2024  8:04 AM

## 2024-04-19 NOTE — DISCHARGE SUMMARY
Discharge Summary   Please note that this dictation was completed with [employer], the computer voice recognition software.  Quite often unanticipated grammatical, syntax, homophones, and other interpretive errors are inadvertently transcribed by the computer software.  Please disregard these errors.  Please excuse any errors that have escaped final proofreading.    PATIENT ID: Briseyda Munoz  MRN: [id number]   YOB: 1970    DATE OF ADMISSION: 4/18/2024 10:01 AM    DATE OF DISCHARGE: 4/19/2024  PRIMARY CARE PROVIDER: Grace Green MD         ATTENDING PHYSICIAN: RAVINDER JOHNSON MD  DISCHARGING PROVIDER: RAVINDER JOHNSON MD       CONSULTATIONS: IP CONSULT TO SPIRITUAL SERVICES    PROCEDURES/SURGERIES: * No surgery found *    ADMITTING HPI from excerpted H&P    54 y.o.   female with PMHx angioedema, depression comes to the hospital with chief complaint of voice changes and throat swelling.  Patient has her uvula swollen as well.  She does feel swelling below her tongue and in the anterior neck.  As per the patient she has a history of angioedema but she does not know that trigger.  She denies taking any new medications or eating any new food.  She took Benadryl and it did not help her symptoms so she decided come to the ER.  She denies any shortness of breath.  She denies any nausea vomiting diarrhea constipation.  No cough no wheezing.  Patient was also complaining of some chest pressure yesterday.  She flew from [place] yesterday throat show alert and she felt really short of breath in [place].  She went to the ER and she was given a GI cocktail over there.  When she arrived to the ER her blood pressure was 131/78, pulse 98, respiratory rate 20, temperature 98.4.  Blood work showed a sodium 137, potassium 5.1, creatinine 0.9, WBC 6.3, hemoglobin 14.4, platelet count 253.  CT scan of soft tissue of the neck did not show any acute abnormality.  CTA chest did not show any pulmonary

## 2024-04-19 NOTE — PROGRESS NOTES
Spiritual Care Assessment/Progress Note  [facility]    Name: Briseyda Munoz MRN: [id number]    Age: 54 y.o.     Sex: female   Language: English     Date: 4/19/2024            Total Time Calculated: 50 min              Spiritual Assessment begun in [facility] B3 INTERMEDIATE CARE UNIT  Service Provided For:: Patient  Referral/Consult From:: Multi-disciplinary team  Encounter Overview/Reason : Advance Care Planning    Spiritual beliefs:      [] Involved in a karie tradition/spiritual practice:      [] Supported by a karie community:      [] Claims no spiritual orientation:      [] Seeking spiritual identity:           [] Adheres to an individual form of spirituality:      [x] Not able to assess:                Identified resources for coping and support system:   Support System: Spouse       [] Prayer                  [] Devotional reading               [] Music                  [] Guided Imagery     [] Pet visits                                        [] Other: (COMMENT)     Specific area/focus of visit   Encounter:    Crisis:    Spiritual/Emotional needs:    Ritual, Rites and Sacraments:    Grief, Loss, and Adjustments:    Ethics/Mediation:    Behavioral Health:    Palliative Care:    Advance Care Planning: Type: ACP conversation, Completed AD/ACP document(s)    Plan/Referrals: Other (Comment) (Please contact [facility] for further consults.)    Narrative:  visit for the patient on IMCU with an Advance Medical Directive (AMD) consult. Reviewed pt's chart and spoke with pt's nurse. No spiritual or emotional needs at this time. Shared  availability.    With the pt completed an AMD that reflects her values and wishes for a time when she cannot speak for herself. Pt chose her spouse, Seth, as her Primary Health Care Decision Maker, and her brothers, Ramez Sun and Fletcher Sun, as Secondary Health Care Decision Makers, together.     Please contact [facility] for further

## 2024-04-24 ENCOUNTER — OFFICE VISIT (OUTPATIENT)
Age: 54
End: 2024-04-24
Payer: COMMERCIAL

## 2024-04-24 VITALS
RESPIRATION RATE: 16 BRPM | HEART RATE: 77 BPM | SYSTOLIC BLOOD PRESSURE: 112 MMHG | DIASTOLIC BLOOD PRESSURE: 72 MMHG | OXYGEN SATURATION: 98 % | TEMPERATURE: 98.6 F | HEIGHT: 67 IN | WEIGHT: 138.4 LBS | BODY MASS INDEX: 21.72 KG/M2

## 2024-04-24 DIAGNOSIS — F90.9 ATTENTION DEFICIT HYPERACTIVITY DISORDER (ADHD), UNSPECIFIED ADHD TYPE: ICD-10-CM

## 2024-04-24 DIAGNOSIS — C82.90 FOLLICULAR LYMPHOMA, UNSPECIFIED FOLLICULAR LYMPHOMA TYPE, UNSPECIFIED BODY REGION (HCC): ICD-10-CM

## 2024-04-24 DIAGNOSIS — Z87.898 HISTORY OF SYNCOPE: ICD-10-CM

## 2024-04-24 DIAGNOSIS — R07.89 CHEST PRESSURE: ICD-10-CM

## 2024-04-24 DIAGNOSIS — T78.3XXS ANGIOEDEMA, SEQUELA: Primary | ICD-10-CM

## 2024-04-24 DIAGNOSIS — F32.0 CURRENT MILD EPISODE OF MAJOR DEPRESSIVE DISORDER WITHOUT PRIOR EPISODE (HCC): ICD-10-CM

## 2024-04-24 PROCEDURE — 99215 OFFICE O/P EST HI 40 MIN: CPT | Performed by: INTERNAL MEDICINE

## 2024-04-24 ASSESSMENT — PATIENT HEALTH QUESTIONNAIRE - PHQ9
4. FEELING TIRED OR HAVING LITTLE ENERGY: NOT AT ALL
7. TROUBLE CONCENTRATING ON THINGS, SUCH AS READING THE NEWSPAPER OR WATCHING TELEVISION: NOT AT ALL
6. FEELING BAD ABOUT YOURSELF - OR THAT YOU ARE A FAILURE OR HAVE LET YOURSELF OR YOUR FAMILY DOWN: NOT AT ALL
SUM OF ALL RESPONSES TO PHQ QUESTIONS 1-9: 0
SUM OF ALL RESPONSES TO PHQ9 QUESTIONS 1 & 2: 0
SUM OF ALL RESPONSES TO PHQ QUESTIONS 1-9: 0
3. TROUBLE FALLING OR STAYING ASLEEP: NOT AT ALL
8. MOVING OR SPEAKING SO SLOWLY THAT OTHER PEOPLE COULD HAVE NOTICED. OR THE OPPOSITE, BEING SO FIGETY OR RESTLESS THAT YOU HAVE BEEN MOVING AROUND A LOT MORE THAN USUAL: NOT AT ALL
SUM OF ALL RESPONSES TO PHQ QUESTIONS 1-9: 0
9. THOUGHTS THAT YOU WOULD BE BETTER OFF DEAD, OR OF HURTING YOURSELF: NOT AT ALL
5. POOR APPETITE OR OVEREATING: NOT AT ALL
10. IF YOU CHECKED OFF ANY PROBLEMS, HOW DIFFICULT HAVE THESE PROBLEMS MADE IT FOR YOU TO DO YOUR WORK, TAKE CARE OF THINGS AT HOME, OR GET ALONG WITH OTHER PEOPLE: NOT DIFFICULT AT ALL
1. LITTLE INTEREST OR PLEASURE IN DOING THINGS: NOT AT ALL
SUM OF ALL RESPONSES TO PHQ QUESTIONS 1-9: 0
2. FEELING DOWN, DEPRESSED OR HOPELESS: NOT AT ALL

## 2024-04-24 NOTE — PROGRESS NOTES
Briseyda Munoz (:  1970) is a 54 y.o. female,Established patient, here for evaluation of the following chief complaint(s):  Follow-Up from Hospital (; angioedema; chest pressure)      Assessment & Plan   1. Angioedema, sequela  2. Follicular lymphoma, unspecified follicular lymphoma type, unspecified body region (HCC)  3. History of syncope  -     [facility] - Sarah Hightower MD, Cardiology, [place]  4. Attention deficit hyperactivity disorder (ADHD), unspecified ADHD type  5. Current mild episode of major depressive disorder without prior episode (HCC)  6. Chest pressure  -     [facility] - Sarah Hightower MD, Cardiology, [place]  Allergist?    Refer cards? Etiology   Sees psych vyvanse  Wellbutrin, clonazepam    No follow-ups on file.     Assessment & Plan  1. Idiopathic angioedema.  The patient is advised to maintain her follow-up appointment with the allergist.    2. Syncope.  Given the absence of any loss of consciousness, it is unlikely that her symptoms are indicative of a seizure. A referral to a cardiologist will be made for further evaluation.  I think she may benefit from a Holter monitor and a cardiac workup given these nearly fainting episodes that she feels on a monthly basis, and I do not feel like it is anxiety related as she is not stressed and mood is well-controlled    3. Follicular lymphoma.  The patient is scheduled for a follow-up appointment with Dr. Marroquin.    4.  Depression-continue current dose of Wellbutrin and takes Klonopin as needed rarely and also has Vyvanse for treatment of attention deficit     Subjective   HPI  Patient had presented to the ER on  with a feeling of voice changes and throat swelling and her uvula was swollen.  She felt swelling below her tongue and the anterior neck.  She had a history of angioedema but never knew the trigger in the past.  She took Benadryl and did not help her symptoms.  She had no cough or wheezing but some chest

## 2024-04-30 DIAGNOSIS — C82.13 FOLLICULAR LYMPHOMA GRADE II, INTRA-ABDOMINAL LYMPH NODES (HCC): Primary | ICD-10-CM

## 2024-05-06 DIAGNOSIS — C82.13 FOLLICULAR LYMPHOMA GRADE II, INTRA-ABDOMINAL LYMPH NODES (HCC): ICD-10-CM

## 2024-05-07 ENCOUNTER — HOSPITAL ENCOUNTER (OUTPATIENT)
Facility: HOSPITAL | Age: 54
Discharge: HOME | End: 2024-05-10
Payer: COMMERCIAL

## 2024-05-07 DIAGNOSIS — C82.13 FOLLICULAR LYMPHOMA GRADE II, INTRA-ABDOMINAL LYMPH NODES (HCC): ICD-10-CM

## 2024-05-07 PROCEDURE — 6360000004 HC RX CONTRAST MEDICATION: Performed by: NURSE PRACTITIONER

## 2024-05-07 PROCEDURE — 74177 CT ABD & PELVIS W/CONTRAST: CPT

## 2024-05-07 RX ADMIN — IOPAMIDOL 100 ML: 755 INJECTION, SOLUTION INTRAVENOUS at 09:16

## 2024-05-08 NOTE — PROGRESS NOTES
[facility] [place]  Medical Oncology at [place]  [phone]    Hematology / Oncology Established Visit    Reason for Visit:   Briseyda Munoz is a 54 y.o. female who is seen for follow up of lymphoma. PCP is Dr. Grace Sauer.    Hematology Oncology Treatment History:     Diagnosis: Follicular lymphoma    Stage: III; FLIPI2 low risk    Pathology:   -1/20/20 Right inguinal LN excision: Follicular lymphoma, grade 1-2, follicular pattern, CD20 positive.  Comment:   The LN specimen demonstrates increased follicles, lacking normal germinal centers. IHC markers are performed with good controls. Tumor cells are positive for CD20 and BCL2. CD3 marks background T cells. Cyclin D1 is negative in the lymphoid population. CD21 highlights intact dendritic meshworks. Flow cytometry shows positive expression of CD10 and kappa LC restriction. Ki-67 is pending. Focally there are a few follicles with greater than fifteen centroblasts per HPF; however the majority of follicles have less than 15 centroblasts/HPF, consistent with grade 1-2. Diffuse areas are not seen.    -2/5/20 Bone marrow biopsy: Normocellular marrow with multilineage hematopoiesis and <1% involvement by a CD10 positive B-cell lymphoproliferative disorder. Negative for immunophenotypic or morphologic evidence of dysplasia no increase in blasts.   Comment   Flow cytometric analysis reveals a small population of CD10 positive clonal B cells compatible with involvement by the patient's recently diagnosed follicular lymphoma. FISH studies are pending and will be reported.     Prior Treatment: Bendamustine-Rituximab x 6 cycles, started 1/2022-5/31/22    Current Treatment: Surveillance    History of Present Illness:   Briseyda Munoz is a 54 y.o. female who comes in for evaluation of Follicular lymphoma. At presentation, pt reported vague symptoms for several years: h/o pruritus which started 6 yrs prior and finally subsided with Zyrtec. She saw

## 2024-05-10 LAB
ALBUMIN SERPL-MCNC: 4.1 G/DL (ref 3.5–5)
ALBUMIN/GLOB SERPL: 1.6 (ref 1.1–2.2)
ALP SERPL-CCNC: 110 U/L (ref 45–117)
ALT SERPL-CCNC: 28 U/L (ref 12–78)
ANION GAP SERPL CALC-SCNC: 5 MMOL/L (ref 5–15)
AST SERPL-CCNC: 18 U/L (ref 15–37)
BASOPHILS # BLD: 0.1 K/UL (ref 0–0.1)
BASOPHILS NFR BLD: 1 % (ref 0–1)
BILIRUB SERPL-MCNC: 0.5 MG/DL (ref 0.2–1)
BUN SERPL-MCNC: 17 MG/DL (ref 6–20)
BUN/CREAT SERPL: 18 (ref 12–20)
CALCIUM SERPL-MCNC: 9.6 MG/DL (ref 8.5–10.1)
CHLORIDE SERPL-SCNC: 105 MMOL/L (ref 97–108)
CO2 SERPL-SCNC: 28 MMOL/L (ref 21–32)
CREAT SERPL-MCNC: 0.92 MG/DL (ref 0.55–1.02)
DIFFERENTIAL METHOD BLD: NORMAL
EOSINOPHIL # BLD: 0.2 K/UL (ref 0–0.4)
EOSINOPHIL NFR BLD: 4 % (ref 0–7)
ERYTHROCYTE [DISTWIDTH] IN BLOOD BY AUTOMATED COUNT: 11.7 % (ref 11.5–14.5)
GLOBULIN SER CALC-MCNC: 2.6 G/DL (ref 2–4)
GLUCOSE SERPL-MCNC: 98 MG/DL (ref 65–100)
HCT VFR BLD AUTO: 38.3 % (ref 35–47)
HGB BLD-MCNC: 13 G/DL (ref 11.5–16)
IMM GRANULOCYTES # BLD AUTO: 0 K/UL (ref 0–0.04)
IMM GRANULOCYTES NFR BLD AUTO: 0 % (ref 0–0.5)
LDH SERPL L TO P-CCNC: 166 U/L (ref 81–246)
LYMPHOCYTES # BLD: 1.4 K/UL (ref 0.8–3.5)
LYMPHOCYTES NFR BLD: 28 % (ref 12–49)
MCH RBC QN AUTO: 32.3 PG (ref 26–34)
MCHC RBC AUTO-ENTMCNC: 33.9 G/DL (ref 30–36.5)
MCV RBC AUTO: 95.3 FL (ref 80–99)
MONOCYTES # BLD: 0.6 K/UL (ref 0–1)
MONOCYTES NFR BLD: 13 % (ref 5–13)
NEUTS SEG # BLD: 2.7 K/UL (ref 1.8–8)
NEUTS SEG NFR BLD: 54 % (ref 32–75)
NRBC # BLD: 0 K/UL (ref 0–0.01)
NRBC BLD-RTO: 0 PER 100 WBC
PLATELET # BLD AUTO: 264 K/UL (ref 150–400)
PMV BLD AUTO: 9.3 FL (ref 8.9–12.9)
POTASSIUM SERPL-SCNC: 4.4 MMOL/L (ref 3.5–5.1)
PROT SERPL-MCNC: 6.7 G/DL (ref 6.4–8.2)
RBC # BLD AUTO: 4.02 M/UL (ref 3.8–5.2)
SODIUM SERPL-SCNC: 138 MMOL/L (ref 136–145)
WBC # BLD AUTO: 4.9 K/UL (ref 3.6–11)

## 2024-05-16 ENCOUNTER — OFFICE VISIT (OUTPATIENT)
Age: 54
End: 2024-05-16

## 2024-05-16 VITALS
HEIGHT: 67 IN | OXYGEN SATURATION: 99 % | BODY MASS INDEX: 22.6 KG/M2 | TEMPERATURE: 97.1 F | WEIGHT: 144 LBS | DIASTOLIC BLOOD PRESSURE: 71 MMHG | RESPIRATION RATE: 18 BRPM | HEART RATE: 76 BPM | SYSTOLIC BLOOD PRESSURE: 113 MMHG

## 2024-05-16 DIAGNOSIS — Z71.85 VACCINE COUNSELING: ICD-10-CM

## 2024-05-16 DIAGNOSIS — M81.0 OSTEOPOROSIS WITHOUT CURRENT PATHOLOGICAL FRACTURE, UNSPECIFIED OSTEOPOROSIS TYPE: ICD-10-CM

## 2024-05-16 DIAGNOSIS — Z87.898 HISTORY OF ANGIOEDEMA: ICD-10-CM

## 2024-05-16 DIAGNOSIS — C82.13 FOLLICULAR LYMPHOMA GRADE II, INTRA-ABDOMINAL LYMPH NODES (HCC): Primary | ICD-10-CM

## 2024-05-16 ASSESSMENT — PATIENT HEALTH QUESTIONNAIRE - PHQ9
1. LITTLE INTEREST OR PLEASURE IN DOING THINGS: NOT AT ALL
SUM OF ALL RESPONSES TO PHQ9 QUESTIONS 1 & 2: 0
SUM OF ALL RESPONSES TO PHQ QUESTIONS 1-9: 0
2. FEELING DOWN, DEPRESSED OR HOPELESS: NOT AT ALL

## 2024-05-16 NOTE — PROGRESS NOTES
Chief Complaint   Patient presents with    Follow-up           Vitals:    05/16/24 0835   BP: 113/71   Pulse: 76   Resp: 18   Temp: 97.1 °F (36.2 °C)   SpO2: 99%            1. Have you been to the ER, urgent care clinic since your last visit?  Hospitalized since your last visit?  Yes [facility] [place], Chest pain.  [facility], Anaphylactic response unknown etiology  2. Have you seen or consulted any other health care providers outside of the [facility] since your last visit?  Include any pap smears or colon screening. Yes Allergist ..[facility]

## 2024-07-15 ENCOUNTER — OFFICE VISIT (OUTPATIENT)
Age: 54
End: 2024-07-15
Payer: COMMERCIAL

## 2024-07-15 VITALS
OXYGEN SATURATION: 99 % | HEART RATE: 82 BPM | HEIGHT: 67 IN | DIASTOLIC BLOOD PRESSURE: 60 MMHG | WEIGHT: 147 LBS | SYSTOLIC BLOOD PRESSURE: 108 MMHG | BODY MASS INDEX: 23.07 KG/M2

## 2024-07-15 DIAGNOSIS — F90.9 ATTENTION DEFICIT HYPERACTIVITY DISORDER (ADHD), UNSPECIFIED ADHD TYPE: ICD-10-CM

## 2024-07-15 DIAGNOSIS — C82.13 FOLLICULAR LYMPHOMA GRADE II OF INTRA-ABDOMINAL LYMPH NODES (HCC): ICD-10-CM

## 2024-07-15 DIAGNOSIS — T78.3XXS ANGIOEDEMA, SEQUELA: ICD-10-CM

## 2024-07-15 DIAGNOSIS — R06.02 SHORTNESS OF BREATH: ICD-10-CM

## 2024-07-15 DIAGNOSIS — R07.9 CHEST PAIN, UNSPECIFIED TYPE: Primary | ICD-10-CM

## 2024-07-15 PROCEDURE — 93000 ELECTROCARDIOGRAM COMPLETE: CPT | Performed by: INTERNAL MEDICINE

## 2024-07-15 PROCEDURE — 99204 OFFICE O/P NEW MOD 45 MIN: CPT | Performed by: INTERNAL MEDICINE

## 2024-07-15 RX ORDER — ATENOLOL 25 MG/1
TABLET ORAL
Qty: 3 TABLET | Refills: 0 | Status: SHIPPED | OUTPATIENT
Start: 2024-07-15

## 2024-07-15 NOTE — PROGRESS NOTES
Chief Complaint   Patient presents with    Chest Pain    New Patient    Loss of Consciousness     Vitals:    07/15/24 0851   BP: 108/60   Site: Left Upper Arm   Position: Sitting   Pulse: 82   SpO2: 99%   Weight: 66.7 kg (147 lb)   Height: 1.702 m (5' 7\")     Chest pain: DENIED     Recent hospital stays: DENIED     Refills: DENIED     LEGS SWELLING, LEGS HEAVY WHEN WALKING   
Per Dr. Hightower- echo, CTA and follow up.  
  Procedure Laterality Date    BREAST BIOPSY Bilateral 20 + yrs ago    neg    CT BONE MARROW BIOPSY  2020    CT BONE MARROW BIOPSY 2020 [facility] RAD CT    HEENT      wisdom teeth extraction       CURRENT MEDICATIONS:    .  Current Outpatient Medications   Medication Sig Dispense Refill    EPINEPHrine (EPIPEN 2-ELVIS) 0.3 MG/0.3ML SOAJ injection Inject 0.3 mLs into the skin once for 1 dose Use as directed for allergic reaction 0.3 mL 0    buPROPion (WELLBUTRIN XL) 150 MG extended release tablet 150 mg every morning.      clonazePAM (KLONOPIN) 0.5 MG tablet ceived the following from [facility]: Outside name: clonazePAM (KlonoPIN) 0.5 mg tablet      Lisdexamfetamine Dimesylate (VYVANSE) 60 MG CAPS 50 mg. ceived the following from [facility]: Outside name: Vyvanse 60 mg capsule       No current facility-administered medications for this visit.       No Known Allergies    SOCIAL HISTORY:     Social History     Tobacco Use    Smoking status: Former     Current packs/day: 0.00     Types: Cigarettes     Quit date: 2000     Years since quittin.4    Smokeless tobacco: Never   Vaping Use    Vaping Use: Never used   Substance Use Topics    Alcohol use: Not Currently     Alcohol/week: 4.2 standard drinks of alcohol    Drug use: No       FAMILY HISTORY:     Family History   Problem Relation Age of Onset    Cancer Mother         breast-63    Breast Cancer Mother 63    Stroke Father     Diabetes Neg Hx     Heart Disease Neg Hx     Hypertension Neg Hx        REVIEW OF SYMPTOMS:     Review of Symptoms:  Negative except as above, all other systems reviewed and are negative for a Comprehensive ROS (10+)    PHYSICAL EXAM:     Physical Exam:  There were no vitals taken for this visit.    General: alert, cooperative, no distress, appears stated age  Neck: supple, symmetrical, trachea midline, no adenopathy, thyroid: not enlarged, symmetric, no tenderness/mass/nodules, no carotid bruit, and no

## 2024-08-22 ENCOUNTER — PATIENT MESSAGE (OUTPATIENT)
Age: 54
End: 2024-08-22

## 2024-09-04 ENCOUNTER — PATIENT MESSAGE (OUTPATIENT)
Age: 54
End: 2024-09-04

## 2024-09-26 ENCOUNTER — PATIENT MESSAGE (OUTPATIENT)
Age: 54
End: 2024-09-26

## 2024-10-02 ENCOUNTER — HOSPITAL ENCOUNTER (OUTPATIENT)
Facility: HOSPITAL | Age: 54
Discharge: HOME | End: 2024-10-05
Attending: INTERNAL MEDICINE
Payer: COMMERCIAL

## 2024-10-02 VITALS
RESPIRATION RATE: 15 BRPM | SYSTOLIC BLOOD PRESSURE: 93 MMHG | OXYGEN SATURATION: 99 % | DIASTOLIC BLOOD PRESSURE: 56 MMHG | HEART RATE: 54 BPM

## 2024-10-02 DIAGNOSIS — R07.9 CHEST PAIN, UNSPECIFIED TYPE: ICD-10-CM

## 2024-10-02 DIAGNOSIS — F90.9 ATTENTION DEFICIT HYPERACTIVITY DISORDER (ADHD), UNSPECIFIED ADHD TYPE: ICD-10-CM

## 2024-10-02 DIAGNOSIS — C82.13 FOLLICULAR LYMPHOMA GRADE II OF INTRA-ABDOMINAL LYMPH NODES (HCC): ICD-10-CM

## 2024-10-02 DIAGNOSIS — R06.02 SHORTNESS OF BREATH: ICD-10-CM

## 2024-10-02 DIAGNOSIS — T78.3XXS ANGIOEDEMA, SEQUELA: ICD-10-CM

## 2024-10-02 PROCEDURE — 75574 CT ANGIO HRT W/3D IMAGE: CPT

## 2024-10-02 PROCEDURE — 75574 CT ANGIO HRT W/3D IMAGE: CPT | Performed by: INTERNAL MEDICINE

## 2024-10-02 PROCEDURE — 6360000004 HC RX CONTRAST MEDICATION: Performed by: INTERNAL MEDICINE

## 2024-10-02 RX ORDER — NITROGLYCERIN 0.4 MG/1
0.8 TABLET SUBLINGUAL EVERY 5 MIN PRN
Status: DISCONTINUED | OUTPATIENT
Start: 2024-10-02 | End: 2024-10-06 | Stop reason: HOSPADM

## 2024-10-02 RX ORDER — METOPROLOL TARTRATE 1 MG/ML
5 INJECTION, SOLUTION INTRAVENOUS
Status: DISCONTINUED | OUTPATIENT
Start: 2024-10-02 | End: 2024-10-06 | Stop reason: HOSPADM

## 2024-10-02 RX ORDER — IOPAMIDOL 755 MG/ML
80 INJECTION, SOLUTION INTRAVASCULAR
Status: COMPLETED | OUTPATIENT
Start: 2024-10-02 | End: 2024-10-02

## 2024-10-02 RX ADMIN — IOPAMIDOL 80 ML: 755 INJECTION, SOLUTION INTRAVENOUS at 09:56

## 2024-10-02 NOTE — PROGRESS NOTES
0915- Pt walked back to radiology for CT cardiac morphology study. Pt is A&Ox 4 with no C/O pain. Baseline VS taken.   0935- Ultrasound IV by Erika LANE :  Procedure Note  Ultrasound IV education provided to patient. Opportunities for questions given.   Ultrasound used for PIV placement:  20 gauge 8 cm PowerGlide Pro 8cm  right cephalic location.  2 X Attempt(s).  Vigorous blood return present and saline flushes with ease.   Procedure tolerated well.   0955- Pt BP 93/56 (65) HR 54. Nitro not given due to pt BP.   1005- Pt walked over to CT and placed supine on table.   1010- Pt tolerated the CT cardiac scan well. Pt has no C./O pain. RT Cephalic Power glide removed and gauze and coban placed over site. Pt told to hydrate well today and continue to monitor her BP at home.   1012- Pt walked out to [facility] for discharge.

## 2024-10-16 ENCOUNTER — OFFICE VISIT (OUTPATIENT)
Age: 54
End: 2024-10-16
Payer: COMMERCIAL

## 2024-10-16 VITALS
WEIGHT: 154 LBS | HEIGHT: 67 IN | OXYGEN SATURATION: 94 % | DIASTOLIC BLOOD PRESSURE: 62 MMHG | BODY MASS INDEX: 24.17 KG/M2 | HEART RATE: 60 BPM | SYSTOLIC BLOOD PRESSURE: 98 MMHG | RESPIRATION RATE: 16 BRPM

## 2024-10-16 DIAGNOSIS — R06.02 SHORTNESS OF BREATH: ICD-10-CM

## 2024-10-16 DIAGNOSIS — C82.13 FOLLICULAR LYMPHOMA GRADE II OF INTRA-ABDOMINAL LYMPH NODES (HCC): ICD-10-CM

## 2024-10-16 DIAGNOSIS — R07.9 CHEST PAIN, UNSPECIFIED TYPE: Primary | ICD-10-CM

## 2024-10-16 DIAGNOSIS — T78.3XXS ANGIOEDEMA, SEQUELA: ICD-10-CM

## 2024-10-16 PROCEDURE — 99214 OFFICE O/P EST MOD 30 MIN: CPT | Performed by: INTERNAL MEDICINE

## 2024-10-16 NOTE — PROGRESS NOTES
Patient: Briseyda Munoz  : 1970    Primary Cardiologist: Sarah Hightower MD  EP Cardiologist:  PCP: Grace Green MD    Today's Date: 10/16/2024      ASSESSMENT AND PLAN:     Assessment and Plan:  Chest pain  Unusual, and I question whether a start of a bronchospasm event in light of her subsequent anaphylaxis  Encouraged Immunology FU @ [facility]  Will do coronary CTA + echo to cover cardiac bases. Although she is low risk  - Mild calcified plaque of the ostial and proximal diagonal vessel without any  significant luminal stenosis.  -CAD RADS 1/P1  CCS 43    Echo:    Left Ventricle: Normal left ventricular systolic function with a visually estimated EF of 55 - 60%. EF by 2D Simpsons Biplane is 57%. Left ventricle size is normal. Normal wall thickness. Normal wall motion. Normal diastolic function.    Tricuspid Valve: Trace regurgitation. Unable to assess RVSP due to inadequate or insignificant tricuspid regurgitation.      2.  Follicular lymphoma  4 years ago  Chemo finished two years   Dr. Marroquin    3. Idiopathic angioedema  Has appt with [facility]      Follow up as needed.  Holter is palpitations return.      No diagnosis found.    HISTORY OF PRESENT ILLNESS:     History of Present Illness:  Briseyda Munoz is a 54 y.o. female referred for chest pain.    2023 vagal type symptoms - ED visit.      Anaphylaxis episodes - no known trigger, 4-5 times.      NO tobacco history.    FH - GM MI .  Father CVAs.  Brother dies of heart attacks - obese 400+ pounds.      Trip to [place] 2024 - feels kicking in her chest.  Tinge of pain along sternal border.  Feels like a vein that will explode.  Feels like neck is full going to explode.      Legs swelling.  Feels like tree trunks.  Left leg felt very swollen.      Allergy / Immunology has evaluated - no explanation.      Exertion ok - leg heavy.      EKG Nsr no acute changed.     Woke up to come home - chest didn't feel right -

## 2024-10-16 NOTE — PROGRESS NOTES
had concerns including Chest Pain, Shortness of Breath, and Other (Angio edema /Echo 8/30).    Vitals:    10/16/24 1435   BP: 98/62   Site: Left Upper Arm   Position: Sitting   Pulse: 60   Resp: 16   SpO2: 94%   Weight: 69.9 kg (154 lb)   Height: 1.702 m (5' 7\")        Chest pain No    Refills No        1. Have you been to the ER, urgent care clinic since your last visit? No       Hospitalized since your last visit? No       Where?        Date?

## 2024-11-12 DIAGNOSIS — C82.13 FOLLICULAR LYMPHOMA GRADE II, INTRA-ABDOMINAL LYMPH NODES (HCC): ICD-10-CM

## 2024-11-12 LAB
ALBUMIN SERPL-MCNC: 4.2 G/DL (ref 3.5–5)
ALBUMIN/GLOB SERPL: 1.6 (ref 1.1–2.2)
ALP SERPL-CCNC: 104 U/L (ref 45–117)
ALT SERPL-CCNC: 27 U/L (ref 12–78)
ANION GAP SERPL CALC-SCNC: 4 MMOL/L (ref 2–12)
AST SERPL-CCNC: 25 U/L (ref 15–37)
BASOPHILS # BLD: 0.1 K/UL (ref 0–0.1)
BASOPHILS NFR BLD: 1 % (ref 0–1)
BILIRUB SERPL-MCNC: 0.7 MG/DL (ref 0.2–1)
BUN SERPL-MCNC: 10 MG/DL (ref 6–20)
BUN/CREAT SERPL: 11 (ref 12–20)
CALCIUM SERPL-MCNC: 10.4 MG/DL (ref 8.5–10.1)
CHLORIDE SERPL-SCNC: 105 MMOL/L (ref 97–108)
CO2 SERPL-SCNC: 32 MMOL/L (ref 21–32)
CREAT SERPL-MCNC: 0.93 MG/DL (ref 0.55–1.02)
DIFFERENTIAL METHOD BLD: NORMAL
EOSINOPHIL # BLD: 0.1 K/UL (ref 0–0.4)
EOSINOPHIL NFR BLD: 3 % (ref 0–7)
ERYTHROCYTE [DISTWIDTH] IN BLOOD BY AUTOMATED COUNT: 11.7 % (ref 11.5–14.5)
GLOBULIN SER CALC-MCNC: 2.7 G/DL (ref 2–4)
GLUCOSE SERPL-MCNC: 113 MG/DL (ref 65–100)
HCT VFR BLD AUTO: 40.1 % (ref 35–47)
HGB BLD-MCNC: 13.6 G/DL (ref 11.5–16)
IMM GRANULOCYTES # BLD AUTO: 0 K/UL (ref 0–0.04)
IMM GRANULOCYTES NFR BLD AUTO: 0 % (ref 0–0.5)
LDH SERPL L TO P-CCNC: 154 U/L (ref 81–246)
LYMPHOCYTES # BLD: 1.2 K/UL (ref 0.8–3.5)
LYMPHOCYTES NFR BLD: 26 % (ref 12–49)
MCH RBC QN AUTO: 32.7 PG (ref 26–34)
MCHC RBC AUTO-ENTMCNC: 33.9 G/DL (ref 30–36.5)
MCV RBC AUTO: 96.4 FL (ref 80–99)
MONOCYTES # BLD: 0.4 K/UL (ref 0–1)
MONOCYTES NFR BLD: 10 % (ref 5–13)
NEUTS SEG # BLD: 2.7 K/UL (ref 1.8–8)
NEUTS SEG NFR BLD: 60 % (ref 32–75)
NRBC # BLD: 0 K/UL (ref 0–0.01)
NRBC BLD-RTO: 0 PER 100 WBC
PLATELET # BLD AUTO: 275 K/UL (ref 150–400)
PMV BLD AUTO: 9.7 FL (ref 8.9–12.9)
POTASSIUM SERPL-SCNC: 4 MMOL/L (ref 3.5–5.1)
PROT SERPL-MCNC: 6.9 G/DL (ref 6.4–8.2)
RBC # BLD AUTO: 4.16 M/UL (ref 3.8–5.2)
SODIUM SERPL-SCNC: 141 MMOL/L (ref 136–145)
WBC # BLD AUTO: 4.6 K/UL (ref 3.6–11)

## 2024-11-14 ENCOUNTER — TELEPHONE (OUTPATIENT)
Age: 54
End: 2024-11-14

## 2024-11-14 NOTE — TELEPHONE ENCOUNTER
Patient called and stated that she needed to reschedule her appt tomorrow due to her having a work conflict.

## 2024-11-21 ENCOUNTER — OFFICE VISIT (OUTPATIENT)
Age: 54
End: 2024-11-21

## 2024-11-21 VITALS
RESPIRATION RATE: 16 BRPM | OXYGEN SATURATION: 98 % | HEIGHT: 67 IN | DIASTOLIC BLOOD PRESSURE: 77 MMHG | BODY MASS INDEX: 22.76 KG/M2 | WEIGHT: 145 LBS | SYSTOLIC BLOOD PRESSURE: 118 MMHG | TEMPERATURE: 97.7 F | HEART RATE: 89 BPM

## 2024-11-21 DIAGNOSIS — Z87.898 HISTORY OF ANGIOEDEMA: ICD-10-CM

## 2024-11-21 DIAGNOSIS — M81.0 OSTEOPOROSIS WITHOUT CURRENT PATHOLOGICAL FRACTURE, UNSPECIFIED OSTEOPOROSIS TYPE: ICD-10-CM

## 2024-11-21 DIAGNOSIS — E83.52 HYPERCALCEMIA: ICD-10-CM

## 2024-11-21 DIAGNOSIS — C82.13 FOLLICULAR LYMPHOMA GRADE II, INTRA-ABDOMINAL LYMPH NODES (HCC): Primary | ICD-10-CM

## 2024-11-21 DIAGNOSIS — Z71.85 VACCINE COUNSELING: ICD-10-CM

## 2024-11-21 ASSESSMENT — PATIENT HEALTH QUESTIONNAIRE - PHQ9
SUM OF ALL RESPONSES TO PHQ QUESTIONS 1-9: 0
1. LITTLE INTEREST OR PLEASURE IN DOING THINGS: NOT AT ALL
SUM OF ALL RESPONSES TO PHQ9 QUESTIONS 1 & 2: 0
SUM OF ALL RESPONSES TO PHQ QUESTIONS 1-9: 0
2. FEELING DOWN, DEPRESSED OR HOPELESS: NOT AT ALL

## 2024-11-21 NOTE — PROGRESS NOTES
Chief Complaint   Patient presents with    Follow-up           Vitals:    11/21/24 0941   BP: 118/77   Pulse: 89   Resp: 16   Temp: 97.7 °F (36.5 °C)   SpO2: 98%            1. Have you been to the ER, urgent care clinic since your last visit?  Hospitalized since your last visit?  No  2. Have you seen or consulted any other health care providers outside of the [facility] since your last visit?  Include any pap smears or colon screening. No

## 2024-11-21 NOTE — PROGRESS NOTES
[facility] [place]  Medical Oncology at [place]  [phone]    Hematology / Oncology Established Visit    Reason for Visit:   Briseyda Munoz is a 54 y.o. female who is seen for follow up of lymphoma. PCP is Dr. Grace Sauer.    Hematology Oncology Treatment History:     Diagnosis: Follicular lymphoma    Stage: III; FLIPI2 low risk    Pathology:   -1/20/20 Right inguinal LN excision: Follicular lymphoma, grade 1-2, follicular pattern, CD20 positive.  Comment:   The LN specimen demonstrates increased follicles, lacking normal germinal centers. IHC markers are performed with good controls. Tumor cells are positive for CD20 and BCL2. CD3 marks background T cells. Cyclin D1 is negative in the lymphoid population. CD21 highlights intact dendritic meshworks. Flow cytometry shows positive expression of CD10 and kappa LC restriction. Ki-67 is pending. Focally there are a few follicles with greater than fifteen centroblasts per HPF; however the majority of follicles have less than 15 centroblasts/HPF, consistent with grade 1-2. Diffuse areas are not seen.    -2/5/20 Bone marrow biopsy: Normocellular marrow with multilineage hematopoiesis and <1% involvement by a CD10 positive B-cell lymphoproliferative disorder. Negative for immunophenotypic or morphologic evidence of dysplasia no increase in blasts.   Comment   Flow cytometric analysis reveals a small population of CD10 positive clonal B cells compatible with involvement by the patient's recently diagnosed follicular lymphoma. FISH studies are pending and will be reported.     Prior Treatment: Bendamustine-Rituximab x 6 cycles, started 1/2022-5/31/22    Current Treatment: Surveillance    History of Present Illness:   Briseyda Munoz is a 54 y.o. female who comes in for evaluation of Follicular lymphoma. At presentation, pt reported vague symptoms for several years: h/o pruritus which started 6 yrs prior and finally subsided with Zyrtec. She saw

## 2024-11-22 ENCOUNTER — TELEPHONE (OUTPATIENT)
Age: 54
End: 2024-11-22

## 2024-11-22 NOTE — TELEPHONE ENCOUNTER
Called patient to schedule follow up appt. No answer Lvm   (Return in about 6 months (around 5/21/2025) for [facility] - lymphoma f/u .

## 2024-11-25 ENCOUNTER — TELEPHONE (OUTPATIENT)
Age: 54
End: 2024-11-25

## 2024-11-25 NOTE — TELEPHONE ENCOUNTER
Called patient to schedule follow up appt. No answer Lvm   (Return in about 6 months (around 5/21/2025) for [facility] - lymphoma f/u

## 2024-12-10 ENCOUNTER — TELEPHONE (OUTPATIENT)
Age: 54
End: 2024-12-10

## 2024-12-10 NOTE — TELEPHONE ENCOUNTER
Called patient to schedule follow up appt. No answer Lvm (Return in about 6 months (around 5/21/2025) for [place] - lymphoma f/u -Mailed Letter

## 2025-04-28 NOTE — PROGRESS NOTES
[facility] VISIT NOTE  Date: May 31, 2022    Name: Alejandrina Mahmood    MRN: [id number]         : 1970    Ms. Man Arrived ambulatory and in no distress for C6D1 of Rituximab+ Bendamustine Regimen. Assessment was completed by Billie Salter RN, no acute issues at this time, no new complaints voiced. LFA 24G PIV placed by assessment nurse without difficulty, labs drawn & sent for processing. 1. Do you have any symptoms of COVID-19? SOB, coughing, fever, or generally not feeling well NO    2. Have you been exposed to COVID-19 recently? NO    3. Have you had any recent contact with family/friend that has a pending COVID test? NO       Chemotherapy Flowsheet 2022   Cycle C6D1   Date 2022   Drug / Regimen Rituximab + Bendamustine   Pre Meds given   Notes given           Ms. Man's vitals were reviewed. Patient Vitals for the past 12 hrs:   Temp Pulse Resp BP SpO2   22 1149 97.8 °F (36.6 °C) 81 18 111/69 95 %   22 1022 97.9 °F (36.6 °C) 70 -- 109/63 97 %   22 0737 97.4 °F (36.3 °C) 77 18 107/63 96 %         Lab results were obtained and reviewed. Recent Results (from the past 12 hour(s))   CBC WITH AUTOMATED DIFF    Collection Time: 22  7:48 AM   Result Value Ref Range    WBC 3.1 (L) 3.6 - 11.0 K/uL    RBC 3.76 (L) 3.80 - 5.20 M/uL    HGB 12.7 11.5 - 16.0 g/dL    HCT 36.2 35.0 - 47.0 %    MCV 96.3 80.0 - 99.0 FL    MCH 33.8 26.0 - 34.0 PG    MCHC 35.1 30.0 - 36.5 g/dL    RDW 11.8 11.5 - 14.5 %    PLATELET 344 100 - 039 K/uL    MPV 8.9 8.9 - 12.9 FL    NRBC 0.0 0  WBC    ABSOLUTE NRBC 0.00 0.00 - 0.01 K/uL    NEUTROPHILS 65 32 - 75 %    LYMPHOCYTES 9 (L) 12 - 49 %    MONOCYTES 18 (H) 5 - 13 %    EOSINOPHILS 7 0 - 7 %    BASOPHILS 1 0 - 1 %    IMMATURE GRANULOCYTES 0 0.0 - 0.5 %    ABS. NEUTROPHILS 2.0 1.8 - 8.0 K/UL    ABS. LYMPHOCYTES 0.3 (L) 0.8 - 3.5 K/UL    ABS. MONOCYTES 0.6 0.0 - 1.0 K/UL    ABS. EOSINOPHILS 0.2 0.0 - 0.4 K/UL    ABS.  BASOPHILS 0.0 0.0 - 0.1 Physical Therapy Visit    Visit Type: Daily Treatment Note  Visit: 13  Referring Provider: Darrick Montero Jr., MD  Medical Diagnosis (from order): G35 - Multiple sclerosis  (CMD)  R25.2 - Spasticity     SUBJECTIVE                                                                                                               Patient reports she is doing well.  Usually feels stronger in the am.    Functional Change: Continues to use wheeled walker at home, cane out of home.  Able to do house hold tasks.    Pain / Symptoms  - Pain rating (out of 10): Current: 0      OBJECTIVE                                                                                                                                     Treatment     Therapeutic Exercise  Nustep R4 x 10 min  Knee extension 12# x 20     Neuromuscular Re-Education  Step up to foam forward x 10 bilateral 1 hand hold assist on rail  Step up/over foam pad x 10 bilateral 1 hand hold assist on rail  Sit to stand on foam x 10 eyes open, x 10 eyes closed  Static lunge with front foot on foam pad, reach diagonally to floor and up using 1# weight x 5 bilateral   Agility ladder stepping pattern one foot in each square x 2 reps  Agility ladder stepping lateral two feet in each square x 2 reps  Standing on foam pad performing weight shifts forward and backward      Skilled input: verbal instruction/cues and tactile instruction/cues    Writer verbally educated and received verbal consent for hand placement, positioning of patient, and techniques to be performed today from patient for hand placement and palpation for techniques and therapist position for techniques as described above and how they are pertinent to the patient's plan of care.  Home Exercise Program  Access Code: [email or web address]  URL: [email or web address]  Date: 04/17/2025  Prepared by: Senia Thomas    Exercises  - Seated Table Hamstring Stretch  - 2 x daily - 7 x weekly - 2 sets - 2 reps - 30 hold  -  Gastroc Stretch on Wall  - 2 x daily - 7 x weekly - 2 sets - 2 reps - 30 hold  - Partial Lunge Matrix with Chair  - 2 x daily - 7 x weekly - 2 sets - 10 reps  - Sit to Stand Without Arm Support  - 2 x daily - 7 x weekly - 2 sets - 10 reps  - Seated Hamstring Stretch with Chair  - 2 x daily - 7 x weekly - 2 sets - 2 reps - 30 hold  - Partial Lunge Matrix with Chair  - 2 x daily - 7 x weekly - 2 sets - 10 reps  - Standing Marching  - 2 x daily - 7 x weekly - 2 sets - 10 reps  - Standing Hip Abduction with Counter Support  - 2 x daily - 7 x weekly - 2 sets - 10 reps  - Step Up  - 2 x daily - 7 x weekly - 2 sets - 10 reps  - Lateral Step Up with Counter Support  - 2 x daily - 7 x weekly - 2 sets - 10 reps      ASSESSMENT                                                                                                            Performed balance and strengthening exercises as listed.  Patient is demonstrating improved foot clearance and placement with exercises today.  Decreased difficulty with eyes closed balance, only 1 episode loss of balance from pad with eyes closed requiring CGA/min assist.  Will continue to advance as able.  Pain/symptoms after session (out of 10): 0  Education:   - Results of above outlined education: Verbalizes understanding and Demonstrates understanding    PLAN                                                                                                                           Suggestions for next session as indicated: Progress per plan of care       Therapy procedure time and total treatment time can be found documented on the Time Entry flowsheet     K/UL    ABS. IMM. GRANS. 0.0 0.00 - 0.04 K/UL    DF SMEAR SCANNED      RBC COMMENTS NORMOCYTIC, NORMOCHROMIC     METABOLIC PANEL, COMPREHENSIVE    Collection Time: 05/31/22  7:48 AM   Result Value Ref Range    Sodium 139 136 - 145 mmol/L    Potassium 3.6 3.5 - 5.1 mmol/L    Chloride 107 97 - 108 mmol/L    CO2 27 21 - 32 mmol/L    Anion gap 5 5 - 15 mmol/L    Glucose 112 (H) 65 - 100 mg/dL    BUN 10 6 - 20 MG/DL    Creatinine 0.77 0.55 - 1.02 MG/DL    BUN/Creatinine ratio 13 12 - 20      GFR est AA >60 >60 ml/min/1.73m2    GFR est non-AA >60 >60 ml/min/1.73m2    Calcium 8.7 8.5 - 10.1 MG/DL    Bilirubin, total 0.9 0.2 - 1.0 MG/DL    ALT (SGPT) 27 12 - 78 U/L    AST (SGOT) 23 15 - 37 U/L    Alk.  phosphatase 100 45 - 117 U/L    Protein, total 6.8 6.4 - 8.2 g/dL    Albumin 3.7 3.5 - 5.0 g/dL    Globulin 3.1 2.0 - 4.0 g/dL    A-G Ratio 1.2 1.1 - 2.2         Medications received:  Medications Administered     0.9% sodium chloride infusion     Admin Date  05/31/2022 Action  New Bag Dose  25 mL/hr Rate  25 mL/hr Route  IntraVENous Administered By  Gumaro Murray RN          acetaminophen (TYLENOL) tablet 650 mg     Admin Date  05/31/2022 Action  Given Dose  650 mg Route  Oral Administered By  Gumaro Murray RN          bendamustine 165 mg in 0.9% sodium chloride 50 mL, overfill volume 5 mL chemo infusion     Admin Date  05/31/2022 Action  New Bag Dose  165 mg Rate  369.6 mL/hr Route  IntraVENous Administered By  Gumaro Murray RN          dexamethasone (DECADRON) 10 mg/mL injection 10 mg     Admin Date  05/31/2022 Action  Given Dose  10 mg Route  IntraVENous Administered By  Gumaro Murray RN          diphenhydrAMINE (BENADRYL) injection 50 mg     Admin Date  05/31/2022 Action  Given Dose  50 mg Route  IntraVENous Administered By  Gumaro Murray RN          palonosetron HCl (ALOXI) injection 0.25 mg     Admin Date  05/31/2022 Action  Given Dose  0.25 mg Route  IntraVENous Administered By  Gumaro Murray RN          riTUXimab-pvvr ([place]) 683 mg in 0.9% sodium chloride 250 mL RAPID infusion     Admin Date  05/31/2022 Action  New Bag Dose  683 mg Rate  100 mL/hr Route  IntraVENous Administered By  Chris Ramirez RN           Admin Date  05/31/2022 Action  Rate Change Dose   Rate  200 mL/hr Route  IntraVENous Administered By  Chris Ramirez, ANAND                 Ms. Kieran Cobos tolerated treatment well and was discharged from [place] [address] in stable condition at 1155. PIV flushed & removed  With blood return per protocol. She is to return on  June 1, 2022 at 1000 for her next appointment.     Yvon Yates RN  May 31, 2022    Future Appointments:  Future Appointments   Date Time Provider Yvonne Swensoni   6/1/2022 10:00 [facility] 7/12/2022  8:15 AM Patricio Winter NP [facility] AMB

## 2025-05-12 ENCOUNTER — HOSPITAL ENCOUNTER (OUTPATIENT)
Facility: HOSPITAL | Age: 55
Discharge: HOME | End: 2025-05-15

## 2025-05-12 DIAGNOSIS — C82.13 FOLLICULAR LYMPHOMA GRADE II, INTRA-ABDOMINAL LYMPH NODES (HCC): ICD-10-CM

## 2025-05-12 LAB
ALBUMIN SERPL-MCNC: 4.2 G/DL (ref 3.5–5)
ALBUMIN/GLOB SERPL: 1.5 (ref 1.1–2.2)
ALP SERPL-CCNC: 101 U/L (ref 45–117)
ALT SERPL-CCNC: 28 U/L (ref 12–78)
ANION GAP SERPL CALC-SCNC: 2 MMOL/L (ref 2–12)
AST SERPL-CCNC: 22 U/L (ref 15–37)
BASOPHILS # BLD: 0.09 K/UL (ref 0–0.1)
BASOPHILS NFR BLD: 1.6 % (ref 0–1)
BILIRUB SERPL-MCNC: 0.6 MG/DL (ref 0.2–1)
BUN SERPL-MCNC: 12 MG/DL (ref 6–20)
BUN/CREAT SERPL: 14 (ref 12–20)
CALCIUM SERPL-MCNC: 10.1 MG/DL (ref 8.5–10.1)
CHLORIDE SERPL-SCNC: 107 MMOL/L (ref 97–108)
CO2 SERPL-SCNC: 30 MMOL/L (ref 21–32)
CREAT SERPL-MCNC: 0.85 MG/DL (ref 0.55–1.02)
DIFFERENTIAL METHOD BLD: ABNORMAL
EOSINOPHIL # BLD: 0.19 K/UL (ref 0–0.4)
EOSINOPHIL NFR BLD: 3.4 % (ref 0–7)
ERYTHROCYTE [DISTWIDTH] IN BLOOD BY AUTOMATED COUNT: 12.1 % (ref 11.5–14.5)
GLOBULIN SER CALC-MCNC: 2.8 G/DL (ref 2–4)
GLUCOSE SERPL-MCNC: 91 MG/DL (ref 65–100)
HCT VFR BLD AUTO: 39.2 % (ref 35–47)
HGB BLD-MCNC: 13.2 G/DL (ref 11.5–16)
IMM GRANULOCYTES # BLD AUTO: 0.01 K/UL (ref 0–0.04)
IMM GRANULOCYTES NFR BLD AUTO: 0.2 % (ref 0–0.5)
LDH SERPL L TO P-CCNC: 170 U/L (ref 81–246)
LYMPHOCYTES # BLD: 1.14 K/UL (ref 0.8–3.5)
LYMPHOCYTES NFR BLD: 20.3 % (ref 12–49)
MCH RBC QN AUTO: 32.8 PG (ref 26–34)
MCHC RBC AUTO-ENTMCNC: 33.7 G/DL (ref 30–36.5)
MCV RBC AUTO: 97.3 FL (ref 80–99)
MONOCYTES # BLD: 0.58 K/UL (ref 0–1)
MONOCYTES NFR BLD: 10.3 % (ref 5–13)
NEUTS SEG # BLD: 3.61 K/UL (ref 1.8–8)
NEUTS SEG NFR BLD: 64.2 % (ref 32–75)
NRBC # BLD: 0 K/UL (ref 0–0.01)
NRBC BLD-RTO: 0 PER 100 WBC
PLATELET # BLD AUTO: 311 K/UL (ref 150–400)
PMV BLD AUTO: 9.5 FL (ref 8.9–12.9)
POTASSIUM SERPL-SCNC: 4.4 MMOL/L (ref 3.5–5.1)
PROT SERPL-MCNC: 7 G/DL (ref 6.4–8.2)
RBC # BLD AUTO: 4.03 M/UL (ref 3.8–5.2)
SODIUM SERPL-SCNC: 139 MMOL/L (ref 136–145)
WBC # BLD AUTO: 5.6 K/UL (ref 3.6–11)

## 2025-05-20 ENCOUNTER — HOSPITAL ENCOUNTER (OUTPATIENT)
Facility: HOSPITAL | Age: 55
Discharge: HOME | End: 2025-05-23

## 2025-05-20 ENCOUNTER — OFFICE VISIT (OUTPATIENT)
Age: 55
End: 2025-05-20
Payer: COMMERCIAL

## 2025-05-20 VITALS
BODY MASS INDEX: 21.66 KG/M2 | OXYGEN SATURATION: 98 % | HEART RATE: 87 BPM | DIASTOLIC BLOOD PRESSURE: 75 MMHG | SYSTOLIC BLOOD PRESSURE: 115 MMHG | TEMPERATURE: 97.6 F | WEIGHT: 138 LBS | RESPIRATION RATE: 16 BRPM | HEIGHT: 67 IN

## 2025-05-20 DIAGNOSIS — Z87.898 HISTORY OF ANGIOEDEMA: ICD-10-CM

## 2025-05-20 DIAGNOSIS — R35.0 URINARY FREQUENCY: ICD-10-CM

## 2025-05-20 DIAGNOSIS — N95.1 SWEATS, MENOPAUSAL: ICD-10-CM

## 2025-05-20 DIAGNOSIS — C82.13 FOLLICULAR LYMPHOMA GRADE II, INTRA-ABDOMINAL LYMPH NODES (HCC): Primary | ICD-10-CM

## 2025-05-20 DIAGNOSIS — K58.0 IRRITABLE BOWEL SYNDROME WITH DIARRHEA: ICD-10-CM

## 2025-05-20 DIAGNOSIS — R41.840 POOR CONCENTRATION: ICD-10-CM

## 2025-05-20 LAB
APPEARANCE UR: CLEAR
BACTERIA URNS QL MICRO: NEGATIVE /HPF
BILIRUB UR QL: NEGATIVE
COLOR UR: ABNORMAL
EPITH CASTS URNS QL MICRO: ABNORMAL /LPF
GLUCOSE UR STRIP.AUTO-MCNC: NEGATIVE MG/DL
HGB UR QL STRIP: NEGATIVE
HYALINE CASTS URNS QL MICRO: ABNORMAL /LPF (ref 0–5)
KETONES UR QL STRIP.AUTO: ABNORMAL MG/DL
LEUKOCYTE ESTERASE UR QL STRIP.AUTO: ABNORMAL
NITRITE UR QL STRIP.AUTO: NEGATIVE
PH UR STRIP: 6 (ref 5–8)
PROT UR STRIP-MCNC: NEGATIVE MG/DL
RBC #/AREA URNS HPF: ABNORMAL /HPF (ref 0–5)
SP GR UR REFRACTOMETRY: 1.01 (ref 1–1.03)
URINE CULTURE IF INDICATED: ABNORMAL
UROBILINOGEN UR QL STRIP.AUTO: 1 EU/DL (ref 0.2–1)
WBC URNS QL MICRO: ABNORMAL /HPF (ref 0–4)

## 2025-05-20 PROCEDURE — G2211 COMPLEX E/M VISIT ADD ON: HCPCS | Performed by: INTERNAL MEDICINE

## 2025-05-20 PROCEDURE — 99214 OFFICE O/P EST MOD 30 MIN: CPT | Performed by: INTERNAL MEDICINE

## 2025-05-20 ASSESSMENT — PATIENT HEALTH QUESTIONNAIRE - PHQ9
SUM OF ALL RESPONSES TO PHQ QUESTIONS 1-9: 0
1. LITTLE INTEREST OR PLEASURE IN DOING THINGS: NOT AT ALL
SUM OF ALL RESPONSES TO PHQ QUESTIONS 1-9: 0
2. FEELING DOWN, DEPRESSED OR HOPELESS: NOT AT ALL

## 2025-05-20 NOTE — PROGRESS NOTES
[facility] [place]  Medical Oncology at [place]  [phone]    Hematology / Oncology Established Visit    Reason for Visit:   Briseyda Munoz is a 55 y.o. female who is seen for follow up of lymphoma. PCP is Dr. Grace Sauer.    Hematology Oncology Treatment History:     Diagnosis: Follicular lymphoma    Stage: III; FLIPI2 low risk    Pathology:   -1/20/20 Right inguinal LN excision: Follicular lymphoma, grade 1-2, follicular pattern, CD20 positive.  Comment:   The LN specimen demonstrates increased follicles, lacking normal germinal centers. IHC markers are performed with good controls. Tumor cells are positive for CD20 and BCL2. CD3 marks background T cells. Cyclin D1 is negative in the lymphoid population. CD21 highlights intact dendritic meshworks. Flow cytometry shows positive expression of CD10 and kappa LC restriction. Ki-67 is pending. Focally there are a few follicles with greater than fifteen centroblasts per HPF; however the majority of follicles have less than 15 centroblasts/HPF, consistent with grade 1-2. Diffuse areas are not seen.    -2/5/20 Bone marrow biopsy: Normocellular marrow with multilineage hematopoiesis and <1% involvement by a CD10 positive B-cell lymphoproliferative disorder. Negative for immunophenotypic or morphologic evidence of dysplasia no increase in blasts.   Comment   Flow cytometric analysis reveals a small population of CD10 positive clonal B cells compatible with involvement by the patient's recently diagnosed follicular lymphoma. FISH studies are pending and will be reported.     Prior Treatment: Bendamustine-Rituximab x 6 cycles, started 1/2022-5/31/22    Current Treatment: Surveillance    History of Present Illness:   Briseyda Munoz is a 55 y.o. female who comes in for evaluation of Follicular lymphoma. At presentation, pt reported vague symptoms for several years: h/o pruritus which started 6 yrs prior and finally subsided with Zyrtec. She saw

## 2025-05-20 NOTE — PROGRESS NOTES
Briseyda AHMET Tammy is a 55 y.o. female follow up for         1. Have you been to the ER, urgent care clinic since your last visit?  Hospitalized since your last visit?{no    2. Have you seen or consulted any other health care providers outside of the [facility] since your last visit?  Include any pap smears or colon screening. no

## 2025-05-21 ENCOUNTER — RESULTS FOLLOW-UP (OUTPATIENT)
Age: 55
End: 2025-05-21

## 2025-08-07 ENCOUNTER — OFFICE VISIT (OUTPATIENT)
Age: 55
End: 2025-08-07
Payer: COMMERCIAL

## 2025-08-07 VITALS
HEART RATE: 88 BPM | OXYGEN SATURATION: 98 % | SYSTOLIC BLOOD PRESSURE: 124 MMHG | DIASTOLIC BLOOD PRESSURE: 79 MMHG | RESPIRATION RATE: 16 BRPM | TEMPERATURE: 97.8 F | HEIGHT: 67 IN | BODY MASS INDEX: 21.61 KG/M2

## 2025-08-07 DIAGNOSIS — N95.1 SWEATS, MENOPAUSAL: ICD-10-CM

## 2025-08-07 DIAGNOSIS — M81.0 OSTEOPOROSIS WITHOUT CURRENT PATHOLOGICAL FRACTURE, UNSPECIFIED OSTEOPOROSIS TYPE: ICD-10-CM

## 2025-08-07 DIAGNOSIS — K58.0 IRRITABLE BOWEL SYNDROME WITH DIARRHEA: ICD-10-CM

## 2025-08-07 DIAGNOSIS — Z87.898 HISTORY OF ANGIOEDEMA: ICD-10-CM

## 2025-08-07 DIAGNOSIS — C82.13 FOLLICULAR LYMPHOMA GRADE II, INTRA-ABDOMINAL LYMPH NODES (HCC): ICD-10-CM

## 2025-08-07 DIAGNOSIS — C82.13 FOLLICULAR LYMPHOMA GRADE II, INTRA-ABDOMINAL LYMPH NODES (HCC): Primary | ICD-10-CM

## 2025-08-07 DIAGNOSIS — R59.0 SUPRACLAVICULAR ADENOPATHY: ICD-10-CM

## 2025-08-07 LAB
ALBUMIN SERPL-MCNC: 4.5 G/DL (ref 3.5–5.2)
ALBUMIN/GLOB SERPL: 2 (ref 1.1–2.2)
ALP SERPL-CCNC: 94 U/L (ref 35–104)
ALT SERPL-CCNC: 25 U/L (ref 10–35)
ANION GAP SERPL CALC-SCNC: 9 MMOL/L (ref 2–14)
AST SERPL-CCNC: 28 U/L (ref 10–35)
BASOPHILS # BLD: 0.06 K/UL (ref 0–0.1)
BASOPHILS NFR BLD: 0.9 % (ref 0–1)
BILIRUB SERPL-MCNC: 0.6 MG/DL (ref 0–1.2)
BUN SERPL-MCNC: 9 MG/DL (ref 6–20)
CALCIUM SERPL-MCNC: 9.9 MG/DL (ref 8.6–10)
CHLORIDE SERPL-SCNC: 102 MMOL/L (ref 98–107)
CO2 SERPL-SCNC: 28 MMOL/L (ref 20–29)
CREAT SERPL-MCNC: 0.73 MG/DL (ref 0.6–1)
DIFFERENTIAL METHOD BLD: NORMAL
EOSINOPHIL # BLD: 0.27 K/UL (ref 0–0.4)
EOSINOPHIL NFR BLD: 4 % (ref 0–7)
ERYTHROCYTE [DISTWIDTH] IN BLOOD BY AUTOMATED COUNT: 11.9 % (ref 11.5–14.5)
GLOBULIN SER CALC-MCNC: 2.3 G/DL (ref 2–4)
GLUCOSE SERPL-MCNC: 110 MG/DL (ref 65–100)
HCT VFR BLD AUTO: 41.7 % (ref 35–47)
HGB BLD-MCNC: 13.9 G/DL (ref 11.5–16)
IMM GRANULOCYTES # BLD AUTO: 0.02 K/UL (ref 0–0.04)
IMM GRANULOCYTES NFR BLD AUTO: 0.3 % (ref 0–0.5)
LDH SERPL L TO P-CCNC: 180 U/L (ref 135–246)
LYMPHOCYTES # BLD: 0.88 K/UL (ref 0.8–3.5)
LYMPHOCYTES NFR BLD: 13 % (ref 12–49)
MCH RBC QN AUTO: 32.3 PG (ref 26–34)
MCHC RBC AUTO-ENTMCNC: 33.3 G/DL (ref 30–36.5)
MCV RBC AUTO: 96.8 FL (ref 80–99)
MONOCYTES # BLD: 0.74 K/UL (ref 0–1)
MONOCYTES NFR BLD: 10.9 % (ref 5–13)
NEUTS SEG # BLD: 4.8 K/UL (ref 1.8–8)
NEUTS SEG NFR BLD: 70.9 % (ref 32–75)
NRBC # BLD: 0 K/UL (ref 0–0.01)
NRBC BLD-RTO: 0 PER 100 WBC
PLATELET # BLD AUTO: 297 K/UL (ref 150–400)
PMV BLD AUTO: 9.4 FL (ref 8.9–12.9)
POTASSIUM SERPL-SCNC: 4.4 MMOL/L (ref 3.5–5.1)
PROT SERPL-MCNC: 6.7 G/DL (ref 6.4–8.3)
RBC # BLD AUTO: 4.31 M/UL (ref 3.8–5.2)
SODIUM SERPL-SCNC: 139 MMOL/L (ref 136–145)
WBC # BLD AUTO: 6.8 K/UL (ref 3.6–11)

## 2025-08-07 PROCEDURE — G2211 COMPLEX E/M VISIT ADD ON: HCPCS | Performed by: NURSE PRACTITIONER

## 2025-08-07 PROCEDURE — 99215 OFFICE O/P EST HI 40 MIN: CPT | Performed by: NURSE PRACTITIONER

## 2025-08-07 ASSESSMENT — PATIENT HEALTH QUESTIONNAIRE - PHQ9
SUM OF ALL RESPONSES TO PHQ QUESTIONS 1-9: 0
2. FEELING DOWN, DEPRESSED OR HOPELESS: NOT AT ALL
SUM OF ALL RESPONSES TO PHQ QUESTIONS 1-9: 0
1. LITTLE INTEREST OR PLEASURE IN DOING THINGS: NOT AT ALL

## (undated) LAB
ALBUMIN SERPL-MCNC: 4.8 G/DL (ref 3.8–4.9)
ALBUMIN/GLOB SERPL: 2.4 {RATIO} (ref 1.2–2.2)
ALP SERPL-CCNC: 104 IU/L (ref 44–121)
ALT SERPL-CCNC: 20 IU/L (ref 0–32)
AST SERPL-CCNC: 21 IU/L (ref 0–40)
BASOPHILS # BLD AUTO: 0.1 X10E3/UL (ref 0–0.2)
BASOPHILS NFR BLD AUTO: 2 %
BILIRUB SERPL-MCNC: 0.6 MG/DL (ref 0–1.2)
BUN SERPL-MCNC: 12 MG/DL (ref 6–24)
BUN/CREAT SERPL: 13 (ref 9–23)
CALCIUM SERPL-MCNC: 10 MG/DL (ref 8.7–10.2)
CHLORIDE SERPL-SCNC: 101 MMOL/L (ref 96–106)
CO2 SERPL-SCNC: 26 MMOL/L (ref 20–29)
CREAT SERPL-MCNC: 0.9 MG/DL (ref 0.57–1)
EGFRCR SERPLBLD CKD-EPI 2021: 77 ML/MIN/1.73
EOSINOPHIL # BLD AUTO: 0.1 X10E3/UL (ref 0–0.4)
EOSINOPHIL NFR BLD AUTO: 4 %
ERYTHROCYTE [DISTWIDTH] IN BLOOD BY AUTOMATED COUNT: 12.2 % (ref 11.7–15.4)
GLOBULIN SER CALC-MCNC: 2 G/DL (ref 1.5–4.5)
GLUCOSE SERPL-MCNC: 90 MG/DL (ref 70–99)
HCT VFR BLD AUTO: 37.6 % (ref 34–46.6)
HGB BLD-MCNC: 13.5 G/DL (ref 11.1–15.9)
IMM GRANULOCYTES # BLD AUTO: 0 X10E3/UL (ref 0–0.1)
IMM GRANULOCYTES NFR BLD AUTO: 0 %
LDH SERPL L TO P-CCNC: 159 IU/L (ref 119–226)
LYMPHOCYTES # BLD AUTO: 0.8 X10E3/UL (ref 0.7–3.1)
LYMPHOCYTES NFR BLD AUTO: 21 %
MCH RBC QN AUTO: 35.3 PG (ref 26.6–33)
MCHC RBC AUTO-ENTMCNC: 35.9 G/DL (ref 31.5–35.7)
MCV RBC AUTO: 98 FL (ref 79–97)
MONOCYTES # BLD AUTO: 0.5 X10E3/UL (ref 0.1–0.9)
MONOCYTES NFR BLD AUTO: 13 %
NEUTROPHILS # BLD AUTO: 2.1 X10E3/UL (ref 1.4–7)
NEUTROPHILS NFR BLD AUTO: 60 %
PLATELET # BLD AUTO: 282 X10E3/UL (ref 150–450)
POTASSIUM SERPL-SCNC: 4.5 MMOL/L (ref 3.5–5.2)
PROT SERPL-MCNC: 6.8 G/DL (ref 6–8.5)
RBC # BLD AUTO: 3.82 X10E6/UL (ref 3.77–5.28)
SODIUM SERPL-SCNC: 141 MMOL/L (ref 134–144)
WBC # BLD AUTO: 3.6 X10E3/UL (ref 3.4–10.8)